# Patient Record
Sex: FEMALE | Race: WHITE | NOT HISPANIC OR LATINO | Employment: OTHER | URBAN - METROPOLITAN AREA
[De-identification: names, ages, dates, MRNs, and addresses within clinical notes are randomized per-mention and may not be internally consistent; named-entity substitution may affect disease eponyms.]

---

## 2017-05-04 ENCOUNTER — OFFICE VISIT (OUTPATIENT)
Dept: FAMILY MEDICINE | Facility: CLINIC | Age: 72
End: 2017-05-04
Payer: COMMERCIAL

## 2017-05-04 VITALS
OXYGEN SATURATION: 98 % | WEIGHT: 161 LBS | HEIGHT: 70 IN | DIASTOLIC BLOOD PRESSURE: 71 MMHG | HEART RATE: 86 BPM | TEMPERATURE: 96.6 F | BODY MASS INDEX: 23.05 KG/M2 | SYSTOLIC BLOOD PRESSURE: 132 MMHG

## 2017-05-04 DIAGNOSIS — E78.5 HYPERLIPIDEMIA LDL GOAL <100: ICD-10-CM

## 2017-05-04 DIAGNOSIS — R07.9 CHEST PAIN, UNSPECIFIED TYPE: ICD-10-CM

## 2017-05-04 DIAGNOSIS — K14.8 TONGUE LESION: ICD-10-CM

## 2017-05-04 DIAGNOSIS — I65.22 STENOSIS OF LEFT CAROTID ARTERY: ICD-10-CM

## 2017-05-04 DIAGNOSIS — F17.218 CIGARETTE NICOTINE DEPENDENCE WITH OTHER NICOTINE-INDUCED DISORDER: ICD-10-CM

## 2017-05-04 DIAGNOSIS — I73.9 PVD (PERIPHERAL VASCULAR DISEASE) (H): Primary | ICD-10-CM

## 2017-05-04 LAB
ERYTHROCYTE [DISTWIDTH] IN BLOOD BY AUTOMATED COUNT: 14.2 % (ref 10–15)
ERYTHROCYTE [SEDIMENTATION RATE] IN BLOOD BY WESTERGREN METHOD: 14 MM/H (ref 0–30)
HCT VFR BLD AUTO: 43 % (ref 35–47)
HGB BLD-MCNC: 14.2 G/DL (ref 11.7–15.7)
MCH RBC QN AUTO: 31.6 PG (ref 26.5–33)
MCHC RBC AUTO-ENTMCNC: 33 G/DL (ref 31.5–36.5)
MCV RBC AUTO: 96 FL (ref 78–100)
PLATELET # BLD AUTO: 278 10E9/L (ref 150–450)
RBC # BLD AUTO: 4.5 10E12/L (ref 3.8–5.2)
WBC # BLD AUTO: 9.6 10E9/L (ref 4–11)

## 2017-05-04 PROCEDURE — 85652 RBC SED RATE AUTOMATED: CPT | Performed by: INTERNAL MEDICINE

## 2017-05-04 PROCEDURE — 99203 OFFICE O/P NEW LOW 30 MIN: CPT | Performed by: INTERNAL MEDICINE

## 2017-05-04 PROCEDURE — 85027 COMPLETE CBC AUTOMATED: CPT | Performed by: INTERNAL MEDICINE

## 2017-05-04 PROCEDURE — 36415 COLL VENOUS BLD VENIPUNCTURE: CPT | Performed by: INTERNAL MEDICINE

## 2017-05-04 NOTE — NURSING NOTE
"Chief Complaint   Patient presents with     New Patient     tongue lesion --has been seen [facility] x2  aches left gland up to ear        Initial /71 (BP Location: Left arm, Patient Position: Chair, Cuff Size: Adult Regular)  Pulse 86  Temp 96.6  F (35.9  C) (Oral)  Ht 5' 10\" (1.778 m)  Wt 161 lb (73 kg)  SpO2 98%  Breastfeeding? No  BMI 23.1 kg/m2 Estimated body mass index is 23.1 kg/(m^2) as calculated from the following:    Height as of this encounter: 5' 10\" (1.778 m).    Weight as of this encounter: 161 lb (73 kg).  Medication Reconciliation: complete  "

## 2017-05-04 NOTE — MR AVS SNAPSHOT
After Visit Summary   5/4/2017    Ramya Hillman    MRN: [id number]           Patient Information     Date Of Birth          1945        Visit Information        Provider Department      5/4/2017 7:30 AM Madan Lizama MD [facility]        Today's Diagnoses     PVD (peripheral vascular disease) (H)    -  1    Hyperlipidemia LDL goal <100        Chest pain, unspecified type        Tongue lesion        Stenosis of left carotid artery        Cigarette nicotine dependence with other nicotine-induced disorder           Follow-ups after your visit        Your next 10 appointments already scheduled     May 05, 2017 10:00 AM CDT   US CAROTID BILATERAL with SHUS1   [facility] ([facility])    [address] [address] [phone]           Please bring a list of your medicines (including vitamins, minerals and over-the-counter drugs). Also, tell your doctor about any allergies you may have. Wear comfortable clothes and leave your valuables at home.  You do not need to do anything special to prepare for your exam.  Please call the Imaging Department at your exam site with any questions.              Future tests that were ordered for you today     Open Future Orders        Priority Expected Expires Ordered    US Carotid Bilateral Routine  5/4/2018 5/4/2017            Who to contact     If you have questions or need follow up information about today's clinic visit or your schedule please contact [facility] directly at [phone].  Normal or non-critical lab and imaging results will be communicated to you by MyChart, letter or phone within 4 business days after the clinic has received the results. If you do not hear from us within 7 days, please contact the clinic through MyChart or phone. If you have a critical or abnormal lab result, we will notify you by phone as soon as possible.  Submit refill requests through [employer]  "or call your pharmacy and they will forward the refill request to us. Please allow 3 business days for your refill to be completed.          Additional Information About Your Visit        MyChart Information     [employer] lets you send messages to your doctor, view your test results, renew your prescriptions, schedule appointments and more. To sign up, go to [email or web address] . Click on \"Log in\" on the left side of the screen, which will take you to the Welcome page. Then click on \"Sign up Now\" on the right side of the page.     You will be asked to enter the access code listed below, as well as some personal information. Please follow the directions to create your username and password.     Your access code is: [email or web address]  Expires: 2017  8:02 AM     Your access code will  in 90 days. If you need help or a new code, please call your [place] clinic or [phone].        Care EveryWhere ID     This is your [facility] EveryWhere ID. This could be used by other organizations to access your [place] medical records  [id number]        Your Vitals Were     Pulse Temperature Height Pulse Oximetry Breastfeeding? BMI (Body Mass Index)    86 96.6  F (35.9  C) (Oral) 5' 10\" (1.778 m) 98% No 23.1 kg/m2       Blood Pressure from Last 3 Encounters:   17 132/71   11/15/16 110/72   11/10/15 118/72    Weight from Last 3 Encounters:   17 161 lb (73 kg)   11/15/16 169 lb (76.7 kg)   11/10/15 168 lb (76.2 kg)              We Performed the Following     CBC with platelets     ESR: Erythrocyte sedimentation rate        Primary Care Provider Office Phone # Fax #    Madan Lizama [phone]       [facility] [address]  [facility] [address]        Thank you!     Thank you for choosing [facility]  for your care. Our goal is always to provide you with excellent care. Hearing back from our patients is one way we can continue to improve our services. Please take a " few minutes to complete the written survey that you may receive in the mail after your visit with us. Thank you!             Your Updated Medication List - Protect others around you: Learn how to safely use, store and throw away your medicines at www.disposemymeds.org.          This list is accurate as of: 5/4/17  2:14 PM.  Always use your most recent med list.                   Brand Name Dispense Instructions for use    aspirin 81 MG tablet     1 tablet    Take by mouth daily       Co Q10 100 MG Caps      Take by mouth daily       Fish Oil 1000 MG Cpdr      Take by mouth daily       Multi-vitamin Tabs tablet      Take 1 tablet by mouth daily       nitroglycerin 0.4 MG sublingual tablet    NITROSTAT    25 tablet    Place 1 tablet (0.4 mg) under the tongue every 5 minutes as needed for chest pain If you are still having symptoms after 3 doses (15 minutes) call 911.       simvastatin 20 MG tablet    ZOCOR    90 tablet    Take 1 tablet (20 mg) by mouth At Bedtime       vitamin D 1000 UNITS capsule      Take 1 capsule by mouth daily

## 2017-05-04 NOTE — PROGRESS NOTES
SUBJECTIVE:                                                    Ramya Hillman is a 72 year old female who presents to clinic today for the following health issues:    New Patient/Transfer of Care  Chief Complaint   Patient presents with     New Patient     tongue lesion --has been seen at [facility] Laryng x2  aches left gland up to ear      Patient with history of hyperlipidemia and peripheral vascular disease presents to the clinic to check on her left tongue lesion and neck pain. She states that for the last 5-6 weeks, she has had throbbing and stinging pain on the left side of her tongue, and an achy pain from her neck to her ear. She reports that it started when she woke up one morning and the left side of her mouth to the roof of her mouth was swollen and extremely sore. She states that she flossed and the pain resolved. Patient then reports that a week later she started getting sharp pain on the left side of her tongue. She saw an ENT specialist and was prescribed Prednisone and medrol dose pack. She finished the course of her medication but the pain did not resolve. Today, she states the pain has greatly improved but she still has throbbing and aching on the left side of her tongue and on the left side of her neck, from her left lymph node to her left ear. She states she has no problem swallowing or eating but hasn't had an appetite and has lost some weight.    Problem list and histories reviewed & adjusted, as indicated.  Additional history: as documented    Current Outpatient Prescriptions   Medication Sig Dispense Refill     simvastatin (ZOCOR) 20 MG tablet Take 1 tablet (20 mg) by mouth At Bedtime 90 tablet 4     aspirin 81 MG tablet Take by mouth daily 1 tablet      Omega-3 Fatty Acids (FISH OIL) 1000 MG CPDR Take by mouth daily       Cholecalciferol (VITAMIN D) 1000 UNITS capsule Take 1 capsule by mouth daily       multivitamin, therapeutic with minerals (MULTI-VITAMIN) TABS Take 1 tablet by mouth  "daily       Coenzyme Q10 (CO Q10) 100 MG CAPS Take by mouth daily       nitroglycerin (NITROSTAT) 0.4 MG SL tablet Place 1 tablet (0.4 mg) under the tongue every 5 minutes as needed for chest pain If you are still having symptoms after 3 doses (15 minutes) call 911. 25 tablet 0     No Known Allergies    ROS:  Constitutional, HEENT, cardiovascular, pulmonary, gi and gu systems are negative, except as otherwise noted.    This document serves as a record of the services and decisions personally performed and made by Madan Lizama MD. It was created on his/her behalf by Enrike Ramirez, a trained medical scribe. The creation of this document is based the provider's statements to the medical scribe.  Scribe Enrike Ramirez 7:40 AM, May 4, 2017    OBJECTIVE:                                                    /71 (BP Location: Left arm, Patient Position: Chair, Cuff Size: Adult Regular)  Pulse 86  Temp 96.6  F (35.9  C) (Oral)  Ht 1.778 m (5' 10\")  Wt 73 kg (161 lb)  SpO2 98%  Breastfeeding? No  BMI 23.1 kg/m2  Body mass index is 23.1 kg/(m^2).    HEENT nose and throat clear, left lateral tongue sensitive but completely normal  Neck was supple without adenopathy or thyromegaly, she has a left carotid bruit  Chest clear to auscultation and percussion  Cardiovascular S1 and S2 are physiologic without murmurs or gallops  Abdomen bowel sounds were normal.  There is no palpable mass or organomegaly  Extremities nontender without any edema  Pulses pedal pulses are as described otherwise his pulses are bilaterally symmetrical throughout without bruits  Skin without significant abnormality    Diagnostic Test Results:  No results found for this or any previous visit (from the past 24 hour(s)).     ASSESSMENT/PLAN:                                                    1. PVD (peripheral vascular disease) (H)    2. Hyperlipidemia LDL goal <100    3. Chest pain, unspecified type    4. Tongue lesion  - CBC with " platelets  - ESR: Erythrocyte sedimentation rate    5. Stenosis of left carotid artery  - US Carotid Bilateral; Future    6. Cigarette nicotine dependence with other nicotine-induced disorder  -Her cigarette cessation is progressing relatively successfully. She has cut back to smoking 3 cigarettes daily.      -Take regularly, ibuprofen 600 MG three times daily with food for a week.     The information in this document, created by the medical scribe for me, accurately reflects the services I personally performed and the decisions made by me. I have reviewed and approved this document for accuracy prior to leaving the patient care area.  Madan Lizama MD  7:49 AM, 05/04/17    Madan Lizama MD  [facility]

## 2017-05-05 ENCOUNTER — HOSPITAL ENCOUNTER (OUTPATIENT)
Dept: ULTRASOUND IMAGING | Facility: CLINIC | Age: 72
Discharge: HOME | End: 2017-05-05
Attending: INTERNAL MEDICINE | Admitting: INTERNAL MEDICINE
Payer: MEDICARE

## 2017-05-05 DIAGNOSIS — I65.22 STENOSIS OF LEFT CAROTID ARTERY: ICD-10-CM

## 2017-05-05 PROCEDURE — 93880 EXTRACRANIAL BILAT STUDY: CPT

## 2017-05-09 ENCOUNTER — OFFICE VISIT (OUTPATIENT)
Dept: FAMILY MEDICINE | Facility: CLINIC | Age: 72
End: 2017-05-09
Payer: COMMERCIAL

## 2017-05-09 VITALS
BODY MASS INDEX: 23.34 KG/M2 | TEMPERATURE: 96.8 F | HEIGHT: 70 IN | DIASTOLIC BLOOD PRESSURE: 91 MMHG | OXYGEN SATURATION: 96 % | HEART RATE: 87 BPM | WEIGHT: 163 LBS | SYSTOLIC BLOOD PRESSURE: 162 MMHG

## 2017-05-09 DIAGNOSIS — K14.6 PAINFUL TONGUE: ICD-10-CM

## 2017-05-09 DIAGNOSIS — Z23 NEED FOR VACCINATION: Primary | ICD-10-CM

## 2017-05-09 DIAGNOSIS — F17.200 NEEDS SMOKING CESSATION EDUCATION: ICD-10-CM

## 2017-05-09 DIAGNOSIS — Z23 NEED FOR PROPHYLACTIC VACCINATION WITH TETANUS-DIPHTHERIA (TD): ICD-10-CM

## 2017-05-09 PROCEDURE — 99213 OFFICE O/P EST LOW 20 MIN: CPT | Performed by: INTERNAL MEDICINE

## 2017-05-09 NOTE — MR AVS SNAPSHOT
After Visit Summary   5/9/2017    Ramya Hillman    MRN: [id number]           Patient Information     Date Of Birth          1945        Visit Information        Provider Department      5/9/2017 2:30 PM Madan Lizama MD [facility]        Today's Diagnoses     Need for vaccination    -  1    Need for prophylactic vaccination with tetanus-diphtheria (TD)        Needs smoking cessation education        Painful tongue           Follow-ups after your visit        Future tests that were ordered for you today     Open Future Orders        Priority Expected Expires Ordered    TD (ADULT, 7+) PRESERVE FREE Routine  5/10/2018 5/10/2017    1st  Administration  [56102] Routine  5/10/2018 5/10/2017            Who to contact     If you have questions or need follow up information about today's clinic visit or your schedule please contact [facility] directly at [phone].  Normal or non-critical lab and imaging results will be communicated to you by Zw[employer], letter or phone within 4 business days after the clinic has received the results. If you do not hear from us within 7 days, please contact the clinic through Zw[employer] or phone. If you have a critical or abnormal lab result, we will notify you by phone as soon as possible.  Submit refill requests through [employer] or call your pharmacy and they will forward the refill request to us. Please allow 3 business days for your refill to be completed.          Additional Information About Your Visit        MyChart Information     [employer] gives you secure access to your electronic health record. If you see a primary care provider, you can also send messages to your care team and make appointments. If you have questions, please call your primary care clinic.  If you do not have a primary care provider, please call [phone] and they will assist you.        Care EveryWhere ID     This is your Care EveryWhere ID. This could be used by other  "organizations to access your [place] medical records  [id number]        Your Vitals Were     Pulse Temperature Height Pulse Oximetry Breastfeeding? BMI (Body Mass Index)    87 96.8  F (36  C) (Tympanic) 5' 10\" (1.778 m) 96% No 23.39 kg/m2       Blood Pressure from Last 3 Encounters:   05/09/17 (!) 162/91   05/04/17 132/71   11/15/16 110/72    Weight from Last 3 Encounters:   05/09/17 163 lb (73.9 kg)   05/04/17 161 lb (73 kg)   11/15/16 169 lb (76.7 kg)               Primary Care Provider Office Phone # Fax #    Madan Lizama [phone]       [facility] [address] [facility] [address]  [facility] [address]        Thank you!     Thank you for choosing [facility]  for your care. Our goal is always to provide you with excellent care. Hearing back from our patients is one way we can continue to improve our services. Please take a few minutes to complete the written survey that you may receive in the mail after your visit with us. Thank you!             Your Updated Medication List - Protect others around you: Learn how to safely use, store and throw away your medicines at www.disposemymeds.org.          This list is accurate as of: 5/9/17 11:59 PM.  Always use your most recent med list.                   Brand Name Dispense Instructions for use    aspirin 81 MG tablet     1 tablet    Take by mouth daily       Co Q10 100 MG Caps      Take by mouth daily       Fish Oil 1000 MG Cpdr      Take by mouth daily       Multi-vitamin Tabs tablet      Take 1 tablet by mouth daily       nitroglycerin 0.4 MG sublingual tablet    NITROSTAT    25 tablet    Place 1 tablet (0.4 mg) under the tongue every 5 minutes as needed for chest pain If you are still having symptoms after 3 doses (15 minutes) call 911.       simvastatin 20 MG tablet    ZOCOR    90 tablet    Take 1 tablet (20 mg) by mouth At Bedtime       vitamin D 1000 UNITS capsule      Take 1 capsule by mouth daily         "

## 2017-05-09 NOTE — NURSING NOTE
Left voicemail for Ramya Crews. She left before we could give her the Tetanus vaccine. Dr. Lizama would like her to stop in when she is able to receive it, but is recommending it is administered in her right arm. She may stop in any time for the Td.

## 2017-05-09 NOTE — PROGRESS NOTES
"  SUBJECTIVE:                                                    Ramya Hillman is a 72 year old female who presents to clinic today for the following health issues:    Chief Complaint   Patient presents with     Follow Up For     Tongue lesion, no improvement. Also thinks that there may be a new spot on the back of the left side of the tongue that is painful, blistering and raised.      Imm/Inj     due for Td vaccine.     Patient requests results of the carotid bilateral US.    Mrs. Hillman presents to the clinic for reevaluation of \"stinging, throbbing\" pain on the left side of her mouth. For initial presentation, evaluation and plan of care see my note from clinic visit on 05/04/2017. She states when she uses a 10x magnifying mirror to look at the posterior oropharynx, under the left side of the tongue, she can see an area of raise \"bubbly\" area. She reports taking ibuprofen TID which has helped.    Problem list and histories reviewed & adjusted, as indicated.  Additional history: as documented    Current Outpatient Prescriptions   Medication Sig Dispense Refill     simvastatin (ZOCOR) 20 MG tablet Take 1 tablet (20 mg) by mouth At Bedtime 90 tablet 4     aspirin 81 MG tablet Take by mouth daily 1 tablet      Omega-3 Fatty Acids (FISH OIL) 1000 MG CPDR Take by mouth daily       Cholecalciferol (VITAMIN D) 1000 UNITS capsule Take 1 capsule by mouth daily       multivitamin, therapeutic with minerals (MULTI-VITAMIN) TABS Take 1 tablet by mouth daily       Coenzyme Q10 (CO Q10) 100 MG CAPS Take by mouth daily       nitroglycerin (NITROSTAT) 0.4 MG SL tablet Place 1 tablet (0.4 mg) under the tongue every 5 minutes as needed for chest pain If you are still having symptoms after 3 doses (15 minutes) call 911. 25 tablet 0     No Known Allergies    Reviewed and updated as needed this visit by clinical staff       Reviewed and updated as needed this visit by Provider       ROS:  Constitutional, HEENT, cardiovascular, " "pulmonary, gi and gu systems are negative, except as otherwise noted.    This document serves as a record of the services and decisions personally performed and made by Madan Lizama MD. It was created on his/her behalf by Lucy Mercado, a trained medical scribe. The creation of this document is based the provider's statements to the medical scribe.  Scribe Lucy Mercado 2:39 PM, May 9, 2017     OBJECTIVE:                                                    BP (!) 162/91 (BP Location: Right arm, Cuff Size: Adult Regular)  Pulse 87  Temp 96.8  F (36  C) (Tympanic)  Ht 1.778 m (5' 10\")  Wt 73.9 kg (163 lb)  SpO2 96%  Breastfeeding? No  BMI 23.39 kg/m2  Body mass index is 23.39 kg/(m^2).  HEENT: Ears normal, tongue showed normal foliate papillae without any ulceration or other significant tissue changes of her posterior lateral tongue which was sensitive to the touch. Nasopharynx was normal.  Neck was supple without thyromegaly her carotids were normal without bruits. Previously enlarged cervical or submandibular lymph node was not palpable.    Carotid US was unremarkable.     ASSESSMENT/PLAN:                                                    1. Need for prophylactic vaccination with tetanus-diphtheria (TD)  Will contact her for a nurse only appointment in the near future.   - TD (ADULT, 7+) PRESERVE FREE    2. Needs smoking cessation education  3. Painful tongue  Patients left-sided tongue paresthesias are thought to be referred. The radiation into her left jaw and neck have receded and recommended continuing with the nonsteroidal anti-inflammatory drugs and evaluating the situation over the next two weeks. If there is no ongoing improvement and follow-up CT scanning will be the next step.  Follow up call in 2  weeks    Madan Lizama MD  [facility]    The information in this document, created by the medical scribe for me, accurately reflects the services I personally performed and the " decisions made by me. I have reviewed and approved this document for accuracy prior to leaving the patient care area.  Madan Lizama MD  2:50 PM, 05/09/17

## 2017-05-09 NOTE — NURSING NOTE
"Chief Complaint   Patient presents with     Follow Up For     Tongue lesion, no improvement. Also thinks that there may be a new spot on the back of the left side of the tongue that is painful, blistering and raised.      Imm/Inj     due for Td vaccine.       Initial BP (!) 162/91 (BP Location: Right arm, Cuff Size: Adult Regular)  Pulse 87  Temp 96.8  F (36  C) (Tympanic)  Ht 5' 10\" (1.778 m)  Wt 163 lb (73.9 kg)  SpO2 96%  Breastfeeding? No  BMI 23.39 kg/m2 Estimated body mass index is 23.39 kg/(m^2) as calculated from the following:    Height as of this encounter: 5' 10\" (1.778 m).    Weight as of this encounter: 163 lb (73.9 kg).  Medication Reconciliation: complete   Sloane Ling MA      "

## 2017-05-10 PROBLEM — K14.6 PAINFUL TONGUE: Status: ACTIVE | Noted: 2017-05-10

## 2017-05-15 ENCOUNTER — MYC MEDICAL ADVICE (OUTPATIENT)
Dept: FAMILY MEDICINE | Facility: CLINIC | Age: 72
End: 2017-05-15

## 2017-05-15 DIAGNOSIS — K14.6 TONGUE PAIN: Primary | ICD-10-CM

## 2017-05-15 NOTE — TELEPHONE ENCOUNTER
PCP: Please see mychart message below regarding ongoing left neck pain that goes between chin and left ear.   She has been taking ibuprofen with not much relief.   She is requesting CT scan this week if possible.   Please advise if CT scan is appropriate.     Lucila Livingston RN

## 2017-05-18 ENCOUNTER — HOSPITAL ENCOUNTER (OUTPATIENT)
Dept: MRI IMAGING | Facility: CLINIC | Age: 72
Discharge: HOME | End: 2017-05-18
Attending: INTERNAL MEDICINE | Admitting: INTERNAL MEDICINE
Payer: MEDICARE

## 2017-05-18 DIAGNOSIS — K14.6 TONGUE PAIN: ICD-10-CM

## 2017-05-18 LAB
CREAT BLD-MCNC: 0.8 MG/DL (ref 0.52–1.04)
GFR SERPL CREATININE-BSD FRML MDRD: 71 ML/MIN/1.7M2

## 2017-05-18 PROCEDURE — A9585 GADOBUTROL INJECTION: HCPCS | Performed by: INTERNAL MEDICINE

## 2017-05-18 PROCEDURE — 70553 MRI BRAIN STEM W/O & W/DYE: CPT

## 2017-05-18 PROCEDURE — 25000128 H RX IP 250 OP 636: Performed by: INTERNAL MEDICINE

## 2017-05-18 PROCEDURE — 82565 ASSAY OF CREATININE: CPT

## 2017-05-18 RX ORDER — GADOBUTROL 604.72 MG/ML
7.5 INJECTION INTRAVENOUS ONCE
Status: COMPLETED | OUTPATIENT
Start: 2017-05-18 | End: 2017-05-18

## 2017-05-18 RX ADMIN — GADOBUTROL 7.5 ML: 604.72 INJECTION INTRAVENOUS at 12:21

## 2017-07-01 ENCOUNTER — HEALTH MAINTENANCE LETTER (OUTPATIENT)
Age: 72
End: 2017-07-01

## 2017-08-18 ENCOUNTER — OFFICE VISIT (OUTPATIENT)
Dept: URGENT CARE | Facility: URGENT CARE | Age: 72
End: 2017-08-18
Payer: COMMERCIAL

## 2017-08-18 VITALS
BODY MASS INDEX: 24.25 KG/M2 | TEMPERATURE: 98.2 F | DIASTOLIC BLOOD PRESSURE: 86 MMHG | HEART RATE: 91 BPM | SYSTOLIC BLOOD PRESSURE: 140 MMHG | WEIGHT: 169 LBS | OXYGEN SATURATION: 99 %

## 2017-08-18 DIAGNOSIS — L98.9 SKIN LESION: Primary | ICD-10-CM

## 2017-08-18 PROCEDURE — 99213 OFFICE O/P EST LOW 20 MIN: CPT | Performed by: FAMILY MEDICINE

## 2017-08-18 NOTE — NURSING NOTE
"Chief Complaint   Patient presents with     Derm Problem     has red area on lt lower leg for past week.Also has a mole behind rt ear.       Initial /86 (BP Location: Left arm, Patient Position: Chair, Cuff Size: Adult Regular)  Pulse 91  Temp 98.2  F (36.8  C) (Oral)  Wt 169 lb (76.7 kg)  SpO2 99%  BMI 24.25 kg/m2 Estimated body mass index is 24.25 kg/(m^2) as calculated from the following:    Height as of 5/9/17: 5' 10\" (1.778 m).    Weight as of this encounter: 169 lb (76.7 kg).  Medication Reconciliation: complete   Lucia BHATTI    "

## 2017-08-18 NOTE — PROGRESS NOTES
SUBJECTIVE:Ramya Hillman is a 72 year old female who presents to the clinic today for a rash.  Onset of skin lesion was week(s) ago.   Rash is still present.   Location of the rash: ear and lower leg.  Associated symptoms include: redness.    Symptoms are mild and moderate and rash seems to be worsening.  Therapies tried to improve the rash: none.  Previous history of a similar rash? Yes: BCC and SK  Recent exposure history: none known    Past Medical History:   Diagnosis Date     Chest pain      Condyloma     had for5 years, 20 years ago,recurrence 2012 biopsy taken     Family history of heart disease      Hyperlipidemia      PVD (peripheral vascular disease) (H)      Tobacco abuse      No Known Allergies  Social History   Substance Use Topics     Smoking status: Current Some Day Smoker     Packs/day: 0.25     Types: Cigarettes     Last attempt to quit: 9/15/2016     Smokeless tobacco: Never Used      Comment: 2-3 cigarettes daily     Alcohol use No       ROS:CONSTITUTIONAL:NEGATIVE for fever, chills, change in weight    EXAM: VITALS: /86 (BP Location: Left arm, Patient Position: Chair, Cuff Size: Adult Regular)  Pulse 91  Temp 98.2  F (36.8  C) (Oral)  Wt 169 lb (76.7 kg)  SpO2 99%  BMI 24.25 kg/m2  General:healthy,alert,no distress    Location: behing rt ear and leftleg     Distribution: localized     Lesion grouping: single     Lesion type: macular and papular     Color: red with no other findingsPERTINENT EXAM: GENERAL APPEARANCE: healthy, alert and no distress      ICD-10-CM    1. Skin lesion L98.9 DERMATOLOGY REFERRAL     Follow-up with primary clinic if not improving

## 2017-08-18 NOTE — MR AVS SNAPSHOT
After Visit Summary   8/18/2017    Ramya Hillman    MRN: [id number]           Patient Information     Date Of Birth          1945        Visit Information        Provider Department      8/18/2017 4:20 PM Raudel Godoy,  [facility]        Today's Diagnoses     Skin lesion    -  1       Follow-ups after your visit        Additional Services     DERMATOLOGY REFERRAL       Your provider has referred you to: FM[facility]: [facility] [phone]   [email or web address]  [facility]: [facility] [phone]   [email or web address]    Please be aware that coverage of these services is subject to the terms and limitations of your health insurance plan.  Call member services at your health plan with any benefit or coverage questions.      Please bring the following with you to your appointment:    (1) Any X-Rays, CTs or MRIs which have been performed.  Contact the facility where they were done to arrange for  prior to your scheduled appointment.    (2) List of current medications  (3) This referral request   (4) Any documents/labs given to you for this referral                  Who to contact     If you have questions or need follow up information about today's clinic visit or your schedule please contact [facility] directly at [phone].  Normal or non-critical lab and imaging results will be communicated to you by MyChart, letter or phone within 4 business days after the clinic has received the results. If you do not hear from us within 7 days, please contact the clinic through MyChart or phone. If you have a critical or abnormal lab result, we will notify you by phone as soon as possible.  Submit refill requests through [email or web address] or call your pharmacy and they will forward the refill request to us. Please allow 3  business days for your refill to be completed.          Additional Information About Your Visit        MyChart Information     [employer] gives you secure access to your electronic health record. If you see a primary care provider, you can also send messages to your care team and make appointments. If you have questions, please call your primary care clinic.  If you do not have a primary care provider, please call [phone] and they will assist you.        Care EveryWhere ID     This is your Care EveryWhere ID. This could be used by other organizations to access your [place] medical records  [id number]        Your Vitals Were     Pulse Temperature Pulse Oximetry BMI (Body Mass Index)          91 98.2  F (36.8  C) (Oral) 99% 24.25 kg/m2         Blood Pressure from Last 3 Encounters:   08/18/17 140/86   05/09/17 (!) 162/91   05/04/17 132/71    Weight from Last 3 Encounters:   08/18/17 169 lb (76.7 kg)   05/09/17 163 lb (73.9 kg)   05/04/17 161 lb (73 kg)              We Performed the Following     DERMATOLOGY REFERRAL        Primary Care Provider Office Phone # Fax #    Madan Lizama [phone] [address] [facility] [address]  [facility] [address]        Equal Access to Services     ASAD [place] : Hadii aad ku hadasho Soomaali, waaxda luqadaha, qaybta kaalmada adeegyada, waxay devonte sosa . So [facility] [phone].    ATENCIÓN: Si habla español, tiene a curran disposición servicios gratuitos de asistencia lingüística. Llame al [phone].    We comply with applicable federal civil rights laws and [place] laws. We do not discriminate on the basis of race, color, national origin, age, disability sex, sexual orientation or gender identity.            Thank you!     Thank you for choosing [facility]  for your care. Our goal is always to provide you with excellent care. Hearing back from our patients is one way we can continue to improve our services.  Please take a few minutes to complete the written survey that you may receive in the mail after your visit with us. Thank you!             Your Updated Medication List - Protect others around you: Learn how to safely use, store and throw away your medicines at www.disposemymeds.org.          This list is accurate as of: 8/18/17  4:40 PM.  Always use your most recent med list.                   Brand Name Dispense Instructions for use Diagnosis    aspirin 81 MG tablet     1 tablet    Take by mouth daily        Co Q10 100 MG Caps      Take by mouth daily        Fish Oil 1000 MG Cpdr      Take by mouth daily        Multi-vitamin Tabs tablet      Take 1 tablet by mouth daily        nitroGLYcerin 0.4 MG sublingual tablet    NITROSTAT    25 tablet    Place 1 tablet (0.4 mg) under the tongue every 5 minutes as needed for chest pain If you are still having symptoms after 3 doses (15 minutes) call 911.    Coronary artery disease involving autologous artery coronary bypass graft with unspecified angina pectoris       simvastatin 20 MG tablet    ZOCOR    90 tablet    Take 1 tablet (20 mg) by mouth At Bedtime    Hypercholesterolemia       vitamin D 1000 UNITS capsule      Take 1 capsule by mouth daily

## 2017-11-02 ENCOUNTER — OFFICE VISIT (OUTPATIENT)
Dept: FAMILY MEDICINE | Facility: CLINIC | Age: 72
End: 2017-11-02
Payer: COMMERCIAL

## 2017-11-02 VITALS
OXYGEN SATURATION: 100 % | WEIGHT: 168.2 LBS | TEMPERATURE: 97.1 F | HEIGHT: 70 IN | BODY MASS INDEX: 24.08 KG/M2 | DIASTOLIC BLOOD PRESSURE: 79 MMHG | SYSTOLIC BLOOD PRESSURE: 148 MMHG | HEART RATE: 76 BPM

## 2017-11-02 DIAGNOSIS — Z12.31 ENCOUNTER FOR SCREENING MAMMOGRAM FOR BREAST CANCER: ICD-10-CM

## 2017-11-02 DIAGNOSIS — K14.6 PAINFUL TONGUE: ICD-10-CM

## 2017-11-02 DIAGNOSIS — R53.83 FATIGUE, UNSPECIFIED TYPE: ICD-10-CM

## 2017-11-02 DIAGNOSIS — E55.9 VITAMIN D DEFICIENCY: ICD-10-CM

## 2017-11-02 DIAGNOSIS — I25.9 CHRONIC ISCHEMIC HEART DISEASE: ICD-10-CM

## 2017-11-02 DIAGNOSIS — Z23 NEED FOR PROPHYLACTIC VACCINATION AND INOCULATION AGAINST INFLUENZA: ICD-10-CM

## 2017-11-02 DIAGNOSIS — E78.5 HYPERLIPIDEMIA LDL GOAL <100: ICD-10-CM

## 2017-11-02 DIAGNOSIS — Z00.00 ROUTINE GENERAL MEDICAL EXAMINATION AT A HEALTH CARE FACILITY: ICD-10-CM

## 2017-11-02 DIAGNOSIS — I73.9 PVD (PERIPHERAL VASCULAR DISEASE) (H): Primary | ICD-10-CM

## 2017-11-02 PROBLEM — I25.750 CORONARY ARTERY DISEASE INVOLVING NATIVE ARTERY OF TRANSPLANTED HEART WITH UNSTABLE ANGINA PECTORIS (H): Status: RESOLVED | Noted: 2017-11-02 | Resolved: 2017-11-02

## 2017-11-02 PROBLEM — I25.750 CORONARY ARTERY DISEASE INVOLVING NATIVE ARTERY OF TRANSPLANTED HEART WITH UNSTABLE ANGINA PECTORIS (H): Status: ACTIVE | Noted: 2017-11-02

## 2017-11-02 LAB
ALBUMIN UR-MCNC: NEGATIVE MG/DL
APPEARANCE UR: CLEAR
BILIRUB UR QL STRIP: NEGATIVE
COLOR UR AUTO: YELLOW
ERYTHROCYTE [DISTWIDTH] IN BLOOD BY AUTOMATED COUNT: 14.6 % (ref 10–15)
GLUCOSE UR STRIP-MCNC: NEGATIVE MG/DL
HCT VFR BLD AUTO: 43 % (ref 35–47)
HGB BLD-MCNC: 14.2 G/DL (ref 11.7–15.7)
HGB UR QL STRIP: NEGATIVE
KETONES UR STRIP-MCNC: ABNORMAL MG/DL
LEUKOCYTE ESTERASE UR QL STRIP: NEGATIVE
MCH RBC QN AUTO: 31.5 PG (ref 26.5–33)
MCHC RBC AUTO-ENTMCNC: 33 G/DL (ref 31.5–36.5)
MCV RBC AUTO: 95 FL (ref 78–100)
NITRATE UR QL: NEGATIVE
PH UR STRIP: 5.5 PH (ref 5–7)
PLATELET # BLD AUTO: 267 10E9/L (ref 150–450)
RBC # BLD AUTO: 4.51 10E12/L (ref 3.8–5.2)
SOURCE: ABNORMAL
SP GR UR STRIP: 1.02 (ref 1–1.03)
UROBILINOGEN UR STRIP-ACNC: 0.2 EU/DL (ref 0.2–1)
WBC # BLD AUTO: 8.5 10E9/L (ref 4–11)

## 2017-11-02 PROCEDURE — 90686 IIV4 VACC NO PRSV 0.5 ML IM: CPT | Performed by: INTERNAL MEDICINE

## 2017-11-02 PROCEDURE — 84443 ASSAY THYROID STIM HORMONE: CPT | Performed by: INTERNAL MEDICINE

## 2017-11-02 PROCEDURE — 36415 COLL VENOUS BLD VENIPUNCTURE: CPT | Performed by: INTERNAL MEDICINE

## 2017-11-02 PROCEDURE — G0439 PPPS, SUBSEQ VISIT: HCPCS | Mod: 25 | Performed by: INTERNAL MEDICINE

## 2017-11-02 PROCEDURE — 82306 VITAMIN D 25 HYDROXY: CPT | Performed by: INTERNAL MEDICINE

## 2017-11-02 PROCEDURE — 90715 TDAP VACCINE 7 YRS/> IM: CPT | Performed by: INTERNAL MEDICINE

## 2017-11-02 PROCEDURE — 80061 LIPID PANEL: CPT | Performed by: INTERNAL MEDICINE

## 2017-11-02 PROCEDURE — 90471 IMMUNIZATION ADMIN: CPT | Performed by: INTERNAL MEDICINE

## 2017-11-02 PROCEDURE — 80053 COMPREHEN METABOLIC PANEL: CPT | Performed by: INTERNAL MEDICINE

## 2017-11-02 PROCEDURE — 81003 URINALYSIS AUTO W/O SCOPE: CPT | Performed by: INTERNAL MEDICINE

## 2017-11-02 PROCEDURE — G0008 ADMIN INFLUENZA VIRUS VAC: HCPCS | Mod: 59 | Performed by: INTERNAL MEDICINE

## 2017-11-02 PROCEDURE — 85027 COMPLETE CBC AUTOMATED: CPT | Performed by: INTERNAL MEDICINE

## 2017-11-02 NOTE — MR AVS SNAPSHOT
After Visit Summary   11/2/2017    Ramya Hillman    MRN: [id number]           Patient Information     Date Of Birth          1945        Visit Information        Provider Department      11/2/2017 3:00 PM Madan Lizama MD [facility]        Today's Diagnoses     PVD (peripheral vascular disease) (H)    -  1    Hyperlipidemia LDL goal <100        Chronic ischemic heart disease        Painful tongue        Fatigue, unspecified type        Vitamin D deficiency        Routine general medical examination at a health care facility        Encounter for screening mammogram for breast cancer        Need for prophylactic vaccination and inoculation against influenza           Follow-ups after your visit        Your next 10 appointments already scheduled     Nov 21, 2017  9:50 AM CST   PHYSICAL with Joanna Montalvo MD   [facility]    [address] [place] [address] [phone]              Who to contact     If you have questions or need follow up information about today's clinic visit or your schedule please contact [facility] directly at [phone].  Normal or non-critical lab and imaging results will be communicated to you by [employer], letter or phone within 4 business days after the clinic has received the results. If you do not hear from us within 7 days, please contact the clinic through Heckylhart or phone. If you have a critical or abnormal lab result, we will notify you by phone as soon as possible.  Submit refill requests through for; to (do) Centers or call your pharmacy and they will forward the refill request to us. Please allow 3 business days for your refill to be completed.          Additional Information About Your Visit        Heckylhart Information     for; to (do) Centers gives you secure access to your electronic health record. If you see a primary care provider, you can also send messages to your care team  "and make appointments. If you have questions, please call your primary care clinic.  If you do not have a primary care provider, please call [phone] and they will assist you.        Care EveryWhere ID     This is your Care EveryWhere ID. This could be used by other organizations to access your [place] medical records  [id number]        Your Vitals Were     Pulse Temperature Height Pulse Oximetry Breastfeeding? BMI (Body Mass Index)    76 97.1  F (36.2  C) (Oral) 5' 10\" (1.778 m) 100% No 24.13 kg/m2       Blood Pressure from Last 3 Encounters:   11/02/17 148/79   08/18/17 140/86   05/09/17 (!) 162/91    Weight from Last 3 Encounters:   11/02/17 168 lb 3.2 oz (76.3 kg)   08/18/17 169 lb (76.7 kg)   05/09/17 163 lb (73.9 kg)              We Performed the Following     CBC with platelets     Comprehensive metabolic panel     FLU VAC, SPLIT VIRUS IM > 3 YO (QUADRIVALENT) [70362]     Lipid panel reflex to direct LDL Fasting     TDAP VACCINE (ADACEL)     TSH with free T4 reflex     UA reflex to Microscopic and Culture     Vaccine Administration, Initial [04088]     Vitamin D Deficiency        Primary Care Provider Office Phone # Fax #    Madan Lizama [phone] [address] [address]        Equal Access to Services     [facility] : Hadii aad ku hadasho Soomaali, waaxda luqadaha, qaybta kaalmada ademoyyada, ayla sosa . So [facility] [phone].    ATENCIÓN: Si habla español, tiene a curran disposición servicios gratuitos de asistencia lingüística. Llame al [phone].    We comply with applicable federal civil rights laws and [place] laws. We do not discriminate on the basis of race, color, national origin, age, disability, sex, sexual orientation, or gender identity.            Thank you!     Thank you for choosing [facility]  for your care. Our goal is always to provide you with excellent care. Hearing back from our patients " is one way we can continue to improve our services. Please take a few minutes to complete the written survey that you may receive in the mail after your visit with us. Thank you!             Your Updated Medication List - Protect others around you: Learn how to safely use, store and throw away your medicines at www.disposemymeds.org.          This list is accurate as of: 11/2/17 11:59 PM.  Always use your most recent med list.                   Brand Name Dispense Instructions for use Diagnosis    aspirin 81 MG tablet     1 tablet    Take by mouth daily        Co Q10 100 MG Caps      Take by mouth daily        Fish Oil 1000 MG Cpdr      Take by mouth daily        Multi-vitamin Tabs tablet      Take 1 tablet by mouth daily        nitroGLYcerin 0.4 MG sublingual tablet    NITROSTAT    25 tablet    Place 1 tablet (0.4 mg) under the tongue every 5 minutes as needed for chest pain If you are still having symptoms after 3 doses (15 minutes) call 911.    Coronary artery disease involving autologous artery coronary bypass graft with unspecified angina pectoris       simvastatin 20 MG tablet    ZOCOR    90 tablet    Take 1 tablet (20 mg) by mouth At Bedtime    Hypercholesterolemia       vitamin D 1000 UNITS capsule      Take 1 capsule by mouth daily

## 2017-11-02 NOTE — NURSING NOTE
Screening Questionnaire for Adult Immunization    Are you sick today?   No   Do you have allergies to medications, food, a vaccine component or latex?   No   Have you ever had a serious reaction after receiving a vaccination?   No   Do you have a long-term health problem with heart disease, lung disease, asthma, kidney disease, metabolic disease (e.g. diabetes), anemia, or other blood disorder?   No   Do you have cancer, leukemia, HIV/AIDS, or any other immune system problem?   No   In the past 3 months, have you taken medications that affect  your immune system, such as prednisone, other steroids, or anticancer drugs; drugs for the treatment of rheumatoid arthritis, Crohn s disease, or psoriasis; or have you had radiation treatments?   No   Have you had a seizure, or a brain or other nervous system problem?   No   During the past year, have you received a transfusion of blood or blood     products, or been given immune (gamma) globulin or antiviral drug?   No   For women: Are you pregnant or is there a chance you could become        pregnant during the next month?   No   Have you received any vaccinations in the past 4 weeks?   No     Immunization questionnaire answers were all negative.        Per orders of Dr. Lizama, injection of Tdap given by Jose E Luong. Patient instructed to remain in clinic for 15 minutes afterwards, and to report any adverse reaction to me immediately.       Screening performed by Jose E Luong on 11/2/2017 at 4:06 PM.

## 2017-11-02 NOTE — NURSING NOTE
"Chief Complaint   Patient presents with     Follow Up For     Tongue lesion continue with sx, mild pain     LAB REQUEST     cholesterol check, fasting       Initial /79 (BP Location: Right arm, Cuff Size: Adult Regular)  Pulse 76  Temp 97.1  F (36.2  C) (Oral)  Ht 5' 10\" (1.778 m)  Wt 168 lb 3.2 oz (76.3 kg)  SpO2 100%  Breastfeeding? No  BMI 24.13 kg/m2 Estimated body mass index is 24.13 kg/(m^2) as calculated from the following:    Height as of this encounter: 5' 10\" (1.778 m).    Weight as of this encounter: 168 lb 3.2 oz (76.3 kg).  Medication Reconciliation: complete       DICKSON Ray      "

## 2017-11-02 NOTE — PROGRESS NOTES
SUBJECTIVE:   CC: Ramya Hillman is an 72 year old woman who presents for preventive health visit.     HPI    Patient with history of tongue pain presents to the clinic for a preventive health visit. She states that the previous pain that she experienced on her tongue from last year persists. She describes the pain as a mild throbbing ache that is constant, never resolves, and radiates back towards the back of her tongue and her neck. She states it feels as if something is stuck on her tongue.   Patient also complains of occasional heartburn approximately once a week. She denies any wet belches, acid taste in her mouth, and dysphagia.  She also denies headaches, sinus issues, allergies, vision changes, neck or back issues, dyspnea, angina, palpitations, bowel changes, hematochezia, urinary difficulties, vaginal atrophy, and other musculoskeletal issues. She sees a dermatologist for any skin changes. Of note, she stopped smoking cigarettes for approximately three weeks in July.    Today's PHQ-2 Score:   PHQ-2 ( 1999 Pfizer) 11/2/2017   Q1: Little interest or pleasure in doing things 0   Q2: Feeling down, depressed or hopeless 0   PHQ-2 Score 0     Abuse: Current or Past(Physical, Sexual or Emotional)- No  Do you feel safe in your environment - Yes    Social History   Substance Use Topics     Smoking status: Current Some Day Smoker     Packs/day: 0.25     Types: Cigarettes     Last attempt to quit: 9/15/2016     Smokeless tobacco: Never Used      Comment: 2-3 cigarettes daily     Alcohol use No     The patient does not drink >3 drinks per day nor >7 drinks per week.    Reviewed orders with patient.  Reviewed health maintenance and updated orders accordingly - Yes  Current Outpatient Prescriptions   Medication Sig Dispense Refill     simvastatin (ZOCOR) 20 MG tablet Take 1 tablet (20 mg) by mouth At Bedtime 90 tablet 4     aspirin 81 MG tablet Take by mouth daily 1 tablet      Omega-3 Fatty Acids (FISH OIL) 1000 MG  CPDR Take by mouth daily       Cholecalciferol (VITAMIN D) 1000 UNITS capsule Take 1 capsule by mouth daily       multivitamin, therapeutic with minerals (MULTI-VITAMIN) TABS Take 1 tablet by mouth daily       Coenzyme Q10 (CO Q10) 100 MG CAPS Take by mouth daily       nitroglycerin (NITROSTAT) 0.4 MG SL tablet Place 1 tablet (0.4 mg) under the tongue every 5 minutes as needed for chest pain If you are still having symptoms after 3 doses (15 minutes) call 911. 25 tablet 0     Allergies   Allergen Reactions     No Known Allergies        Patient over age 50, mutual decision to screen reflected in health maintenance.    Pertinent mammograms are reviewed under the imaging tab.  History of abnormal Pap smear: NO - age 65 - see link Cervical Cytology Screening Guidelines    Reviewed and updated as needed this visit by clinical staffTobacco  Allergies  Meds  Soc Hx        Reviewed and updated as needed this visit by Provider        Review of Systems  C: NEGATIVE for fever, chills, change in weight  I: NEGATIVE for worrisome rashes, moles or lesions  E: NEGATIVE for vision changes or irritation  ENT: NEGATIVE for ear, mouth and throat problems  R: NEGATIVE for significant cough or SOB  B: NEGATIVE for masses, tenderness or discharge  CV: NEGATIVE for chest pain, palpitations or peripheral edema  GI: NEGATIVE for nausea, abdominal pain, heartburn, or change in bowel habits  : NEGATIVE for unusual urinary or vaginal symptoms. No vaginal bleeding.  M: NEGATIVE for significant arthralgias or myalgia  N: NEGATIVE for weakness, dizziness or paresthesias  P: NEGATIVE for changes in mood or affect      This document serves as a record of the services and decisions personally performed and made by Madan Lizama MD. It was created on his/her behalf by Enrike Ramirez, a trained medical scribe. The creation of this document is based the provider's statements to the medical scribe.  Ranulfo Ramirez 3:18 PM, November 2,  "2017    OBJECTIVE:   /79 (BP Location: Right arm, Cuff Size: Adult Regular)  Pulse 76  Temp 97.1  F (36.2  C) (Oral)  Ht 1.778 m (5' 10\")  Wt 76.3 kg (168 lb 3.2 oz)  SpO2 100%  Breastfeeding? No  BMI 24.13 kg/m2  Physical Exam  GENERAL APPEARANCE: healthy, alert and no distress  EYES: Eyes grossly normal to inspection, PERRL and conjunctivae and sclerae normal  HENT: ear canals and TM's normal, nose and mouth without ulcers or lesions, oropharynx clear and oral mucous membranes moist  NECK: no adenopathy, no asymmetry, masses, or scars and thyroid normal to palpation  RESP: lungs clear to auscultation - no rales, rhonchi or wheezes  BREAST: normal without masses, tenderness or nipple discharge and no palpable axillary masses or adenopathy  CV: regular rate and rhythm, normal S1 S2, no S3 or S4, no murmur, click or rub, no peripheral edema and peripheral pulses strong  ABDOMEN: soft, nontender, no hepatosplenomegaly, no masses and bowel sounds normal  MS: no musculoskeletal defects are noted and gait is age appropriate without ataxia  SKIN: no suspicious lesions or rashes  NEURO: Normal strength and tone, sensory exam grossly normal, mentation intact and speech normal  PSYCH: mentation appears normal and affect normal/bright    ASSESSMENT/PLAN:   1. PVD (peripheral vascular disease) (H)  - TDAP VACCINE (ADACEL)    2. Hyperlipidemia LDL goal <100  - Comprehensive metabolic panel  - Lipid panel reflex to direct LDL Fasting    3. Chronic ischemic heart disease    4. Painful tongue  - MR Brain w/o & w Contrast; Future    5. Fatigue, unspecified type  - CBC with platelets  - TSH with free T4 reflex    6. Vitamin D deficiency  - Vitamin D Deficiency    7. Routine general medical examination at a health care facility  - UA reflex to Microscopic and Culture    8. Encounter for screening mammogram for breast cancer  - *MA Screening Digital Bilateral; Future    9. Need for prophylactic vaccination and inoculation " "against influenza  - FLU VAC, SPLIT VIRUS IM > 3 YO (QUADRIVALENT) [07850]  - Vaccine Administration, Initial [87983]    COUNSELING:  Reviewed preventive health counseling, as reflected in patient instructions       Regular exercise       Healthy diet/nutrition       Vision screening       Hearing screening     reports that she has been smoking Cigarettes.  She has been smoking about 0.25 packs per day. She has never used smokeless tobacco.  Tobacco Cessation Action Plan: Information offered: Patient not interested at this time  Estimated body mass index is 24.13 kg/(m^2) as calculated from the following:    Height as of this encounter: 1.778 m (5' 10\").    Weight as of this encounter: 76.3 kg (168 lb 3.2 oz).     Counseling Resources:  ATP IV Guidelines  Pooled Cohorts Equation Calculator  Breast Cancer Risk Calculator  FRAX Risk Assessment  ICSI Preventive Guidelines  Dietary Guidelines for Americans, 2010  [employer]'s MyPlate  ASA Prophylaxis  Lung CA Screening    Madan Lizama MD  [facility]    The information in this document, created by the medical scribe for me, accurately reflects the services I personally performed and the decisions made by me. I have reviewed and approved this document for accuracy prior to leaving the patient care area.  Madan Lizama MD  4:23 PM, 11/02/17    Injectable Influenza Immunization Documentation    1.  Is the person to be vaccinated sick today?   No    2. Does the person to be vaccinated have an allergy to a component   of the vaccine?   No  Egg Allergy Algorithm Link    3. Has the person to be vaccinated ever had a serious reaction   to influenza vaccine in the past?   No    4. Has the person to be vaccinated ever had Guillain-Barré syndrome?   No    Form completed by Patient        Patient refused HD flu shot ok per Dr Lizama to give regular dose    DICKSON Ray  "

## 2017-11-02 NOTE — PROGRESS NOTES
"  SUBJECTIVE:   Ramya Hillman is a 72 year old female who presents to clinic today for the following health issues:      Chief Complaint   Patient presents with     Follow Up For     Tongue lesion continue with sx, mild pain     LAB REQUEST     cholesterol check, fasting         {additional problems for provider to add:876076}    Problem list and histories reviewed & adjusted, as indicated.  Additional history: {NONE - AS DOCUMENTED:145564::\"as documented\"}    {HIST REVIEW/ LINKS 2:078325}    Reviewed and updated as needed this visit by clinical staff       Reviewed and updated as needed this visit by Provider         {PROVIDER CHARTING PREFERENCE:727658}    Injectable Influenza Immunization Documentation    1.  Is the person to be vaccinated sick today?   No    2. Does the person to be vaccinated have an allergy to a component   of the vaccine?   No  Egg Allergy Algorithm Link    3. Has the person to be vaccinated ever had a serious reaction   to influenza vaccine in the past?   No    4. Has the person to be vaccinated ever had Guillain-Barré syndrome?   No    Form completed by Patient        Patient refused HD flu shot ok per Dr Lizama to give regular dose    DICKSON Ray    "

## 2017-11-03 LAB
ALBUMIN SERPL-MCNC: 4.1 G/DL (ref 3.4–5)
ALP SERPL-CCNC: 68 U/L (ref 40–150)
ALT SERPL W P-5'-P-CCNC: 23 U/L (ref 0–50)
ANION GAP SERPL CALCULATED.3IONS-SCNC: 7 MMOL/L (ref 3–14)
AST SERPL W P-5'-P-CCNC: 21 U/L (ref 0–45)
BILIRUB SERPL-MCNC: 0.6 MG/DL (ref 0.2–1.3)
BUN SERPL-MCNC: 21 MG/DL (ref 7–30)
CALCIUM SERPL-MCNC: 9.2 MG/DL (ref 8.5–10.1)
CHLORIDE SERPL-SCNC: 109 MMOL/L (ref 94–109)
CHOLEST SERPL-MCNC: 146 MG/DL
CO2 SERPL-SCNC: 26 MMOL/L (ref 20–32)
CREAT SERPL-MCNC: 0.81 MG/DL (ref 0.52–1.04)
DEPRECATED CALCIDIOL+CALCIFEROL SERPL-MC: 43 UG/L (ref 20–75)
GFR SERPL CREATININE-BSD FRML MDRD: 70 ML/MIN/1.7M2
GLUCOSE SERPL-MCNC: 87 MG/DL (ref 70–99)
HDLC SERPL-MCNC: 58 MG/DL
LDLC SERPL CALC-MCNC: 64 MG/DL
NONHDLC SERPL-MCNC: 88 MG/DL
POTASSIUM SERPL-SCNC: 4.6 MMOL/L (ref 3.4–5.3)
PROT SERPL-MCNC: 7.4 G/DL (ref 6.8–8.8)
SODIUM SERPL-SCNC: 142 MMOL/L (ref 133–144)
TRIGL SERPL-MCNC: 121 MG/DL
TSH SERPL DL<=0.005 MIU/L-ACNC: 1.72 MU/L (ref 0.4–4)

## 2017-11-06 ENCOUNTER — OFFICE VISIT (OUTPATIENT)
Dept: URGENT CARE | Facility: URGENT CARE | Age: 72
End: 2017-11-06
Payer: COMMERCIAL

## 2017-11-06 ENCOUNTER — TELEPHONE (OUTPATIENT)
Dept: FAMILY MEDICINE | Facility: CLINIC | Age: 72
End: 2017-11-06

## 2017-11-06 ENCOUNTER — RADIANT APPOINTMENT (OUTPATIENT)
Dept: GENERAL RADIOLOGY | Facility: CLINIC | Age: 72
End: 2017-11-06
Attending: FAMILY MEDICINE
Payer: COMMERCIAL

## 2017-11-06 VITALS
RESPIRATION RATE: 16 BRPM | DIASTOLIC BLOOD PRESSURE: 86 MMHG | OXYGEN SATURATION: 98 % | WEIGHT: 166.7 LBS | HEART RATE: 82 BPM | BODY MASS INDEX: 23.92 KG/M2 | SYSTOLIC BLOOD PRESSURE: 130 MMHG | TEMPERATURE: 98.2 F

## 2017-11-06 DIAGNOSIS — T14.8XXA BRUISE: ICD-10-CM

## 2017-11-06 DIAGNOSIS — S01.81XA FACIAL LACERATION, INITIAL ENCOUNTER: ICD-10-CM

## 2017-11-06 DIAGNOSIS — S09.93XA FACIAL INJURY, INITIAL ENCOUNTER: ICD-10-CM

## 2017-11-06 DIAGNOSIS — S09.93XA FACIAL INJURY, INITIAL ENCOUNTER: Primary | ICD-10-CM

## 2017-11-06 PROCEDURE — 12011 RPR F/E/E/N/L/M 2.5 CM/<: CPT | Performed by: FAMILY MEDICINE

## 2017-11-06 PROCEDURE — 70140 X-RAY EXAM OF FACIAL BONES: CPT

## 2017-11-06 PROCEDURE — 99213 OFFICE O/P EST LOW 20 MIN: CPT | Mod: 25 | Performed by: FAMILY MEDICINE

## 2017-11-06 NOTE — MR AVS SNAPSHOT
After Visit Summary   11/6/2017    Ramya Hillman    MRN: [id number]           Patient Information     Date Of Birth          1945        Visit Information        Provider Department      11/6/2017 11:20 AM Raudel Godoy DO [facility]        Today's Diagnoses     Facial injury, initial encounter    -  1    Facial laceration, initial encounter        Bruise           Follow-ups after your visit        Your next 10 appointments already scheduled     Nov 07, 2017  7:15 AM CST   MR BRAIN W/O & W CONTRAST with [id number]   [facility] ([facility])    [address] [place] [address] [phone]           Take your medicines as usual, unless your doctor tells you not to. Bring a list of your current medicines to your exam (including vitamins, minerals and over-the-counter drugs).  You will be given intravenous contrast for this exam. To prepare:   The day before your exam, drink extra fluids at least six 8-ounce glasses (unless your doctor tells you to restrict your fluids).   Have a blood test (creatinine test) within 30 days of your exam. Go to your clinic or Diagnostic Imaging Department for this test.  The MRI machine uses a strong magnet. Please wear clothes without metal (snaps, zippers). A sweatsuit works well, or we may give you a hospital gown.  Please remove any body piercings and hair extensions before you arrive. You will also remove watches, jewelry, hairpins, wallets, dentures, partial dental plates and hearing aids. You may wear contact lenses, and you may be able to wear your rings. We have a safe place to keep your personal items, but it is safer to leave them at home.   **IMPORTANT** THE INSTRUCTIONS BELOW ARE ONLY FOR THOSE PATIENTS WHO HAVE BEEN TOLD THEY WILL RECEIVE SEDATION OR GENERAL ANESTHESIA DURING THEIR MRI PROCEDURE:  IF YOU WILL RECEIVE SEDATION (take medicine to help you relax during your exam):    You must get the medicine from your doctor before you arrive. Bring the medicine to the exam. Do not take it at home.   Arrive one hour early. Bring someone who can take you home after the test. Your medicine will make you sleepy. After the exam, you may not drive, take a bus or take a taxi by yourself.   No eating 8 hours before your exam. You may have clear liquids up until 4 hours before your exam. (Clear liquids include water, clear tea, black coffee and fruit juice without pulp.)  IF YOU WILL RECEIVE ANESTHESIA (be asleep for your exam):   Arrive 1 1/2 hours early. Bring someone who can take you home after the test. You may not drive, take a bus or take a taxi by yourself.   No eating 8 hours before your exam. You may have clear liquids up until 4 hours before your exam. (Clear liquids include water, clear tea, black coffee and fruit juice without pulp.)  Please call the Imaging Department at your exam site with any questions.            Nov 21, 2017  9:50 AM CST   PHYSICAL with Joanna Montalvo MD   [facility] ([facility])    [address] [place] [address] [phone]              Who to contact     If you have questions or need follow up information about today's clinic visit or your schedule please contact [facility] directly at [phone].  Normal or non-critical lab and imaging results will be communicated to you by MyChart, letter or phone within 4 business days after the clinic has received the results. If you do not hear from us within 7 days, please contact the clinic through MyChart or phone. If you have a critical or abnormal lab result, we will notify you by phone as soon as possible.  Submit refill requests through [employer] or call your pharmacy and they will forward the refill request to us. Please allow 3 business days for your refill to be completed.          Additional Information About Your  Visit        MyChart Information     [employer] gives you secure access to your electronic health record. If you see a primary care provider, you can also send messages to your care team and make appointments. If you have questions, please call your primary care clinic.  If you do not have a primary care provider, please call [phone] and they will assist you.        Care EveryWhere ID     This is your Care EveryWhere ID. This could be used by other organizations to access your [place] medical records  [id number]        Your Vitals Were     Pulse Temperature Respirations Pulse Oximetry BMI (Body Mass Index)       82 98.2  F (36.8  C) (Oral) 16 98% 23.92 kg/m2        Blood Pressure from Last 3 Encounters:   11/06/17 130/86   11/02/17 148/79   08/18/17 140/86    Weight from Last 3 Encounters:   11/06/17 166 lb 11.2 oz (75.6 kg)   11/02/17 168 lb 3.2 oz (76.3 kg)   08/18/17 169 lb (76.7 kg)              We Performed the Following     REPAIR SUPERFICIAL, WOUND FACE/EAR <2.5 CM        Primary Care Provider Office Phone # Fax #    Madan Lizama [phone] [address] [address]        Equal Access to Services     ASAD [place]PLE[place] : Hadii aad ku hadasho Soomaali, waaxda luqadaha, qaybta kaalmada adeegyada, waxay idiin hayaan rob khshayy sosa . So [facility] [phone].    ATENCIÓN: Si habla español, tiene a curran disposición servicios gratuitos de asistencia lingüística. Llame al [phone].    We comply with applicable federal civil rights laws and [place] laws. We do not discriminate on the basis of race, color, national origin, age, disability, sex, sexual orientation, or gender identity.            Thank you!     Thank you for choosing [facility]  for your care. Our goal is always to provide you with excellent care. Hearing back from our patients is one way we can continue to improve our services. Please take a few minutes to complete the  written survey that you may receive in the mail after your visit with us. Thank you!             Your Updated Medication List - Protect others around you: Learn how to safely use, store and throw away your medicines at www.disposemymeds.org.          This list is accurate as of: 11/6/17 12:53 PM.  Always use your most recent med list.                   Brand Name Dispense Instructions for use Diagnosis    aspirin 81 MG tablet     1 tablet    Take by mouth daily        Co Q10 100 MG Caps      Take by mouth daily        Fish Oil 1000 MG Cpdr      Take by mouth daily        Multi-vitamin Tabs tablet      Take 1 tablet by mouth daily        nitroGLYcerin 0.4 MG sublingual tablet    NITROSTAT    25 tablet    Place 1 tablet (0.4 mg) under the tongue every 5 minutes as needed for chest pain If you are still having symptoms after 3 doses (15 minutes) call 911.    Coronary artery disease involving autologous artery coronary bypass graft with unspecified angina pectoris       simvastatin 20 MG tablet    ZOCOR    90 tablet    Take 1 tablet (20 mg) by mouth At Bedtime    Hypercholesterolemia       vitamin D 1000 UNITS capsule      Take 1 capsule by mouth daily

## 2017-11-06 NOTE — PROGRESS NOTES
SUBJECTIVE: @[email or web address] who presents to the clinic with a laceration on the bridge of nose and head injury sustained last pm ago.    This is a accidental injury.    Mechanism of injury: blunt trauma (contusion).  Associated symptoms: Denies numbness, weakness, or loss of function  Last tetanus booster within 10 years: yes    Past Medical History:   Diagnosis Date     Chest pain      Condyloma     had for5 years, 20 years ago,recurrence 2012 biopsy taken     Family history of heart disease      Hyperlipidemia      PVD (peripheral vascular disease) (H)      Tobacco abuse      Allergies   Allergen Reactions     No Known Allergies      Social History   Substance Use Topics     Smoking status: Current Some Day Smoker     Packs/day: 0.25     Types: Cigarettes     Last attempt to quit: 9/15/2016     Smokeless tobacco: Never Used      Comment: 2-3 cigarettes daily     Alcohol use No       ROS:  Neuro: good distal sensation  Motor: normal rom and strenght  Hem: capillary refill < 2 sec    EXAM: The patient appears today in alert,no apparent distress distressVITALS:   /86  Pulse 82  Temp 98.2  F (36.8  C) (Oral)  Resp 16  Wt 166 lb 11.2 oz (75.6 kg)  SpO2 98%  BMI 23.92 kg/m2   PERRLA, eomi cn 2-12 groslly intact  Size of laceration: 1 centimeters  Characteristics of the laceration: clean and straight  Tendon function intact: yes  Sensation to light touch intact: yes  Pulses/Capillary refill intact: yes      ICD-10-CM    1. Facial injury, initial encounter S09.93XA XR Facial Bones 1/2 Views   2. Facial laceration, initial encounter S01.81XA REPAIR SUPERFICIAL, WOUND FACE/EAR <2.5 CM   3. Bruise T14.8XXA      Procedure Note:Prepped and draped in the usual sterile fashion  Wound cleaned with sterile waterLaceration was closed using dermabond  After care instructions:Keep wound clean   Signs of infection discussed today

## 2017-11-06 NOTE — TELEPHONE ENCOUNTER
Please abstract the following data from this visit with this patient into the appropriate field in Epic:    Colonoscopy done on this date: Spring 2013 (approximately), by this group: [facility], results were to return in 5 years.

## 2017-11-06 NOTE — NURSING NOTE
"Chief Complaint   Patient presents with     Urgent Care     pt states rolling over the bed to the ground hit the corner of the night stand on the brindge of the nose x last night        Initial /86  Pulse 82  Temp 98.2  F (36.8  C) (Oral)  Resp 16  Wt 166 lb 11.2 oz (75.6 kg)  SpO2 98%  BMI 23.92 kg/m2 Estimated body mass index is 23.92 kg/(m^2) as calculated from the following:    Height as of 11/2/17: 5' 10\" (1.778 m).    Weight as of this encounter: 166 lb 11.2 oz (75.6 kg).  Medication Reconciliation: complete    "

## 2017-11-07 ENCOUNTER — HOSPITAL ENCOUNTER (OUTPATIENT)
Dept: MRI IMAGING | Facility: CLINIC | Age: 72
Discharge: HOME | End: 2017-11-07
Attending: INTERNAL MEDICINE | Admitting: INTERNAL MEDICINE
Payer: MEDICARE

## 2017-11-07 DIAGNOSIS — K14.6 PAINFUL TONGUE: ICD-10-CM

## 2017-11-07 PROCEDURE — 25000128 H RX IP 250 OP 636: Performed by: INTERNAL MEDICINE

## 2017-11-07 PROCEDURE — A9585 GADOBUTROL INJECTION: HCPCS | Performed by: INTERNAL MEDICINE

## 2017-11-07 PROCEDURE — 70553 MRI BRAIN STEM W/O & W/DYE: CPT

## 2017-11-07 RX ORDER — GADOBUTROL 604.72 MG/ML
7 INJECTION INTRAVENOUS ONCE
Status: COMPLETED | OUTPATIENT
Start: 2017-11-07 | End: 2017-11-07

## 2017-11-07 RX ADMIN — GADOBUTROL 7 ML: 604.72 INJECTION INTRAVENOUS at 08:15

## 2017-11-13 ENCOUNTER — MYC MEDICAL ADVICE (OUTPATIENT)
Dept: FAMILY MEDICINE | Facility: CLINIC | Age: 72
End: 2017-11-13

## 2017-11-15 NOTE — TELEPHONE ENCOUNTER
Anil will give the patient a call to get scheduled next week.    Justin Whitlock, [facility]

## 2017-11-20 ENCOUNTER — OFFICE VISIT (OUTPATIENT)
Dept: FAMILY MEDICINE | Facility: CLINIC | Age: 72
End: 2017-11-20
Payer: COMMERCIAL

## 2017-11-20 VITALS
SYSTOLIC BLOOD PRESSURE: 139 MMHG | HEART RATE: 83 BPM | OXYGEN SATURATION: 99 % | BODY MASS INDEX: 23.62 KG/M2 | HEIGHT: 70 IN | DIASTOLIC BLOOD PRESSURE: 69 MMHG | WEIGHT: 165 LBS | TEMPERATURE: 98.1 F

## 2017-11-20 DIAGNOSIS — I73.9 PVD (PERIPHERAL VASCULAR DISEASE) (H): Primary | ICD-10-CM

## 2017-11-20 DIAGNOSIS — E78.00 HYPERCHOLESTEROLEMIA: ICD-10-CM

## 2017-11-20 DIAGNOSIS — R07.9 CHEST PAIN, UNSPECIFIED TYPE: ICD-10-CM

## 2017-11-20 DIAGNOSIS — E78.5 HYPERLIPIDEMIA LDL GOAL <100: ICD-10-CM

## 2017-11-20 DIAGNOSIS — K14.6 PAINFUL TONGUE: ICD-10-CM

## 2017-11-20 PROCEDURE — 99213 OFFICE O/P EST LOW 20 MIN: CPT | Performed by: INTERNAL MEDICINE

## 2017-11-20 RX ORDER — SIMVASTATIN 20 MG
20 TABLET ORAL AT BEDTIME
Qty: 90 TABLET | Refills: 4 | Status: SHIPPED | OUTPATIENT
Start: 2017-11-20 | End: 2018-12-14

## 2017-11-20 NOTE — PROGRESS NOTES
SUBJECTIVE:   Ramya Hillman is a 72 year old female who presents to clinic today for the following health issues:    Patient presents to the clinic to follow up on her lab results and her MRI. She also wants to discuss Zoster sine herpete. She states that she is still having pain on the side of her tongue that radiates to the  back of her tongue. She states she can also feel the sensation in her jaw. The pain and tingling has improved greatly since symptoms began. She denies having a rash.      Problem list and histories reviewed & adjusted, as indicated.  Additional history: as documented    Current Outpatient Prescriptions   Medication Sig Dispense Refill     simvastatin (ZOCOR) 20 MG tablet Take 1 tablet (20 mg) by mouth At Bedtime 90 tablet 4     aspirin 81 MG tablet Take by mouth daily 1 tablet      Omega-3 Fatty Acids (FISH OIL) 1000 MG CPDR Take by mouth daily       Cholecalciferol (VITAMIN D) 1000 UNITS capsule Take 1 capsule by mouth daily       multivitamin, therapeutic with minerals (MULTI-VITAMIN) TABS Take 1 tablet by mouth daily       Coenzyme Q10 (CO Q10) 100 MG CAPS Take by mouth daily       nitroglycerin (NITROSTAT) 0.4 MG SL tablet Place 1 tablet (0.4 mg) under the tongue every 5 minutes as needed for chest pain If you are still having symptoms after 3 doses (15 minutes) call 911. (Patient not taking: Reported on 11/20/2017) 25 tablet 0     Allergies   Allergen Reactions     No Known Allergies        Reviewed and updated as needed this visit by clinical staff  Tobacco  Allergies  Meds  Problems  Med Hx  Surg Hx  Fam Hx  Soc Hx        Reviewed and updated as needed this visit by Provider         ROS:  Constitutional, HEENT, cardiovascular, pulmonary, gi and gu systems are negative, except as otherwise noted.    This document serves as a record of the services and decisions personally performed and made by Madan Lizama MD. It was created on his/her behalf by Enrike Ramirez, a trained  "medical scribe. The creation of this document is based the provider's statements to the medical scribe.  Ranulfo Ramirez 2:18 PM, November 20, 2017    OBJECTIVE:   /69  Pulse 83  Temp 98.1  F (36.7  C) (Oral)  Ht 1.778 m (5' 10\")  Wt 74.8 kg (165 lb)  SpO2 99%  Breastfeeding? No  BMI 23.68 kg/m2  Body mass index is 23.68 kg/(m^2).    10 minutes spent with patient, over 50% time counseling, coordinating care and explaining about nature of the patient's conditions.    Diagnostic Test Results:  none     ASSESSMENT/PLAN:     1. Hypercholesterolemia  - simvastatin (ZOCOR) 20 MG tablet; Take 1 tablet (20 mg) by mouth At Bedtime  Dispense: 90 tablet; Refill: 4    2. Painful tongue  -Reviewed the reports all of which were unremarkable and as a diagnosis of exclusion couldn't rule out an ideology of shingles. Since symptoms are improving we will not treat at this time instead, we will be following closely and recommended a repeat shingles shot when it is available.    Madan Lizama MD  [facility]    The information in this document, created by the medical scribe for me, accurately reflects the services I personally performed and the decisions made by me. I have reviewed and approved this document for accuracy prior to leaving the patient care area.  Madan Lizama MD  2:38 PM, 11/20/17    "

## 2017-11-20 NOTE — NURSING NOTE
"Chief Complaint   Patient presents with     Consult       Initial /69  Pulse 83  Temp 98.1  F (36.7  C) (Oral)  Ht 5' 10\" (1.778 m)  Wt 165 lb (74.8 kg)  SpO2 99%  Breastfeeding? No  BMI 23.68 kg/m2 Estimated body mass index is 23.68 kg/(m^2) as calculated from the following:    Height as of this encounter: 5' 10\" (1.778 m).    Weight as of this encounter: 165 lb (74.8 kg).  Medication Reconciliation: complete   Rosemary Hargrove CMA      "

## 2017-11-20 NOTE — MR AVS SNAPSHOT
After Visit Summary   11/20/2017    Ramya Hillman    MRN: [id number]           Patient Information     Date Of Birth          1945        Visit Information        Provider Department      11/20/2017 2:00 PM Madan Lizama MD [facility]        Today's Diagnoses     PVD (peripheral vascular disease) (H)    -  1    Hypercholesterolemia        Chest pain, unspecified type        Hyperlipidemia LDL goal <100        Painful tongue           Follow-ups after your visit        Your next 10 appointments already scheduled     Nov 21, 2017  9:50 AM CST   PHYSICAL with Joanna Montalvo MD   [facility] ([facility]    [address] [address] [phone]              Who to contact     If you have questions or need follow up information about today's clinic visit or your schedule please contact [facility] directly at [phone].  Normal or non-critical lab and imaging results will be communicated to you by MyChart, letter or phone within 4 business days after the clinic has received the results. If you do not hear from us within 7 days, please contact the clinic through [employer] or phone. If you have a critical or abnormal lab result, we will notify you by phone as soon as possible.  Submit refill requests through [employer] or call your pharmacy and they will forward the refill request to us. Please allow 3 business days for your refill to be completed.          Additional Information About Your Visit        MyChart Information     [employer] gives you secure access to your electronic health record. If you see a primary care provider, you can also send messages to your care team and make appointments. If you have questions, please call your primary care clinic.  If you do not have a primary care provider, please call [phone] and they will assist you.        Care EveryWhere ID     This is your Care  "EveryWhere ID. This could be used by other organizations to access your [place] medical records  [id number]        Your Vitals Were     Pulse Temperature Height Pulse Oximetry Breastfeeding? BMI (Body Mass Index)    83 98.1  F (36.7  C) (Oral) 5' 10\" (1.778 m) 99% No 23.68 kg/m2       Blood Pressure from Last 3 Encounters:   11/20/17 139/69   11/06/17 130/86   11/02/17 148/79    Weight from Last 3 Encounters:   11/20/17 165 lb (74.8 kg)   11/06/17 166 lb 11.2 oz (75.6 kg)   11/02/17 168 lb 3.2 oz (76.3 kg)              Today, you had the following     No orders found for display         Where to get your medicines      These medications were sent to [facility] [address] [place] [address] [address] [facility]  [address] [facility] [address]     Phone:  [phone]     simvastatin 20 MG tablet          Primary Care Provider Office Phone # Fax #    Madan Lizama [phone] [address]  [facility] [address]        Equal Access to Services     SOLE [place] : Hadii abhijeet maliko Soraquelali, waaxda luqadaha, qaybta kaalmada adeegyada, ayla lindsay. So [facility] [phone].    ATENCIÓN: Si habla español, tiene a curran disposición servicios gratuitos de asistencia lingüística. Adonayame al [phone].    We comply with applicable federal civil rights laws and [place] laws. We do not discriminate on the basis of race, color, national origin, age, disability, sex, sexual orientation, or gender identity.            Thank you!     Thank you for choosing [facility]  for your care. Our goal is always to provide you with excellent care. Hearing back from our patients is one way we can continue to improve our services. Please take a few minutes to complete the written survey that you may receive in the mail after your visit with us. Thank you!             Your Updated Medication List - Protect others around you: Learn how to safely use, " store and throw away your medicines at www.disposemymeds.org.          This list is accurate as of: 11/20/17 11:59 PM.  Always use your most recent med list.                   Brand Name Dispense Instructions for use Diagnosis    aspirin 81 MG tablet     1 tablet    Take by mouth daily        Co Q10 100 MG Caps      Take by mouth daily        Fish Oil 1000 MG Cpdr      Take by mouth daily        Multi-vitamin Tabs tablet      Take 1 tablet by mouth daily        nitroGLYcerin 0.4 MG sublingual tablet    NITROSTAT    25 tablet    Place 1 tablet (0.4 mg) under the tongue every 5 minutes as needed for chest pain If you are still having symptoms after 3 doses (15 minutes) call 911.    Coronary artery disease involving autologous artery coronary bypass graft with unspecified angina pectoris       simvastatin 20 MG tablet    ZOCOR    90 tablet    Take 1 tablet (20 mg) by mouth At Bedtime    Hypercholesterolemia       vitamin D 1000 UNITS capsule      Take 1 capsule by mouth daily

## 2017-11-21 ENCOUNTER — TRANSFERRED RECORDS (OUTPATIENT)
Dept: HEALTH INFORMATION MANAGEMENT | Facility: CLINIC | Age: 72
End: 2017-11-21

## 2017-11-21 ENCOUNTER — OFFICE VISIT (OUTPATIENT)
Dept: OBGYN | Facility: CLINIC | Age: 72
End: 2017-11-21
Payer: COMMERCIAL

## 2017-11-21 VITALS
HEIGHT: 70 IN | BODY MASS INDEX: 23.48 KG/M2 | WEIGHT: 164 LBS | SYSTOLIC BLOOD PRESSURE: 134 MMHG | HEART RATE: 62 BPM | DIASTOLIC BLOOD PRESSURE: 76 MMHG

## 2017-11-21 DIAGNOSIS — Z01.419 ENCOUNTER FOR GYNECOLOGICAL EXAMINATION WITHOUT ABNORMAL FINDING: Primary | ICD-10-CM

## 2017-11-21 DIAGNOSIS — N90.89 VULVAR LESION: ICD-10-CM

## 2017-11-21 DIAGNOSIS — Z12.39 BREAST CANCER SCREENING: ICD-10-CM

## 2017-11-21 PROCEDURE — 99397 PER PM REEVAL EST PAT 65+ YR: CPT | Mod: 25 | Performed by: OBSTETRICS & GYNECOLOGY

## 2017-11-21 PROCEDURE — 17110 DESTRUCTION B9 LES UP TO 14: CPT | Performed by: OBSTETRICS & GYNECOLOGY

## 2017-11-21 ASSESSMENT — ANXIETY QUESTIONNAIRES
IF YOU CHECKED OFF ANY PROBLEMS ON THIS QUESTIONNAIRE, HOW DIFFICULT HAVE THESE PROBLEMS MADE IT FOR YOU TO DO YOUR WORK, TAKE CARE OF THINGS AT HOME, OR GET ALONG WITH OTHER PEOPLE: NOT DIFFICULT AT ALL
3. WORRYING TOO MUCH ABOUT DIFFERENT THINGS: NOT AT ALL
7. FEELING AFRAID AS IF SOMETHING AWFUL MIGHT HAPPEN: NOT AT ALL
6. BECOMING EASILY ANNOYED OR IRRITABLE: NOT AT ALL
2. NOT BEING ABLE TO STOP OR CONTROL WORRYING: NOT AT ALL
1. FEELING NERVOUS, ANXIOUS, OR ON EDGE: NOT AT ALL
5. BEING SO RESTLESS THAT IT IS HARD TO SIT STILL: NOT AT ALL
GAD7 TOTAL SCORE: 0

## 2017-11-21 ASSESSMENT — PATIENT HEALTH QUESTIONNAIRE - PHQ9
5. POOR APPETITE OR OVEREATING: NOT AT ALL
SUM OF ALL RESPONSES TO PHQ QUESTIONS 1-9: 0

## 2017-11-21 NOTE — MR AVS SNAPSHOT
After Visit Summary   11/21/2017    Ramya Hillman    MRN: [id number]           Patient Information     Date Of Birth          1945        Visit Information        Provider Department      11/21/2017 9:50 AM Joanna Montalvo MD [facility]        Today's Diagnoses     Encounter for gynecological examination without abnormal finding    -  1    Breast cancer screening        Vulvar lesion           Follow-ups after your visit        Additional Services     RADIOLOGY REFERRAL ([facility])       You have been referred to [facility] for Mammogram (Screening) Imaging.  They are located across the sharma from the [facility] (same building).  [address] [place] [address], [address]  Phone: [phone].                  Who to contact     If you have questions or need follow up information about today's clinic visit or your schedule please contact [facility] directly at [phone].  Normal or non-critical lab and imaging results will be communicated to you by MyChart, letter or phone within 4 business days after the clinic has received the results. If you do not hear from us within 7 days, please contact the clinic through [employer] or phone. If you have a critical or abnormal lab result, we will notify you by phone as soon as possible.  Submit refill requests through [employer] or call your pharmacy and they will forward the refill request to us. Please allow 3 business days for your refill to be completed.          Additional Information About Your Visit        MyChart Information     [employer] gives you secure access to your electronic health record. If you see a primary care provider, you can also send messages to your care team and make appointments. If you have questions, please call your primary care clinic.  If you do not have a primary care provider, please call [phone] and they will assist you.        Care EveryWhere ID     This is your  "Care EveryWhere ID. This could be used by other organizations to access your [place] medical records  [id number]        Your Vitals Were     Pulse Height BMI (Body Mass Index)             62 5' 10\" (1.778 m) 23.53 kg/m2          Blood Pressure from Last 3 Encounters:   11/21/17 134/76   11/20/17 139/69   11/06/17 130/86    Weight from Last 3 Encounters:   11/21/17 164 lb (74.4 kg)   11/20/17 165 lb (74.8 kg)   11/06/17 166 lb 11.2 oz (75.6 kg)              We Performed the Following     DESTRUCT BENIGN LESION, UP TO 14     RADIOLOGY REFERRAL ([facility])        Primary Care Provider Office Phone # Fax #    Madan Lizama [phone] [address] [facility] [address]  [facility] [address]        Equal Access to Services     [facility]: Hadii aad ku hadasho Soryan, waaxda luqadaha, qaybta kaalmada adeegyada, waxay shaquillein haytang sosa . So [facility] [phone].    ATENCIÓN: Si habla español, tiene a curran disposición servicios gratuitos de asistencia lingüística. Adonayame al [phone].    We comply with applicable federal civil rights laws and [place] laws. We do not discriminate on the basis of race, color, national origin, age, disability, sex, sexual orientation, or gender identity.            Thank you!     Thank you for choosing [facility]  for your care. Our goal is always to provide you with excellent care. Hearing back from our patients is one way we can continue to improve our services. Please take a few minutes to complete the written survey that you may receive in the mail after your visit with us. Thank you!             Your Updated Medication List - Protect others around you: Learn how to safely use, store and throw away your medicines at www.disposemymeds.org.          This list is accurate as of: 11/21/17 10:12 AM.  Always use your most recent med list.                   Brand Name Dispense Instructions for use Diagnosis    aspirin 81 MG tablet     " 1 tablet    Take by mouth daily        Co Q10 100 MG Caps      Take by mouth daily        Fish Oil 1000 MG Cpdr      Take by mouth daily        Multi-vitamin Tabs tablet      Take 1 tablet by mouth daily        simvastatin 20 MG tablet    ZOCOR    90 tablet    Take 1 tablet (20 mg) by mouth At Bedtime    Hypercholesterolemia       vitamin D 1000 UNITS capsule      Take 1 capsule by mouth daily

## 2017-11-21 NOTE — PROGRESS NOTES
Ramya is a 72 year old  female who presents for annual exam.     Besides routine health maintenance,  she would like you to look at new skin lesion on vagina.    Do you have a Health Care Directive?: No: Advance care planning was reviewed with patient; patient declined at this time.      Fall risk:   Fallen 2 or more times in the past year?: No  Any fall with injury in the past year?: No      HPI:  The patient's PCP is Madan Lizama MD.    Patient had history of oral shingles, getting better    Notes new vulvar lesion, no bleeding, spotting, itching or ulcers  Bladder ok, no dysuria or freq.   Had labs and med refills with internist    GYNECOLOGIC HISTORY:No LMP recorded. Patient is postmenopausal..   reports that she has been smoking Cigarettes.  She has never used smokeless tobacco.      Patient is not sexually active.  STD testing offered?  Declined  Last PHQ-9 score on record=   PHQ-9 SCORE 2017   Total Score 0     Last GAD7 score on record=   NIRAV-7 SCORE 11/3/2015 2017   Total Score 0 0     Alcohol Score = 1    HEALTH MAINTENANCE:  Cholesterol: 17   Total= 146, Triglycerides=121, HDL=58, LDL=64, FBS=87, TSH=1.72  Last Mammo: 11/15/16, Result: normal, Next Mammo: today   Pap: NA, over 65  DEXA:  09/15/11 wnl  Colonoscopy:  , Result:  abnormal, Next Colonoscopy: 1 year.    Health maintenance updated:  yes    HISTORY:  Obstetric History       T0      L0     SAB0   TAB0   Ectopic0   Multiple0   Live Births0         Patient Active Problem List   Diagnosis     PVD (peripheral vascular disease) (H)     Chest pain     Hyperlipidemia LDL goal <100     Painful tongue     Chronic ischemic heart disease     Past Surgical History:   Procedure Laterality Date     NO HISTORY OF SURGERY        Social History   Substance Use Topics     Smoking status: Current Some Day Smoker     Types: Cigarettes     Last attempt to quit: 9/15/2016     Smokeless tobacco: Never Used      Comment:   "2-3 cigs per day off and on     Alcohol use 0.0 oz/week     0 Standard drinks or equivalent per week      Problem (# of Occurrences) Relation (Name,Age of Onset)    Breast Cancer (2) Sister (80):  at 82 , Niece    CEREBROVASCULAR DISEASE (1) Mother    Coronary Artery Disease (7) Mother, Father (81): MI, Brother, Sister (52), Brother (62), Sister, Sister    Hyperlipidemia (7) Mother, Father, Brother, Sister, Brother, Sister, Sister    OSTEOPOROSIS (1) Mother            Current Outpatient Prescriptions   Medication Sig     simvastatin (ZOCOR) 20 MG tablet Take 1 tablet (20 mg) by mouth At Bedtime     aspirin 81 MG tablet Take by mouth daily     Omega-3 Fatty Acids (FISH OIL) 1000 MG CPDR Take by mouth daily     Cholecalciferol (VITAMIN D) 1000 UNITS capsule Take 1 capsule by mouth daily     multivitamin, therapeutic with minerals (MULTI-VITAMIN) TABS Take 1 tablet by mouth daily     Coenzyme Q10 (CO Q10) 100 MG CAPS Take by mouth daily     No current facility-administered medications for this visit.        Allergies   Allergen Reactions     No Known Allergies        Past medical, surgical, social and family history were reviewed and updated in EPIC.    ROS:   12 point review of systems negative other than symptoms noted below.  Skin: New Skin Lesions    EXAM:  /76  Pulse 62  Ht 5' 10\" (1.778 m)  Wt 164 lb (74.4 kg)  BMI 23.53 kg/m2   BMI: Body mass index is 23.53 kg/(m^2).    EXAM:  Constitutional: Appearance: Well nourished, well developed alert, in no acute distress  Neck:  Lymph Nodes:  No lymphadenopathy present    Thyroid:  Gland size normal, nontender, no nodules or masses present  on palpation  Chest:  Respiratory Effort:  Breathing unlabored  Cardiovascular:Heart    Auscultation:  Regular rate, normal rhythm, no murmurs present  Breasts: Inspection of Breasts:  No lymphadenopathy present., Palpation of Breasts and Axillae:  No masses present on palpation, no breast tenderness., Axillary Lymph " Nodes:  No lymphadenopathy present. and No nodularity, asymmetry or nipple discharge bilaterally.  Gastrointestinal:  Abdominal Examination:  Abdomen nontender to palpation, tone normal without     rigidity or guarding, no masses present, umbilicus without lesions    Liver and speen:  No hepatomegaly present, liver nontender to palpation    Hernias:  No hernias present  Lymphatic: Lymph Nodes:  No other lymphadenopathy present  Skin:  General Inspection:  No rashes present, no lesions present, no areas of  discoloration.    Genitalia and Groin:  No rashes present, no lesions present, no areas of  discoloration, no masses present  Neurologic/Psychiatric:    Mental Status:  Oriented X3     Pelvic Exam:  External Genitalia:     Normal appearance for age, no discharge present, no tenderness present, 5mm condyloma patch on right labia majora, no other warts seen, color normal  Vagina:     Normal vaginal vault without central or paravaginal defects, no discharge present, no inflammatory lesions present, no masses present  Bladder:     Nontender to palpation  Urethra:   Urethral Body:  Urethra palpation normal, urethra structural support normal   Urethral Meatus:  No erythema or lesions present  Cervix:     Appearance healthy, no lesions present, nontender to palpation, no bleeding present  Uterus:     Uterus: firm, normal sized and nontender, anteverted in position.   Adnexa:     No adnexal tenderness present, no adnexal masses present  Perineum:     Perineum within normal limits, no evidence of trauma, no rashes or skin lesions present  Anus:     Anus within normal limits, no hemorrhoids present  Inguinal Lymph Nodes:     No lymphadenopathy present  Pubic Hair:     Normal pubic hair distribution for age  Genitalia and Groin:     No rashes present, no lesions present, no areas of discoloration, no masses present      COUNSELING:   Reviewed preventive health counseling, as reflected in patient instructions       hpv of  vulva    BMI:  Body mass index is 23.53 kg/(m^2).     reports that she has been smoking Cigarettes.  She has never used smokeless tobacco.  Tobacco Cessation Action Plan: Information offered: Patient not interested at this time    ASSESSMENT:  72 year old female with satisfactory annual exam.    ICD-10-CM    1. Encounter for gynecological examination without abnormal finding Z01.419    2. Breast cancer screening Z12.31 RADIOLOGY REFERRAL ([facility])   3. Vulvar lesion N90.89 DESTRUCT BENIGN LESION, UP TO 14       PLAN:  Return 4 wks to recheck vulvar condyloma  Discussed vulvar hpv disease  mammo today  No further paps needed      Joanna Montalvo MD

## 2017-11-22 ASSESSMENT — ANXIETY QUESTIONNAIRES: GAD7 TOTAL SCORE: 0

## 2017-12-27 ENCOUNTER — OFFICE VISIT (OUTPATIENT)
Dept: OBGYN | Facility: CLINIC | Age: 72
End: 2017-12-27
Payer: COMMERCIAL

## 2017-12-27 VITALS
HEIGHT: 70 IN | BODY MASS INDEX: 23.77 KG/M2 | WEIGHT: 166 LBS | HEART RATE: 72 BPM | SYSTOLIC BLOOD PRESSURE: 104 MMHG | DIASTOLIC BLOOD PRESSURE: 52 MMHG

## 2017-12-27 DIAGNOSIS — A63.0 GENITAL WARTS: Primary | ICD-10-CM

## 2017-12-27 PROCEDURE — 17110 DESTRUCTION B9 LES UP TO 14: CPT | Performed by: OBSTETRICS & GYNECOLOGY

## 2017-12-27 NOTE — MR AVS SNAPSHOT
"              After Visit Summary   12/27/2017    Ramya Hillman    MRN: [id number]           Patient Information     Date Of Birth          1945        Visit Information        Provider Department      12/27/2017 8:00 AM Joanna Montalvo MD [facility]        Today's Diagnoses     Genital warts    -  1       Follow-ups after your visit        Who to contact     If you have questions or need follow up information about today's clinic visit or your schedule please contact [facility] directly at [phone].  Normal or non-critical lab and imaging results will be communicated to you by Aito BVhart, letter or phone within 4 business days after the clinic has received the results. If you do not hear from us within 7 days, please contact the clinic through Food[employer] or phone. If you have a critical or abnormal lab result, we will notify you by phone as soon as possible.  Submit refill requests through [employer] or call your pharmacy and they will forward the refill request to us. Please allow 3 business days for your refill to be completed.          Additional Information About Your Visit        MyChart Information     [employer] gives you secure access to your electronic health record. If you see a primary care provider, you can also send messages to your care team and make appointments. If you have questions, please call your primary care clinic.  If you do not have a primary care provider, please call [phone] and they will assist you.        Care EveryWhere ID     This is your Care EveryWhere ID. This could be used by other organizations to access your [place] medical records  [id number]        Your Vitals Were     Pulse Height BMI (Body Mass Index)             72 5' 10\" (1.778 m) 23.82 kg/m2          Blood Pressure from Last 3 Encounters:   12/27/17 104/52   11/21/17 134/76   11/20/17 139/69    Weight from Last 3 Encounters:   12/27/17 166 lb (75.3 kg)   11/21/17 164 lb " (74.4 kg)   11/20/17 165 lb (74.8 kg)              We Performed the Following     C DESTROY < 15 BENIGN SKIN LESIONS        Primary Care Provider Office Phone # Fax #    Madan Lizama [phone] [address] [place] [address]        Equal Access to Services     [facility]: Hadii aad ku hadasho Soomaali, waaxda luqadaha, qaybta kaalmada adeegyada, waxay idiin hayaan adeeg kharash la'aan . So [facility] [phone].    ATENCIÓN: Si habla español, tiene a curran disposición servicios gratuitos de asistencia lingüística. Llame al [phone].    We comply with applicable federal civil rights laws and [place] laws. We do not discriminate on the basis of race, color, national origin, age, disability, sex, sexual orientation, or gender identity.            Thank you!     Thank you for choosing [facility]  for your care. Our goal is always to provide you with excellent care. Hearing back from our patients is one way we can continue to improve our services. Please take a few minutes to complete the written survey that you may receive in the mail after your visit with us. Thank you!             Your Updated Medication List - Protect others around you: Learn how to safely use, store and throw away your medicines at www.disposemymeds.org.          This list is accurate as of: 12/27/17  8:05 AM.  Always use your most recent med list.                   Brand Name Dispense Instructions for use Diagnosis    aspirin 81 MG tablet     1 tablet    Take by mouth daily        Co Q10 100 MG Caps      Take by mouth daily        Fish Oil 1000 MG Cpdr      Take by mouth daily        Multi-vitamin Tabs tablet      Take 1 tablet by mouth daily        simvastatin 20 MG tablet    ZOCOR    90 tablet    Take 1 tablet (20 mg) by mouth At Bedtime    Hypercholesterolemia       vitamin D 1000 UNITS capsule      Take 1 capsule by mouth daily

## 2017-12-27 NOTE — PROGRESS NOTES
SUBJECTIVE:                                                   Ramya Hillman is a 72 year old female who presents to clinic today for the following health issue(s):  Patient presents with:  RECHECK: recheck vulvar condyloma      HPI:  Patient had TCA applied to genital hpv 1 month ago  Here for follow up  Says she can't feel them anymore  No bleeding or itching or ulcerations    No LMP recorded. Patient is postmenopausal..   Patient is sexually active, .  Using menopause for contraception.    reports that she has been smoking Cigarettes.  She has never used smokeless tobacco.      STD testing offered?  Declined    Health maintenance updated:  yes    Today's PHQ-2 Score:   PHQ-2 (  [employer]) 2017   Q1: Little interest or pleasure in doing things 0   Q2: Feeling down, depressed or hopeless 0   PHQ-2 Score 0     Today's PHQ-9 Score:   PHQ-9 SCORE 2017   Total Score 0     Today's NIRAV-7 Score:   NIRAV-7 SCORE 2017   Total Score 0       Problem list and histories reviewed & adjusted, as indicated.  Additional history: as documented.    Patient Active Problem List   Diagnosis     PVD (peripheral vascular disease) (H)     Chest pain     Hyperlipidemia LDL goal <100     Painful tongue     Chronic ischemic heart disease     Past Surgical History:   Procedure Laterality Date     NO HISTORY OF SURGERY        Social History   Substance Use Topics     Smoking status: Current Some Day Smoker     Types: Cigarettes     Last attempt to quit: 9/15/2016     Smokeless tobacco: Never Used      Comment:  2-3 cigs per day off and on     Alcohol use 0.0 oz/week     0 Standard drinks or equivalent per week      Problem (# of Occurrences) Relation (Name,Age of Onset)    Breast Cancer (2) Sister (80):  at 82 , Niece    CEREBROVASCULAR DISEASE (1) Mother    Coronary Artery Disease (7) Mother, Father (81): MI, Brother, Sister (52), Brother (62), Sister, Sister    Hyperlipidemia (7) Mother, Father, Brother,  "Sister, Brother, Sister, Sister    OSTEOPOROSIS (1) Mother            Current Outpatient Prescriptions   Medication Sig     simvastatin (ZOCOR) 20 MG tablet Take 1 tablet (20 mg) by mouth At Bedtime     aspirin 81 MG tablet Take by mouth daily     Omega-3 Fatty Acids (FISH OIL) 1000 MG CPDR Take by mouth daily     Cholecalciferol (VITAMIN D) 1000 UNITS capsule Take 1 capsule by mouth daily     multivitamin, therapeutic with minerals (MULTI-VITAMIN) TABS Take 1 tablet by mouth daily     Coenzyme Q10 (CO Q10) 100 MG CAPS Take by mouth daily     No current facility-administered medications for this visit.      Allergies   Allergen Reactions     No Known Allergies        ROS:  12 point review of systems negative other than symptoms noted below.    OBJECTIVE:     /52  Pulse 72  Ht 5' 10\" (1.778 m)  Wt 166 lb (75.3 kg)  BMI 23.82 kg/m2  Body mass index is 23.82 kg/(m^2).    Exam:  Constitutional:  Appearance: Well nourished, well developed alert, in no acute distress  Chest:  Respiratory Effort:  Breathing unlabored  Skin:General Inspection:  No rashes present, no lesions present, no areas of discoloration; Genitalia and Groin:  No rashes present, no lesions present, no areas of discoloration, no masses present.  Neurologic/Psychiatric:  Mental Status:  Oriented X3   Pelvic Exam:  External Genitalia:     Normal appearance for age, no discharge present, no tenderness present, one small flat condyloma remains, color normal  Vagina:     Normal vaginal vault without central or paravaginal defects, no discharge present, no inflammatory lesions present, no masses present  Bladder:     Nontender to palpation  Urethra:   Urethral Body:  Urethra palpation normal, urethra structural support normal   Urethral Meatus:  No erythema or lesions present  Cervix:     Appearance healthy, no lesions present, nontender to palpation, no bleeding present  Uterus:     Uterus: firm, normal sized and nontender, midplane in position. "   Adnexa:     No adnexal tenderness present, no adnexal masses present  Perineum:     Perineum within normal limits, no evidence of trauma, no rashes or skin lesions present  Anus:     Anus within normal limits, no hemorrhoids present  Inguinal Lymph Nodes:     No lymphadenopathy present  Pubic Hair:     Normal pubic hair distribution for age  Genitalia and Groin:     No rashes present, no lesions present, no areas of discoloration, no masses present       In-Clinic Test Results:  No results found for this or any previous visit (from the past 24 hour(s)).    ASSESSMENT/PLAN:                                                        ICD-10-CM    1. Genital warts A63.0 C DESTROY < 15 BENIGN SKIN LESIONS       Applied TCA to area of flat condyloma with cotton tip applicator  Patient noted moderate pain which resolved with observation    There are no Patient Instructions on file for this visit.    Return if she notes recurrent lesions  Good effect noted from acid applications    Joanna Montalvo MD  [facility]

## 2018-01-02 ENCOUNTER — OFFICE VISIT (OUTPATIENT)
Dept: FAMILY MEDICINE | Facility: CLINIC | Age: 73
End: 2018-01-02
Payer: COMMERCIAL

## 2018-01-02 VITALS
SYSTOLIC BLOOD PRESSURE: 154 MMHG | OXYGEN SATURATION: 98 % | WEIGHT: 166 LBS | TEMPERATURE: 97.9 F | HEART RATE: 76 BPM | DIASTOLIC BLOOD PRESSURE: 83 MMHG | BODY MASS INDEX: 23.77 KG/M2 | HEIGHT: 70 IN

## 2018-01-02 DIAGNOSIS — I73.9 PVD (PERIPHERAL VASCULAR DISEASE) (H): Primary | ICD-10-CM

## 2018-01-02 DIAGNOSIS — K14.6 PAINFUL TONGUE: ICD-10-CM

## 2018-01-02 DIAGNOSIS — I25.9 CHRONIC ISCHEMIC HEART DISEASE: ICD-10-CM

## 2018-01-02 PROCEDURE — 99214 OFFICE O/P EST MOD 30 MIN: CPT | Performed by: INTERNAL MEDICINE

## 2018-01-02 RX ORDER — GABAPENTIN 100 MG/1
100 CAPSULE ORAL 3 TIMES DAILY
Qty: 90 CAPSULE | Refills: 1 | Status: SHIPPED | OUTPATIENT
Start: 2018-01-02 | End: 2019-08-19

## 2018-01-02 NOTE — NURSING NOTE
"Chief Complaint   Patient presents with     RECHECK       Initial /83 (BP Location: Left arm, Patient Position: Sitting, Cuff Size: Adult Regular)  Pulse 76  Temp 97.9  F (36.6  C) (Tympanic)  Ht 5' 10\" (1.778 m)  Wt 166 lb (75.3 kg)  SpO2 98%  Breastfeeding? No  BMI 23.82 kg/m2 Estimated body mass index is 23.82 kg/(m^2) as calculated from the following:    Height as of this encounter: 5' 10\" (1.778 m).    Weight as of this encounter: 166 lb (75.3 kg).  Medication Reconciliation: complete   Danni Campbell- CMA      "

## 2018-01-02 NOTE — MR AVS SNAPSHOT
"              After Visit Summary   1/2/2018    Ramya Hillman    MRN: [id number]           Patient Information     Date Of Birth          1945        Visit Information        Provider Department      1/2/2018 11:30 AM Madan Lizama MD [facility]        Today's Diagnoses     PVD (peripheral vascular disease) (H)    -  1    Painful tongue        Chronic ischemic heart disease           Follow-ups after your visit        Who to contact     If you have questions or need follow up information about today's clinic visit or your schedule please contact [facility] directly at [phone].  Normal or non-critical lab and imaging results will be communicated to you by Ernie'shart, letter or phone within 4 business days after the clinic has received the results. If you do not hear from us within 7 days, please contact the clinic through [employer] or phone. If you have a critical or abnormal lab result, we will notify you by phone as soon as possible.  Submit refill requests through [employer] or call your pharmacy and they will forward the refill request to us. Please allow 3 business days for your refill to be completed.          Additional Information About Your Visit        MyChart Information     [employer] gives you secure access to your electronic health record. If you see a primary care provider, you can also send messages to your care team and make appointments. If you have questions, please call your primary care clinic.  If you do not have a primary care provider, please call [phone] and they will assist you.        Care EveryWhere ID     This is your Care EveryWhere ID. This could be used by other organizations to access your [place] medical records  [id number]        Your Vitals Were     Pulse Temperature Height Pulse Oximetry Breastfeeding? BMI (Body Mass Index)    76 97.9  F (36.6  C) (Tympanic) 5' 10\" (1.778 m) 98% No 23.82 kg/m2       Blood Pressure from Last 3 Encounters: "   01/02/18 154/83   12/27/17 104/52   11/21/17 134/76    Weight from Last 3 Encounters:   01/02/18 166 lb (75.3 kg)   12/27/17 166 lb (75.3 kg)   11/21/17 164 lb (74.4 kg)              Today, you had the following     No orders found for display         Today's Medication Changes          These changes are accurate as of: 1/2/18 11:59 PM.  If you have any questions, ask your nurse or doctor.               Start taking these medicines.        Dose/Directions    gabapentin 100 MG capsule   Commonly known as:  NEURONTIN   Used for:  Painful tongue   Started by:  Madan Lizama MD        Dose:  100 mg   Take 1 capsule (100 mg) by mouth 3 times daily   Quantity:  90 capsule   Refills:  1            Where to get your medicines      Some of these will need a paper prescription and others can be bought over the counter.  Ask your nurse if you have questions.     Bring a paper prescription for each of these medications     gabapentin 100 MG capsule                Primary Care Provider Office Phone # Fax #    Madan Lizama [phone] [address]  [facility] [address]        Equal Access to Services     [facility]: Hadii aad ku hadasho Soraquelali, waaxda luqadaha, qaybta kaalmada adeegyada, ayla sosa . So [facility] [phone].    ATENCIÓN: Si habla español, tiene a curran disposición servicios gratuitos de asistencia lingüística. Llame al [phone].    We comply with applicable federal civil rights laws and [place] laws. We do not discriminate on the basis of race, color, national origin, age, disability, sex, sexual orientation, or gender identity.            Thank you!     Thank you for choosing [facility]  for your care. Our goal is always to provide you with excellent care. Hearing back from our patients is one way we can continue to improve our services. Please take a few minutes to complete the written survey that you may receive in the  mail after your visit with us. Thank you!             Your Updated Medication List - Protect others around you: Learn how to safely use, store and throw away your medicines at www.disposemymeds.org.          This list is accurate as of: 1/2/18 11:59 PM.  Always use your most recent med list.                   Brand Name Dispense Instructions for use Diagnosis    aspirin 81 MG tablet     1 tablet    Take by mouth daily        Co Q10 100 MG Caps      Take by mouth daily        Fish Oil 1000 MG Cpdr      Take by mouth daily        gabapentin 100 MG capsule    NEURONTIN    90 capsule    Take 1 capsule (100 mg) by mouth 3 times daily    Painful tongue       Multi-vitamin Tabs tablet      Take 1 tablet by mouth daily        simvastatin 20 MG tablet    ZOCOR    90 tablet    Take 1 tablet (20 mg) by mouth At Bedtime    Hypercholesterolemia       vitamin D 1000 UNITS capsule      Take 1 capsule by mouth daily

## 2018-01-02 NOTE — PROGRESS NOTES
SUBJECTIVE:   aRmya Hillman is a 72 year old female who presents to clinic today for the following health issues:    Tongue pain      Duration: [id number]    Description (location/character/radiation): left sided     Intensity:  mild    Accompanying signs and symptoms: constant. Sting/throbbing.    History (similar episodes/previous evaluation): None    Precipitating or alleviating factors: None    Therapies tried and outcome: None     She remians concerned about he the etiology of her tongue. Because of the ongoing sensations of the tongue she wanted to review options of treatment.    Problem list and histories reviewed & adjusted, as indicated.  Additional history: as documented    Current Outpatient Prescriptions   Medication Sig Dispense Refill     gabapentin (NEURONTIN) 100 MG capsule Take 1 capsule (100 mg) by mouth 3 times daily 90 capsule 1     simvastatin (ZOCOR) 20 MG tablet Take 1 tablet (20 mg) by mouth At Bedtime 90 tablet 4     aspirin 81 MG tablet Take by mouth daily 1 tablet      Omega-3 Fatty Acids (FISH OIL) 1000 MG CPDR Take by mouth daily       Cholecalciferol (VITAMIN D) 1000 UNITS capsule Take 1 capsule by mouth daily       multivitamin, therapeutic with minerals (MULTI-VITAMIN) TABS Take 1 tablet by mouth daily       Coenzyme Q10 (CO Q10) 100 MG CAPS Take by mouth daily       Allergies   Allergen Reactions     No Known Allergies      Reviewed and updated as needed this visit by clinical staffTobacco  Allergies  Meds       Reviewed and updated as needed this visit by Provider       ROS:  Constitutional, HEENT, cardiovascular, pulmonary, gi and gu systems are negative, except as otherwise noted.    This document serves as a record of the services and decisions personally performed and made by Madan Lizama MD. It was created on his/her behalf by Lucy Mercado, a trained medical scribe. The creation of this document is based the provider's statements to the medical scribe.  Scribe  "Lucy Mercado 1:20 PM, January 2, 2018     OBJECTIVE:   /83 (BP Location: Left arm, Patient Position: Sitting, Cuff Size: Adult Regular)  Pulse 76  Temp 97.9  F (36.6  C) (Tympanic)  Ht 1.778 m (5' 10\")  Wt 75.3 kg (166 lb)  SpO2 98%  Breastfeeding? No  BMI 23.82 kg/m2  Body mass index is 23.82 kg/(m^2).   HEENT: face symmetrical, jaw nontender, nose and throat clear. tongue symmetrical without signs of plaque, overgrowth or lesions. The underlying tongue does show prominent venules.   Neck was supple without adenopathy or thyromegaly her carotids were normal without bruits  Chest clear to auscultation and percussion  Cardiovascular S1 and S2 are physiologic without murmurs or gallops  Abdomen bowel sounds were normal.  There is no palpable mass or organomegaly  Extremities nontender without any edema  Pulses pedal pulses are as described otherwise his pulses are bilaterally symmetrical throughout without bruits  Skin without significant abnormality     ASSESSMENT/PLAN:   1. Painful tongue  Begin gabapentin at dose and regimen below. returning to clinic in one month to continue vigilance in ruling out other etiology of painful tongue.   - gabapentin (NEURONTIN) 100 MG capsule; Take 1 capsule (100 mg) by mouth 3 times daily  Dispense: 90 capsule; Refill: 1    2. PVD (peripheral vascular disease) (H)    3. Chronic ischemic heart disease    Madan Lizama MD  [facility]    The information in this document, created by the medical scribe for me, accurately reflects the services I personally performed and the decisions made by me. I have reviewed and approved this document for accuracy prior to leaving the patient care area.  Madan Lizama MD  1:20 PM, 01/02/18     "

## 2018-06-02 ENCOUNTER — HEALTH MAINTENANCE LETTER (OUTPATIENT)
Age: 73
End: 2018-06-02

## 2018-10-15 ENCOUNTER — TRANSFERRED RECORDS (OUTPATIENT)
Dept: HEALTH INFORMATION MANAGEMENT | Facility: CLINIC | Age: 73
End: 2018-10-15

## 2018-11-20 ASSESSMENT — ACTIVITIES OF DAILY LIVING (ADL): CURRENT_FUNCTION: NO ASSISTANCE NEEDED

## 2018-11-21 ENCOUNTER — OFFICE VISIT (OUTPATIENT)
Dept: FAMILY MEDICINE | Facility: CLINIC | Age: 73
End: 2018-11-21
Payer: COMMERCIAL

## 2018-11-21 VITALS
WEIGHT: 168.4 LBS | DIASTOLIC BLOOD PRESSURE: 72 MMHG | SYSTOLIC BLOOD PRESSURE: 122 MMHG | HEIGHT: 70 IN | OXYGEN SATURATION: 98 % | TEMPERATURE: 98.6 F | HEART RATE: 83 BPM | BODY MASS INDEX: 24.11 KG/M2

## 2018-11-21 DIAGNOSIS — K14.6 PAINFUL TONGUE: ICD-10-CM

## 2018-11-21 DIAGNOSIS — E55.9 VITAMIN D DEFICIENCY: ICD-10-CM

## 2018-11-21 DIAGNOSIS — R53.83 FATIGUE, UNSPECIFIED TYPE: ICD-10-CM

## 2018-11-21 DIAGNOSIS — Z00.00 ENCOUNTER FOR ROUTINE ADULT HEALTH EXAMINATION WITHOUT ABNORMAL FINDINGS: ICD-10-CM

## 2018-11-21 DIAGNOSIS — I73.9 PVD (PERIPHERAL VASCULAR DISEASE) (H): Primary | ICD-10-CM

## 2018-11-21 DIAGNOSIS — R82.90 NONSPECIFIC FINDING ON EXAMINATION OF URINE: ICD-10-CM

## 2018-11-21 DIAGNOSIS — E78.5 HYPERLIPIDEMIA LDL GOAL <100: ICD-10-CM

## 2018-11-21 DIAGNOSIS — I25.9 CHRONIC ISCHEMIC HEART DISEASE: ICD-10-CM

## 2018-11-21 LAB
ALBUMIN SERPL-MCNC: 4 G/DL (ref 3.4–5)
ALBUMIN UR-MCNC: NEGATIVE MG/DL
ALP SERPL-CCNC: 67 U/L (ref 40–150)
ALT SERPL W P-5'-P-CCNC: 22 U/L (ref 0–50)
ANION GAP SERPL CALCULATED.3IONS-SCNC: 8 MMOL/L (ref 3–14)
APPEARANCE UR: CLEAR
AST SERPL W P-5'-P-CCNC: 18 U/L (ref 0–45)
BACTERIA #/AREA URNS HPF: ABNORMAL /HPF
BILIRUB SERPL-MCNC: 0.5 MG/DL (ref 0.2–1.3)
BILIRUB UR QL STRIP: NEGATIVE
BUN SERPL-MCNC: 20 MG/DL (ref 7–30)
CALCIUM SERPL-MCNC: 9.2 MG/DL (ref 8.5–10.1)
CHLORIDE SERPL-SCNC: 107 MMOL/L (ref 94–109)
CHOLEST SERPL-MCNC: 157 MG/DL
CO2 SERPL-SCNC: 26 MMOL/L (ref 20–32)
COLOR UR AUTO: YELLOW
CREAT SERPL-MCNC: 0.78 MG/DL (ref 0.52–1.04)
ERYTHROCYTE [DISTWIDTH] IN BLOOD BY AUTOMATED COUNT: 14.1 % (ref 10–15)
GFR SERPL CREATININE-BSD FRML MDRD: 73 ML/MIN/1.7M2
GLUCOSE SERPL-MCNC: 87 MG/DL (ref 70–99)
GLUCOSE UR STRIP-MCNC: NEGATIVE MG/DL
HCT VFR BLD AUTO: 42.1 % (ref 35–47)
HDLC SERPL-MCNC: 55 MG/DL
HGB BLD-MCNC: 13.5 G/DL (ref 11.7–15.7)
HGB UR QL STRIP: NEGATIVE
KETONES UR STRIP-MCNC: NEGATIVE MG/DL
LDLC SERPL CALC-MCNC: 68 MG/DL
LEUKOCYTE ESTERASE UR QL STRIP: ABNORMAL
MCH RBC QN AUTO: 30.9 PG (ref 26.5–33)
MCHC RBC AUTO-ENTMCNC: 32.1 G/DL (ref 31.5–36.5)
MCV RBC AUTO: 96 FL (ref 78–100)
MUCOUS THREADS #/AREA URNS LPF: PRESENT /LPF
NITRATE UR QL: NEGATIVE
NON-SQ EPI CELLS #/AREA URNS LPF: ABNORMAL /LPF
NONHDLC SERPL-MCNC: 102 MG/DL
PH UR STRIP: 5.5 PH (ref 5–7)
PLATELET # BLD AUTO: 265 10E9/L (ref 150–450)
POTASSIUM SERPL-SCNC: 4 MMOL/L (ref 3.4–5.3)
PROT SERPL-MCNC: 7.7 G/DL (ref 6.8–8.8)
RBC # BLD AUTO: 4.37 10E12/L (ref 3.8–5.2)
RBC #/AREA URNS AUTO: ABNORMAL /HPF
SODIUM SERPL-SCNC: 141 MMOL/L (ref 133–144)
SOURCE: ABNORMAL
SP GR UR STRIP: 1.02 (ref 1–1.03)
TRIGL SERPL-MCNC: 169 MG/DL
TSH SERPL DL<=0.005 MIU/L-ACNC: 2.53 MU/L (ref 0.4–4)
UROBILINOGEN UR STRIP-ACNC: 0.2 EU/DL (ref 0.2–1)
WBC # BLD AUTO: 7.1 10E9/L (ref 4–11)
WBC #/AREA URNS AUTO: ABNORMAL /HPF

## 2018-11-21 PROCEDURE — 81001 URINALYSIS AUTO W/SCOPE: CPT | Performed by: INTERNAL MEDICINE

## 2018-11-21 PROCEDURE — 80061 LIPID PANEL: CPT | Performed by: INTERNAL MEDICINE

## 2018-11-21 PROCEDURE — 85027 COMPLETE CBC AUTOMATED: CPT | Performed by: INTERNAL MEDICINE

## 2018-11-21 PROCEDURE — 84443 ASSAY THYROID STIM HORMONE: CPT | Performed by: INTERNAL MEDICINE

## 2018-11-21 PROCEDURE — 82306 VITAMIN D 25 HYDROXY: CPT | Performed by: INTERNAL MEDICINE

## 2018-11-21 PROCEDURE — 99397 PER PM REEVAL EST PAT 65+ YR: CPT | Performed by: INTERNAL MEDICINE

## 2018-11-21 PROCEDURE — 36415 COLL VENOUS BLD VENIPUNCTURE: CPT | Performed by: INTERNAL MEDICINE

## 2018-11-21 PROCEDURE — 80053 COMPREHEN METABOLIC PANEL: CPT | Performed by: INTERNAL MEDICINE

## 2018-11-21 PROCEDURE — 87086 URINE CULTURE/COLONY COUNT: CPT | Performed by: INTERNAL MEDICINE

## 2018-11-21 RX ORDER — CIDER VINEGAR 300 MG
TABLET ORAL DAILY
Status: CANCELLED | OUTPATIENT
Start: 2018-11-21

## 2018-11-21 ASSESSMENT — ACTIVITIES OF DAILY LIVING (ADL): CURRENT_FUNCTION: NO ASSISTANCE NEEDED

## 2018-11-21 NOTE — NURSING NOTE
"Chief Complaint   Patient presents with     Medicare Visit     Fall     recent fall, hit head, now having some throbbing and pain     /72  Pulse 83  Temp 98.6  F (37  C) (Tympanic)  Ht 5' 10\" (1.778 m)  Wt 168 lb 6.4 oz (76.4 kg)  SpO2 98%  BMI 24.16 kg/m2 Estimated body mass index is 24.16 kg/(m^2) as calculated from the following:    Height as of this encounter: 5' 10\" (1.778 m).    Weight as of this encounter: 168 lb 6.4 oz (76.4 kg).        Health Maintenance due pending provider review:  Mammogram    Has appointment first week of December    Selin Heath CMA  "

## 2018-11-21 NOTE — PROGRESS NOTES
"SUBJECTIVE:   Ramya Hillman is a 73 year old female who presents for Preventive Visit.    Are you in the first 12 months of your Medicare coverage?  No    Annual Wellness Visit     In general, how would you rate your overall health?  Good    Frequency of exercise:  1 day/week    Duration of exercise:  Less than 15 minutes    Do you usually eat at least 4 servings of fruit and vegetables a day, include whole grains    & fiber and avoid regularly eating high fat or \"junk\" foods?  Yes    Taking medications regularly:  Yes    Medication side effects:  Not applicable    Ability to successfully perform activities of daily living:  No assistance needed    Home Safety:  No safety concerns identified    Hearing Impairment:  No hearing concerns    In the past 6 months, have you been bothered by leaking of urine?  No    In general, how would you rate your overall mental or emotional health?  Good    PHQ-2 Total Score: 0    Additional concerns today:  Yes    Fall      Fall risk:  Fallen 2 or more times in the past year?: No  Any fall with injury in the past year?: Yes  Timed Up and Go Test (>13.5 is fall risk; contact physician) : 5    COGNITIVE SCREEN  1) Repeat 3 items (Leader, Season, Table)    2) Clock draw: NORMAL  3) 3 item recall: Recalls 3 objects  Results: 3 items recalled: COGNITIVE IMPAIRMENT LESS LIKELY    Mini-CogTM Copyright S Fernando. Licensed by the author for use in [facility]; reprinted with permission (cameron@[email or web address]). All rights reserved.        Reviewed and updated as needed this visit by clinical staff  Tobacco  Allergies  Meds  Med Hx  Surg Hx  Fam Hx  Soc Hx        Reviewed and updated as needed this visit by Provider        Social History   Substance Use Topics     Smoking status: Current Some Day Smoker     Years: 30.00     Types: Cigarettes     Start date: 1/1/1990     Last attempt to quit: 8/1/2018     Smokeless tobacco: Never Used      Comment:  2-3 cigs per day off and on     " Alcohol use 0.0 oz/week     0 Standard drinks or equivalent per week       Alcohol Use 11/20/2018   If you drink alcohol do you typically have greater than 3 drinks per day OR greater than 7 drinks per week? No   No flowsheet data found.    Do you feel safe in your environment - YES    Do you have a Health Care Directive?: No: Advance care planning reviewed with patient; information given to patient to review.    Current providers sharing in care for this patient include:   Patient Care Team:  Madan Lizama MD as PCP - General (Internal Medicine)    The following health maintenance items are reviewed in Epic and correct as of today:  Health Maintenance   Topic Date Due     HEPATITIS C SCREENING  03/29/1963     ADVANCE DIRECTIVE PLANNING Q5 YRS  03/29/2002     FALL RISK ASSESSMENT  11/21/2018     MAMMO Q1 YR  11/21/2018     PHQ-2 Q1 YR  11/21/2018     LIPID SCREEN Q5 YR FEMALE (SYSTEM ASSIGNED)  11/02/2022     COLONOSCOPY Q5 YR  10/15/2023     TETANUS IMMUNIZATION (SYSTEM ASSIGNED)  11/02/2027     DEXA SCAN SCREENING (SYSTEM ASSIGNED)  Completed     PNEUMOCOCCAL  Completed     INFLUENZA VACCINE  Completed     Labs reviewed in EPIC  Patient Active Problem List   Diagnosis     PVD (peripheral vascular disease) (H)     Chest pain     Hyperlipidemia LDL goal <100     Painful tongue     Chronic ischemic heart disease     Past Surgical History:   Procedure Laterality Date     BIOPSY  3/1/2018    Mole on back     COLONOSCOPY  10/18/2018    1 polyp - all clear     NO HISTORY OF SURGERY       ORTHOPEDIC SURGERY  1/1/2012    Rotator Cuff - Shoulder       Social History   Substance Use Topics     Smoking status: Current Some Day Smoker     Years: 30.00     Types: Cigarettes     Start date: 1/1/1990     Last attempt to quit: 8/1/2018     Smokeless tobacco: Never Used      Comment:  2-3 cigs per day off and on     Alcohol use 0.0 oz/week     0 Standard drinks or equivalent per week     Family History   Problem Relation Age of  "Onset     Cerebrovascular Disease Mother      Coronary Artery Disease Mother           Osteoporosis Mother      Hyperlipidemia Mother      Coronary Artery Disease Father 81          Hyperlipidemia Father      Coronary Artery Disease Brother           Hyperlipidemia Brother      Coronary Artery Disease Sister 52          Hyperlipidemia Sister      Coronary Artery Disease Brother 62     Hyperlipidemia Brother      Coronary Artery Disease Sister      Hyperlipidemia Sister      Coronary Artery Disease Sister      Hyperlipidemia Sister      Breast Cancer Sister 80          Breast Cancer Niece      Coronary Artery Disease Sister      Coronary Artery Disease Sister            Review of Systems  CONSTITUTIONAL: NEGATIVE for fever, chills, change in weight  INTEGUMENTARY/SKIN: NEGATIVE for worrisome rashes, moles or lesions  EYES: NEGATIVE for vision changes or irritation  ENT/MOUTH: NEGATIVE for ear, mouth and throat problems  RESP: NEGATIVE for significant cough or SOB  BREAST: NEGATIVE for masses, tenderness or discharge  CV: NEGATIVE for chest pain, palpitations or peripheral edema  GI: NEGATIVE for nausea, abdominal pain, heartburn, or change in bowel habits  : NEGATIVE for frequency, dysuria, or hematuria  MUSCULOSKELETAL: NEGATIVE for significant arthralgias or myalgia  NEURO: NEGATIVE for weakness, dizziness or paresthesias  ENDOCRINE: NEGATIVE for temperature intolerance, skin/hair changes  HEME: NEGATIVE for bleeding problems  PSYCHIATRIC: NEGATIVE for changes in mood or affect    OBJECTIVE:   /72  Pulse 83  Temp 98.6  F (37  C) (Tympanic)  Ht 1.778 m (5' 10\")  Wt 76.4 kg (168 lb 6.4 oz)  SpO2 98%  BMI 24.16 kg/m2 Estimated body mass index is 24.16 kg/(m^2) as calculated from the following:    Height as of this encounter: 1.778 m (5' 10\").    Weight as of this encounter: 76.4 kg (168 lb 6.4 oz).     Physical Exam  HEENT funduscopic exam was within normal limits " tympanic membranes are normal nose and throat were clear  Neck was supple without adenopathy thyromegaly or masses  Chest clear to auscultation and percussion  Cardiovascular S1 and S2 are physiologic without murmurs or gallop rhythm was regular  Abdomen bowel sounds are normal there are no palpable masses organomegaly or tenderness  Breasts showed no palpable masses , lymph nodes were negative   introitus was unremarkable a vaginal canal was normal cervix was normal uterus was small adnexa was within normal limits  Rectal anus was normal prostate was smooth  Extremities were nontender without any edema  Deep tendon reflexes were intact  Skin was clear  Pulses were two over 4+ bilaterally symmetrical throughout    ASSESSMENT / PLAN:   Ramya was seen today for medicare visit and fall.    Diagnoses and all orders for this visit:    PVD (peripheral vascular disease) (H)    Hyperlipidemia LDL goal <100  -     Lipid panel reflex to direct LDL Fasting    Painful tongue    Chronic ischemic heart disease    Encounter for routine adult health examination without abnormal findings  -     UA reflex to Microscopic and Culture  -     CBC with platelets  -     Comprehensive metabolic panel  -     Lipid panel reflex to direct LDL Fasting  -     TSH with free T4 reflex  -     Vitamin D Deficiency    Fatigue, unspecified type  -     TSH with free T4 reflex    Vitamin D deficiency  -     Vitamin D Deficiency    Other orders  -     Omega-3 Fatty Acids (FISH OIL) 1000 MG CPDR; Take by mouth daily        End of Life Planning:  Patient currently has an advanced directive: No.  I have verified the patient's ablity to prepare an advanced directive/make health care decisions.  Literature was provided to assist patient in preparing an advanced directive.    COUNSELING:  Reviewed preventive health counseling, as reflected in patient instructions       Regular exercise       Healthy diet/nutrition       Vision screening       Hearing  "screening    BP Readings from Last 1 Encounters:   11/21/18 122/72     Estimated body mass index is 24.16 kg/(m^2) as calculated from the following:    Height as of this encounter: 1.778 m (5' 10\").    Weight as of this encounter: 76.4 kg (168 lb 6.4 oz).     reports that she has been smoking Cigarettes.  She started smoking about 28 years ago. She has smoked for the past 30.00 years. She has never used smokeless tobacco.    Appropriate preventive services were discussed with this patient, including applicable screening as appropriate for cardiovascular disease, diabetes, osteopenia/osteoporosis, and glaucoma.  As appropriate for age/gender, discussed screening for colorectal cancer, prostate cancer, breast cancer, and cervical cancer. Checklist reviewing preventive services available has been given to the patient.    Reviewed patients plan of care and provided an AVS. The Basic Care Plan (routine screening as documented in Health Maintenance) for Ramya meets the Care Plan requirement. This Care Plan has been established and reviewed with the Patient.    Counseling Resources:  ATP IV Guidelines  Pooled Cohorts Equation Calculator  Breast Cancer Risk Calculator  FRAX Risk Assessment  ICSI Preventive Guidelines  Dietary Guidelines for Americans, 2010  USDA's MyPlate  ASA Prophylaxis  Lung CA Screening    Madan Lizama MD  [facility]  "

## 2018-11-21 NOTE — PATIENT INSTRUCTIONS

## 2018-11-22 LAB
BACTERIA SPEC CULT: NORMAL
SPECIMEN SOURCE: NORMAL

## 2018-11-23 ENCOUNTER — OFFICE VISIT (OUTPATIENT)
Dept: PODIATRY | Facility: CLINIC | Age: 73
End: 2018-11-23
Payer: COMMERCIAL

## 2018-11-23 VITALS
WEIGHT: 168 LBS | SYSTOLIC BLOOD PRESSURE: 163 MMHG | BODY MASS INDEX: 24.05 KG/M2 | HEIGHT: 70 IN | DIASTOLIC BLOOD PRESSURE: 96 MMHG

## 2018-11-23 DIAGNOSIS — L84 CALLUS: ICD-10-CM

## 2018-11-23 DIAGNOSIS — M20.41 HAMMER TOE OF RIGHT FOOT: ICD-10-CM

## 2018-11-23 DIAGNOSIS — M21.619 BUNION: Primary | ICD-10-CM

## 2018-11-23 LAB — DEPRECATED CALCIDIOL+CALCIFEROL SERPL-MC: 34 UG/L (ref 20–75)

## 2018-11-23 PROCEDURE — 99203 OFFICE O/P NEW LOW 30 MIN: CPT | Performed by: PODIATRIST

## 2018-11-23 NOTE — LETTER
11/23/2018         RE: Ramya Hillman  [address]  [place] [facility] [address]        Dear Colleague,    Thank you for referring your patient, Ramya Hillman, to the [facility]. Please see a copy of my visit note below.    PATIENT HISTORY:  Ramya Hillman is a 73 year old female who presents to clinic for R foot bunion pain.  Also 2nd hammertoe which rubs in shoes.  No injury.  2-7/10 pain.  Worse with activity, better with rest.  She is considering surgery.  Present for years.          Review of Systems:  Patient denies fever, chills, rash, wound, stiffness, limping, numbness, weakness, heart burn, blood in stool, chest pain with activity, calf pain when walking, shortness of breath with activity, chronic cough, easy bleeding/bruising, swelling of ankles, excessive thirst, fatigue, depression, anxiety.       PAST MEDICAL HISTORY:   Past Medical History:   Diagnosis Date     Arthritis 1/01/2000    Hands & feet     Chest pain      Condyloma     had for5 years, 20 years ago,recurrence 2012 biopsy taken     Family history of heart disease      Hyperlipidemia      PVD (peripheral vascular disease) (H)      Tobacco abuse         PAST SURGICAL HISTORY:   Past Surgical History:   Procedure Laterality Date     BIOPSY  3/1/2018    Mole on back     COLONOSCOPY  10/18/2018    1 polyp - all clear     NO HISTORY OF SURGERY       ORTHOPEDIC SURGERY  1/1/2012    Rotator Cuff - Shoulder        MEDICATIONS:   Current Outpatient Prescriptions:      aspirin 81 MG tablet, Take by mouth daily, Disp: 1 tablet, Rfl:      Cholecalciferol (VITAMIN D) 1000 UNITS capsule, Take 1 capsule by mouth daily, Disp: , Rfl:      Coenzyme Q10 (CO Q10) 100 MG CAPS, Take by mouth daily, Disp: , Rfl:      multivitamin, therapeutic with minerals (MULTI-VITAMIN) TABS, Take 1 tablet by mouth daily, Disp: , Rfl:      Omega-3 Fatty Acids (FISH OIL) 1000 MG CPDR, Take by mouth daily, Disp: , Rfl:      simvastatin (ZOCOR) 20  MG tablet, Take 1 tablet (20 mg) by mouth At Bedtime, Disp: 90 tablet, Rfl: 4     gabapentin (NEURONTIN) 100 MG capsule, Take 1 capsule (100 mg) by mouth 3 times daily (Patient not taking: Reported on 2018), Disp: 90 capsule, Rfl: 1     ALLERGIES:    Allergies   Allergen Reactions     No Known Allergies         SOCIAL HISTORY:   Social History     Social History     Marital status:      Spouse name: N/A     Number of children: 0     Years of education: N/A     Occupational History     Sales      Social History Main Topics     Smoking status: Current Some Day Smoker     Years: 30.00     Types: Cigarettes     Start date: 1990     Last attempt to quit: 2018     Smokeless tobacco: Never Used      Comment:  2-3 cigs per day off and on     Alcohol use 0.0 oz/week     0 Standard drinks or equivalent per week     Drug use: No     Sexual activity: Not Currently     Birth control/ protection: Post-menopausal      Comment: postmenopausal     Other Topics Concern     Caffeine Concern No     1 a day. mostly decaf     Exercise No     likes to walk     Parent/Sibling W/ Cabg, Mi Or Angioplasty Before 65f 55m? Yes     Social History Narrative        FAMILY HISTORY:   Family History   Problem Relation Age of Onset     Cerebrovascular Disease Mother      Coronary Artery Disease Mother           Osteoporosis Mother      Hyperlipidemia Mother      Coronary Artery Disease Father 81          Hyperlipidemia Father      Coronary Artery Disease Brother           Hyperlipidemia Brother      Coronary Artery Disease Sister 52          Hyperlipidemia Sister      Coronary Artery Disease Brother 62     Hyperlipidemia Brother      Coronary Artery Disease Sister      Hyperlipidemia Sister      Coronary Artery Disease Sister      Hyperlipidemia Sister      Breast Cancer Sister 80          Breast Cancer Niece      Coronary Artery Disease Sister      Coronary Artery Disease Sister        "  EXAM:Vitals: BP (!) 163/96  Ht 1.778 m (5' 10\")  Wt 76.2 kg (168 lb)  BMI 24.11 kg/m2  BMI= Body mass index is 24.11 kg/(m^2).    General appearance: Patient is alert and fully cooperative with history & exam.  No sign of distress is noted during the visit.     Psychiatric: Affect is pleasant & appropriate.  Patient appears motivated to improve health.     Respiratory: Breathing is regular & unlabored while sitting.     HEENT: Hearing is intact to spoken word.  Speech is clear.  No gross evidence of visual impairment that would impact ambulation.     Dermatologic: Callus to medial R 2nd toe. No paronychia or evidence of soft tissue infection is noted.     Vascular: DP & PT pulses are intact & regular on the R.  No significant edema or varicosities noted.  CFT and skin temperature are normal to both lower extremities.     Neurologic: Lower extremity sensation is intact to light touch.  No evidence of weakness or contracture in the lower extremities.  No evidence of neuropathy.     Musculoskeletal: Hallux abducto valgus is noted with R foot exam. Lateral deviation of the first toe is appreciated. A bump is noted on the medial aspect of the first metatarsal head. Evidence of soft tissue irritation is noted over the dorsal medial aspect of the first metatarsal head. No significant sesamoid pain is noted plantarly. I do not appreciate significant pain with joint motion. R 2nd hammertoe noted.  Patient is ambulatory without assistive device or brace.  No gross ankle deformity noted.  No foot or ankle joint effusion is noted.     ASSESSMENT:   R foot bunion, hammertoe     PLAN:  Reviewed patient's chart in epic.  Discussed condition and treatment options including pros and cons.    We discussed treatment alternatives regarding bunion pain and deformity.  I explained that bunion surgery is mostly to reduce the size of the medial bump although an attempt is made to improve the first toe alignment.  Bunion deformity is " usually progressive and further drifting of the toe may occur even with surgery.      Non-operative treatments were discussed including wide fitting shoes, splints, pads and orthotics.  Wide fitting shoes are likely the most useful non-surgical treatment.  I would not expect much improvement from NSAIDs, injection or bunion night splints.     Hammertoe treatment options were discussed.  This included both surgical and non-surgical treatment alternatives.  Non-surgical options include wide fitting shoes, deep toe box, crest pad or foam pads, foot orthotics and debridement of any callous as necessary.  Surgical treatments were explained.    Patient is aware that surgery is elective, can be avoided if desired.  The recovery process was discussed including impact to work, walking, shoes and daily activities.  I would anticipate up to 12 months for maximum recovery after surgery.     Pt with hx of smoking, PAD.  Exam w/o obvious deficit, but consider noninvasive vascular exam prior to any surgery.    Pt considering her options for now.  F/u prn.    Barrington Suárez DPM, FACFAS    Patient to follow up with Primary Care provider regarding elevated blood pressure.          Again, thank you for allowing me to participate in the care of your patient.        Sincerely,        Barrington Suárez DPM

## 2018-11-23 NOTE — MR AVS SNAPSHOT
After Visit Summary   11/23/2018    Ramya Hillman    MRN: [id number]           Patient Information     Date Of Birth          1945        Visit Information        Provider Department      11/23/2018 8:30 AM Barrington Rodrigues DPM [facility]        Today's Diagnoses     Bunion    -  1    Hammer toe of right foot        Callus          Care Instructions    Thank you for choosing [facility]!    Follow up as needed    DR. RODRIGUES'S CLINIC LOCATIONS     MONDAY  [place] TUESDAY & FRIDAY AM  [place]   [address] [facility]   [address]   [place] [address] [place] [address]   [phone]  [phone]  [phone]       WEDNESDAY  [place] SCHEDULE SURGERY: [phone]   [address] [facility] [place] APPOINTMENTS: [phone]   [place] [address] BILLING QUESTIONS: [phone]   [phone]         BUNION (HALLUX ABDUCTO VALGUS)  A bunion is caused by muscle imbalance. The great toe is pulled toward the smaller toes. The metatarsal head is pushed outward creating a lump on the side of your foot. Imbalance is the result of foot structure and instability.   Bunions do not improve with time. They usually enlarge, however this is a fairly slow process. Shoes do not necessarily cause bunions, however, they can hasten development and definitely cause bunions to hurt.   Bunions often run in families. We inherit a certain foot structure, which may be predisposed to bunion development.   Bunion pain is likely a combination of shoes rubbing on the bump, nerve irritation, compression between the toes, joint misalignment, arthritis and altered gait.   SYMPTOMS   Bunions are usually termed mild, moderate or severe. Just because you have a bunion does not mean you have to have pain. There are some people with very severe bunions and no pain and people with mild bunions and a lot of pain.   - Pain on the inside  of your foot at the big toe joint (1st MTPJ)   - Swelling on the inside of your foot at the big toe joint   - Redness on the inside of your foot at the big toe joint   - Numbness or burning in the big toe (hallux)   - Decreased motion at the big toe joint   - Painful bursa (fluid-filled sac) on the inside of your foot at the big toe joint   - Pain while wearing shoes -especially shoes too narrow or with high heels    - Pain during activities   - Corn in between the big toe and second toe   - Callous formation on the side or bottom of the big toe or big toe joint   - Callous under the second toe joint (2nd MTPJ)   - Pain in the second toe joint   TREATMENT  Conservative (non-surgical) treatment will not make the bunion go away, but it will hopefully decrease the signs and symptoms you have and help you get rid of the pain and get you back to your activities.   1.  Wider shoes or extra depth shoes: Most bunion pain can be improved simply by wearing compatible shoes. People with bunions cannot choose footwear simply because they like the style. Your bunion should determine which shoes are to be worn. Wide shoes with nonirritating seams,soft leather and a square toe box are most compatible with a bunion. Shoes should fit appropriately right out of the box but may need to be professionally stretched and modified to accommodate the bump. Heels, dress shoes and shoes with pointed toes will not be comfortable.   2. NSAIDs   3.  Arch supports, custom inserts, padding, splints, toe spacers : Most bunion pain can be improved simply by wearing compatible shoes. People with bunions cannot choose footwear simply because they like the style. Your bunion should determine which shoes are to be worn. Wide shoes with nonirritating seams,soft leather and a square toe box are most compatible with a bunion. Shoes should fit appropriately right out of the box but may need to be professionally stretched and modified to accommodate the  bump. Heels, dress shoes and shoes with pointed toes will not be comfortable.   4.  Change activities   5.  Physical therapy  SURGERY  Surgical treatment for bunions is sometimes needed. If you are limited by pain, cannot fit in shoes comfortably and are not able to do your daily activities then surgery may be a good option for you. There are many different surgical procedures to repair bunions. Your foot and ankle surgeon will review your foot exam findings, your x-rays, your age, your health, your lifestyle, your physical activity level and discuss with you which procedure he or she would recommend. Surgical procedures for bunions range from soft tissue repair to cutting and realigning the bones. It is not recommended that you have bunion surgery for cosmetic reasons (you do not like how your foot looks) or because you want to fit in a certain pair of shoes; There is the risk that even after surgery, the bunion will reoccur 9-10% of the time.   Bunion surgery involves cutting and repositioning the bones surrounding the bunion. Pins and screws are used to hold the bones in place during the healing process. The goal of bunion surgery is to reduce the size of the bunion bump. Realignment of the toe and joint is attempted.     Some first toes cannot be forced back into normal alignment even with surgery. Surgery is helpful in most cases but does not necessarily create a normal foot.   Healing after surgery requires about six weeks of protection. This allows the bone to heal. Maximum recovery takes about one year. The scar tissue and jOint structures require this amount of time to finish the healing process. Expect stiffness, swelling and numbness during that time frame. Bunion surgery does involve side effects. Some side effects are predictable and others are less common but do occur. A scar will be visible and could be irritated by shoes. The shoe may rub on the screw or internal pin requiring surgical removal of  these fixation devices. The screw and pin would likely be left in place for a full year. The first toe may loose motion after bunion surgery. The amount of stiffness is variable. Some people never regain normal motion of the first toe. This is due to scar tissue inherent to any surgery. The first toe may drift toward the second toe or away from the second toe. Spreading of the first and second toes is a rare occurrence after bunion surgery. This can be quite bothersome and would need to be surgically repaired. Toe drift toward the second toe could result in a recurrent bunion and revision surgery. Joint fusion is one option to correct an unstable, drifting toe. This procedure straightens the toe, however, no motion remains. Fusion may be necessary to correct complications of bunion surgery or as the original procedure in severe cases.   All surgical procedures involve risk of infection, numbness, pain, delayed healing, osteotomy dislocation, blood clots, continued foot pain, etc. Bunion surgery is quite complex and should not be taken lightly.   Any skin incision can lead to infection. Deep infection might involve the bone and thus repeat surgery and six weeks of IV antibiotics. Scar tissue can cause nerve pain or numbness. This is generally temporary but can be permanent. We do not have treatments that cure nerve problems. Second toe pain could be related to altered mechanics and pressure transferred to the second toe. Most feet with bunions have pre-existing second toe problems. Delayed bone healing would lengthen the healing time. Some bones simply do not heal. This requires repeat surgery, electronic bone stimulation and/or extended protection. Smokers have an approximate 20% chance of poor bone healing. This is double that of a non-smoker. The bone cut may displace. This may need to be repaired with a second operation. Displacement can cause jOint malalignment. Immobility after surgery can cause blood clots in  the legs and lungs. This could result in death.   Foot pain is complex. Most feet hurt for more than one reason. Fixing the bunion would not necessarily create a pain free foot. Appropriate shoes, healthy body weight, avoidance of bare foot walking and moderation of activity will always be necessary to enjoy foot comfort. Your bunion may involve arthritis, which is incurable even with surgery. Long standing bunions often involve chronic irritation to the surrounding nerves. Nerve pain may not resolve even with reducing the bunion bump since permanent nerve damage may be present   Bunion surgery is nevertheless quite successful. Most surgical patients are pleased with their foot following bunion surgery. Many of the issues described above can be controlled by taking proper care of your foot during the healing process.   Your surgeon would be happy to fully describe any of the above issues. You should pursue a full understanding of the operation,recovery process and any potential problems that could develop.   PREVENTION  1.  Do not wear high heels if there is a family history of bunions.  2.  Wear shoes that have enough width and depth in the toe box  Here are exercises that may benefit people with bunions:   Toe stretches - Stretching out your toes can help keep them limber and offset foot pain. To stretch your toes, point your toes straight ahead for 5 seconds and then curl them under for 5 seconds. Repeat these stretches 10 times. These exercises can be especially beneficial if you also have hammertoes, or chronically bent toes, in addition to a bunion.   Toe flexing and xiomara - Press your toes against a hard surface such as a wall, to flex and stretch them; hold the position for 10 seconds and repeat three to four times. Then flex your toes in the opposite direction; hold the position for 10 seconds and repeat three to four times.   Stretching your big toe - Using your fingers to gently pull your big toe  over into proper alignment can be helpful as well. Hold your toe in position for 10 seconds and repeat three to four times.   Resistance exercises - Wrap either a towel or belt around your big toe and use it to pull your big toe toward you while simultaneously pushing forward, against the towel, with your big toe.   Ball roll - To massage the bottom of your foot, sit down, place a golf ball on the floor under your foot, and roll it around under your foot for two minutes. This can help relieve foot strain and cramping.   Towel curls - You can strengthen your toes by spreading out a small towel on the floor, curling your toes around it, and pulling it toward you. Repeat five times. Gripping objects with your toes like this can help keep your foot flexible.   Picking up marbles - Another gripping exercise you can perform to keep your foot flexible is picking up marbles with your toes. Do this by placing 20 marbles on the floor in front of you and use your foot to pick the marbles up one by one and place them in a bowl.   Walking along the beach - Whenever possible, spend time walking on sand. This can give you a gentle foot massage and also help strengthen your toes. This is especially beneficial for people who have arthritis associated with their bunions.     HAMMERTOES  Hammertoe is a contracture (bending) of one or both joints of the second, third, fourth, or fifth (little) toes. This abnormal bending can put pressure on the toe when wearing shoes, causing problems to develop.  Hammertoes usually start out as mild deformities and get progressively worse over time. In the earlier stages, hammertoes are flexible and the symptoms can often be managed with noninvasive measures. But if left untreated, hammertoes can become more rigid and will not respond to non-surgical treatment.  Because of the progressive nature of hammertoes, they should receive early attention. Hammertoes never get better without some kind of  intervention.  CAUSES  The most common cause of hammertoe is a muscle/tendon imbalance. This imbalance, which leads to a bending of the toe, results from mechanical (structural) changes in the foot that occur over time in some people.  Hammertoes may be aggravated by shoes that don t fit properly. A hammertoe may result if a toe is too long and is forced into a cramped position when a tight shoe is worn.  Occasionally, hammertoe is the result of an earlier trauma to the toe. In some people, hammertoes are inherited.  SYMPTOMS  Pain or irritation of the affected toe when wearing shoes.   Corns and calluses (a buildup of skin) on the toe, between two toes, or on the ball of the foot. Corns are caused by constant friction against the shoe. They may be soft or hard, depending upon their location.   Inflammation, redness, or a burning sensation   Contracture of the toe   In more severe cases of hammertoe, open sores may form.   DIAGNOSIS  Although hammertoes are readily apparent, to arrive at a diagnosis the foot and ankle surgeon will obtain a thorough history of your symptoms and examine your foot. During the physical examination, the doctor may attempt to reproduce your symptoms by manipulating your foot and will study the contractures of the toes. In addition, the foot and ankle surgeon may take x-rays to determine the degree of the deformities and assess any changes that may have occurred.   Hammertoes are progressive - they don t go away by themselves and usually they will get worse over time. However, not all cases are alike - some hammertoes progress more rapidly than others. Once your foot and ankle surgeon has evaluated your hammertoes, a treatment plan can be developed that is suited to your needs.  NON-SURGICAL TREATMENT  There is a variety of treatment options for hammertoe. The treatment your foot and ankle surgeon selects will depend upon the severity of your hammertoe and other factors.  A number of  non-surgical measures can be undertaken:  Padding corns and calluses. Your foot and ankle surgeon can provide or prescribe pads designed to shield corns from irritation. If you want to try over-the-counter pads, avoid the medicated types. Medicated pads are generally not recommended because they may contain a small amount of acid that can be harmful. Consult your surgeon about this option.   Changes in shoewear. Avoid shoes with pointed toes, shoes that are too short, or shoes with high heels - conditions that can force your toe against the front of the shoe. Instead, choose comfortable shoes with a deep, roomy toe box and heels no higher than two inches.   Orthotic devices. A custom orthotic device placed in your shoe may help control the muscle/tendon imbalance.   Injection therapy. Corticosteroid injections are sometimes used to ease pain and inflammation caused by hammertoe.   Medications. Oral nonsteroidal anti-inflammatory drugs (NSAIDs), such as ibuprofen, may be recommended to reduce pain and inflammation.   Splinting/strapping. Splints or small straps may be applied by the surgeon to realign the bent toe.   Exercises:   1. The Toe Tap  Stand flat on the ground in your bare feet. Raise all of your toes up off the ground as high as you can. Then starting with the little toes, slowly press them down to the ground. After the big toes are on the ground, start over by raising all of them up off the ground again. This motion is similar to tapping your fingers on a desk. Repeat this ten times.     2. Interlocking your Fingers and Toes  Cross your right foot over your knee and place the fingers of your left hand between your toes. Squeeze your toes together, pinching your fingers between them. Spread the toes apart and squeeze them together again. Repeat this ten times then do the other foot. Like most exercises, this will get easier the more you do it. If you are having a lot of difficulty with this exercise, start  with just your index finger between your first and second toe, then later add your middle finger between your second and third toes, and so on until you can fit all your fingers between your toes. Do this ten times on each foot. Eventually you will be able to spread your toes apart without using your fingers.    3. Gripping the Floor   the floor by pressing the pads of your toes (not the tips) into the floor without curling your toes. Relax and repeat this ten times. If your shoes have the proper amount of depth, you should be able to do this with shoes on.    HAMMERTOE TOE SURGERY   Hammertoe surgery is complex. The surgical procedure is an attempt to help the toe lay in a better position. Nearly every structure in the toe will be cut including the tendons, ligaments, skin and bone. Hammertoes are a complex deformity and final toe position is difficult to predict. The only sure way to position a toe is to fuse all three digital joints. That will not happen as some degree of toe motion is needed for walking. The toe may not be completely reduced as the surrounding skin and other structures may not allow the toe to return to a normal position. The tendons on adjacent toes may need to be cut at the time of the original or subsequent surgeries, as interconnections exist between the toes. The toe may drift after surgery. Stiffness may develop leading to new areas of pressure.   Future shoe choices will be critical in allowing the surgery to provide comfort. The toes will still hurt if shoes rub. The original pain may also persist as other foot problems may be contributing to the current pain. The toe may or may not be pinned in place. External pins would require complete avoidance of water on the foot for six weeks. The pin would be removed about six weeks after the surgery. Strict attention to protection is critical. The pin could get bumped or loosen resulting in early removal. Removal might be necessary before  the bone heals which would negatively affect the final surgical outcome and toe allignment.       CALLUS / CORNS / IPKs  When there is excessive friction or pressure on the skin, the body responds by making the skin thicker to protect the deeper structures from becoming exposed. While this works well to protect the deeper structures, the thickened skin can increase pressure and pain.    CALLUS: Flat, diffuse thickening are simple calluses and they are usually caused by friction. Often these are the result of rubbing on a shoe or going barefoot.    CORNS: Calluses with a central core between the toes are called corns. These result from prominent joints on adjacent toes rubbing together. Theses are a symptom of bone malalignment and will always recur unless the underlying bones are addressed surgically.    IPKs: Calluses with a central core on the ball of the foot are usually IPKs (intractable plantar keratosis). These are caused by excessive pressure from the metatarsals, the bones that make up the ball of the foot. Often one of these bones is too long or too prominent.  Again, these will always recur unless the underlying bone issue is addressed. There is no cure for these. They will either go away by themselves, recur, or more could develop.    ROUTINE MAINTENANCE  1. File them down with a pumice stone or callus file a couple times a week.   2. An electric callus removing device. Amope Pedi Perfect Electronic Pedicure Foot File and Callus Remover can be a good option.   3. Lotion can be applied to soften the callus. A urea based cream such as Kersal or Vanicream or thicker cream with shea butter are good options.  4. Toe spacers or toe covers can be used for corns, gel pads can be used for other lesions on the bottom of the foot.   If there is a surgical pathology noted, such as a prominent bone, often this needs to be addressed surgically to minimize recurrence. However, sometimes the lesion simply migrates to  another spot after surgery, so it is not a guaranteed cure.     There was also discussion of the cost structure of callus care if they were to come back and have it treated in the clinic. Explained that if insurance does not cover it, they would be billed. This charge could range from $100 - $160.  They were also provided information on places to get the callus treatment.              Body Mass Index (BMI)  Many things can cause foot and ankle problems. Foot structure, activity level, foot mechanics and injuries are common causes of pain.  One very important issue that often goes unmentioned, is body weight. Extra weight can cause increased stress on muscles, ligaments, bones and tendons.  Sometimes just a few extra pounds is all it takes to put one over her/his threshold. Without reducing that stress, it can be difficult to alleviate pain. Some people are uncomfortable addressing this issue, but we feel it is important for you to think about it. As Foot &  Ankle specialists, our job is addressing the lower extremity problem and possible causes. Regarding extra body weight, we encourage patients to discuss diet and weight management plans with their primary care doctors. It is this team approach that gives you the best opportunity for pain relief and getting you back on your feet.              Follow-ups after your visit        Who to contact     If you have questions or need follow up information about today's clinic visit or your schedule please contact [facility] directly at [phone].  Normal or non-critical lab and imaging results will be communicated to you by MyChart, letter or phone within 4 business days after the clinic has received the results. If you do not hear from us within 7 days, please contact the clinic through MyChart or phone. If you have a critical or abnormal lab result, we will notify you by phone as soon as possible.  Submit refill requests through [employer] or call your  "pharmacy and they will forward the refill request to us. Please allow 3 business days for your refill to be completed.          Additional Information About Your Visit        MyChart Information     [employer] gives you secure access to your electronic health record. If you see a primary care provider, you can also send messages to your care team and make appointments. If you have questions, please call your primary care clinic.  If you do not have a primary care provider, please call [phone] and they will assist you.        Care EveryWhere ID     This is your Care EveryWhere ID. This could be used by other organizations to access your [facility] medical records  [id number]        Your Vitals Were     Height BMI (Body Mass Index)                5' 10\" (1.778 m) 24.11 kg/m2           Blood Pressure from Last 3 Encounters:   11/23/18 (!) 163/96   11/21/18 122/72   01/02/18 154/83    Weight from Last 3 Encounters:   11/23/18 168 lb (76.2 kg)   11/21/18 168 lb 6.4 oz (76.4 kg)   01/02/18 166 lb (75.3 kg)              Today, you had the following     No orders found for display       Primary Care Provider Office Phone # Fax #    Madan Lizama [phone] [address] [address]        Equal Access to Services     [facility]: Hadii aad ku hadasho Soomaali, waaxda luqadaha, qaybta kaalmada adeegyada, ayla soas . So [facility] [phone].    ATENCIÓN: Si habla español, tiene a curran disposición servicios gratuitos de asistencia lingüística. Giuseppe al [phone].    We comply with applicable federal civil rights laws and [place] laws. We do not discriminate on the basis of race, color, national origin, age, disability, sex, sexual orientation, or gender identity.            Thank you!     Thank you for choosing [facility]  for your care. Our goal is always to provide you with excellent care. Hearing back from our patients is one way " we can continue to improve our services. Please take a few minutes to complete the written survey that you may receive in the mail after your visit with us. Thank you!             Your Updated Medication List - Protect others around you: Learn how to safely use, store and throw away your medicines at www.disposemymeds.org.          This list is accurate as of 11/23/18  8:44 AM.  Always use your most recent med list.                   Brand Name Dispense Instructions for use Diagnosis    aspirin 81 MG tablet    ASA    1 tablet    Take by mouth daily        Co Q10 100 MG Caps      Take by mouth daily        Fish Oil 1000 MG Cpdr      Take by mouth daily        gabapentin 100 MG capsule    NEURONTIN    90 capsule    Take 1 capsule (100 mg) by mouth 3 times daily    Painful tongue       Multi-vitamin Tabs tablet      Take 1 tablet by mouth daily        simvastatin 20 MG tablet    ZOCOR    90 tablet    Take 1 tablet (20 mg) by mouth At Bedtime    Hypercholesterolemia       vitamin D 1000 units capsule      Take 1 capsule by mouth daily

## 2018-11-23 NOTE — PROGRESS NOTES
PATIENT HISTORY:  Ramya Hillman is a 73 year old female who presents to clinic for R foot bunion pain.  Also 2nd hammertoe which rubs in shoes.  No injury.  2-7/10 pain.  Worse with activity, better with rest.  She is considering surgery.  Present for years.          Review of Systems:  Patient denies fever, chills, rash, wound, stiffness, limping, numbness, weakness, heart burn, blood in stool, chest pain with activity, calf pain when walking, shortness of breath with activity, chronic cough, easy bleeding/bruising, swelling of ankles, excessive thirst, fatigue, depression, anxiety.       PAST MEDICAL HISTORY:   Past Medical History:   Diagnosis Date     Arthritis 1/01/2000    Hands & feet     Chest pain      Condyloma     had for5 years, 20 years ago,recurrence 2012 biopsy taken     Family history of heart disease      Hyperlipidemia      PVD (peripheral vascular disease) (H)      Tobacco abuse         PAST SURGICAL HISTORY:   Past Surgical History:   Procedure Laterality Date     BIOPSY  3/1/2018    Mole on back     COLONOSCOPY  10/18/2018    1 polyp - all clear     NO HISTORY OF SURGERY       ORTHOPEDIC SURGERY  1/1/2012    Rotator Cuff - Shoulder        MEDICATIONS:   Current Outpatient Prescriptions:      aspirin 81 MG tablet, Take by mouth daily, Disp: 1 tablet, Rfl:      Cholecalciferol (VITAMIN D) 1000 UNITS capsule, Take 1 capsule by mouth daily, Disp: , Rfl:      Coenzyme Q10 (CO Q10) 100 MG CAPS, Take by mouth daily, Disp: , Rfl:      multivitamin, therapeutic with minerals (MULTI-VITAMIN) TABS, Take 1 tablet by mouth daily, Disp: , Rfl:      Omega-3 Fatty Acids (FISH OIL) 1000 MG CPDR, Take by mouth daily, Disp: , Rfl:      simvastatin (ZOCOR) 20 MG tablet, Take 1 tablet (20 mg) by mouth At Bedtime, Disp: 90 tablet, Rfl: 4     gabapentin (NEURONTIN) 100 MG capsule, Take 1 capsule (100 mg) by mouth 3 times daily (Patient not taking: Reported on 11/21/2018), Disp: 90 capsule, Rfl: 1     ALLERGIES:   "  Allergies   Allergen Reactions     No Known Allergies         SOCIAL HISTORY:   Social History     Social History     Marital status:      Spouse name: N/A     Number of children: 0     Years of education: N/A     Occupational History     Sales      Social History Main Topics     Smoking status: Current Some Day Smoker     Years: 30.00     Types: Cigarettes     Start date: 1990     Last attempt to quit: 2018     Smokeless tobacco: Never Used      Comment:  2-3 cigs per day off and on     Alcohol use 0.0 oz/week     0 Standard drinks or equivalent per week     Drug use: No     Sexual activity: Not Currently     Birth control/ protection: Post-menopausal      Comment: postmenopausal     Other Topics Concern     Caffeine Concern No     1 a day. mostly decaf     Exercise No     likes to walk     Parent/Sibling W/ Cabg, Mi Or Angioplasty Before 65f 55m? Yes     Social History Narrative        FAMILY HISTORY:   Family History   Problem Relation Age of Onset     Cerebrovascular Disease Mother      Coronary Artery Disease Mother           Osteoporosis Mother      Hyperlipidemia Mother      Coronary Artery Disease Father 81          Hyperlipidemia Father      Coronary Artery Disease Brother           Hyperlipidemia Brother      Coronary Artery Disease Sister 52          Hyperlipidemia Sister      Coronary Artery Disease Brother 62     Hyperlipidemia Brother      Coronary Artery Disease Sister      Hyperlipidemia Sister      Coronary Artery Disease Sister      Hyperlipidemia Sister      Breast Cancer Sister 80          Breast Cancer Niece      Coronary Artery Disease Sister      Coronary Artery Disease Sister         EXAM:Vitals: BP (!) 163/96  Ht 1.778 m (5' 10\")  Wt 76.2 kg (168 lb)  BMI 24.11 kg/m2  BMI= Body mass index is 24.11 kg/(m^2).    General appearance: Patient is alert and fully cooperative with history & exam.  No sign of distress is noted during the " visit.     Psychiatric: Affect is pleasant & appropriate.  Patient appears motivated to improve health.     Respiratory: Breathing is regular & unlabored while sitting.     HEENT: Hearing is intact to spoken word.  Speech is clear.  No gross evidence of visual impairment that would impact ambulation.     Dermatologic: Callus to medial R 2nd toe. No paronychia or evidence of soft tissue infection is noted.     Vascular: DP & PT pulses are intact & regular on the R.  No significant edema or varicosities noted.  CFT and skin temperature are normal to both lower extremities.     Neurologic: Lower extremity sensation is intact to light touch.  No evidence of weakness or contracture in the lower extremities.  No evidence of neuropathy.     Musculoskeletal: Hallux abducto valgus is noted with R foot exam. Lateral deviation of the first toe is appreciated. A bump is noted on the medial aspect of the first metatarsal head. Evidence of soft tissue irritation is noted over the dorsal medial aspect of the first metatarsal head. No significant sesamoid pain is noted plantarly. I do not appreciate significant pain with joint motion. R 2nd hammertoe noted.  Patient is ambulatory without assistive device or brace.  No gross ankle deformity noted.  No foot or ankle joint effusion is noted.     ASSESSMENT:   R foot bunion, hammertoe     PLAN:  Reviewed patient's chart in epic.  Discussed condition and treatment options including pros and cons.    We discussed treatment alternatives regarding bunion pain and deformity.  I explained that bunion surgery is mostly to reduce the size of the medial bump although an attempt is made to improve the first toe alignment.  Bunion deformity is usually progressive and further drifting of the toe may occur even with surgery.      Non-operative treatments were discussed including wide fitting shoes, splints, pads and orthotics.  Wide fitting shoes are likely the most useful non-surgical treatment.   I would not expect much improvement from NSAIDs, injection or bunion night splints.     Hammertoe treatment options were discussed.  This included both surgical and non-surgical treatment alternatives.  Non-surgical options include wide fitting shoes, deep toe box, crest pad or foam pads, foot orthotics and debridement of any callous as necessary.  Surgical treatments were explained.    Patient is aware that surgery is elective, can be avoided if desired.  The recovery process was discussed including impact to work, walking, shoes and daily activities.  I would anticipate up to 12 months for maximum recovery after surgery.     Pt with hx of smoking, PAD.  Exam w/o obvious deficit, but consider noninvasive vascular exam prior to any surgery.    Pt considering her options for now.  F/u prn.    Barrington Suárez DPM, FACFAS    Patient to follow up with Primary Care provider regarding elevated blood pressure.

## 2018-11-23 NOTE — PATIENT INSTRUCTIONS
Thank you for choosing [facility]!    Follow up as needed    DR. R[facility]RIGUES[facility]S CLIN[facility] LOCATIONS     MONDAY  [place] TUESDAY & FRIDAY AM  [place]   [address]   [address]   [place] [address] [place] [address]   [phone]  [phone]  [phone]       [facility]  [facility] [phone]   [address] [facility] APPOINTMENTS: [phone]   [place] [address] BILLING QUESTIONS: [phone]   [phone]         BUNION (HALLUX ABDUCTO VALGUS)  A bunion is caused by muscle imbalance. The great toe is pulled toward the smaller toes. The metatarsal head is pushed outward creating a lump on the side of your foot. Imbalance is the result of foot structure and instability.   Bunions do not improve with time. They usually enlarge, however this is a fairly slow process. Shoes do not necessarily cause bunions, however, they can hasten development and definitely cause bunions to hurt.   Bunions often run in families. We inherit a certain foot structure, which may be predisposed to bunion development.   Bunion pain is likely a combination of shoes rubbing on the bump, nerve irritation, compression between the toes, joint misalignment, arthritis and altered gait.   SYMPTOMS   Bunions are usually termed mild, moderate or severe. Just because you have a bunion does not mean you have to have pain. There are some people with very severe bunions and no pain and people with mild bunions and a lot of pain.   - Pain on the inside of your foot at the big toe joint (1st MTPJ)   - Swelling on the inside of your foot at the big toe joint   - Redness on the inside of your foot at the big toe joint   - Numbness or burning in the big toe (hallux)   - Decreased motion at the big toe joint   - Painful bursa (fluid-filled sac) on the inside of your foot at the big toe joint   - Pain while wearing shoes -especially shoes too  narrow or with high heels    - Pain during activities   - Corn in between the big toe and second toe   - Callous formation on the side or bottom of the big toe or big toe joint   - Callous under the second toe joint (2nd MTPJ)   - Pain in the second toe joint   TREATMENT  Conservative (non-surgical) treatment will not make the bunion go away, but it will hopefully decrease the signs and symptoms you have and help you get rid of the pain and get you back to your activities.   1.  Wider shoes or extra depth shoes: Most bunion pain can be improved simply by wearing compatible shoes. People with bunions cannot choose footwear simply because they like the style. Your bunion should determine which shoes are to be worn. Wide shoes with nonirritating seams,soft leather and a square toe box are most compatible with a bunion. Shoes should fit appropriately right out of the box but may need to be professionally stretched and modified to accommodate the bump. Heels, dress shoes and shoes with pointed toes will not be comfortable.   2. NSAIDs   3.  Arch supports, custom inserts, padding, splints, toe spacers : Most bunion pain can be improved simply by wearing compatible shoes. People with bunions cannot choose footwear simply because they like the style. Your bunion should determine which shoes are to be worn. Wide shoes with nonirritating seams,soft leather and a square toe box are most compatible with a bunion. Shoes should fit appropriately right out of the box but may need to be professionally stretched and modified to accommodate the bump. Heels, dress shoes and shoes with pointed toes will not be comfortable.   4.  Change activities   5.  Physical therapy  SURGERY  Surgical treatment for bunions is sometimes needed. If you are limited by pain, cannot fit in shoes comfortably and are not able to do your daily activities then surgery may be a good option for you. There are many different surgical procedures to repair  bunions. Your foot and ankle surgeon will review your foot exam findings, your x-rays, your age, your health, your lifestyle, your physical activity level and discuss with you which procedure he or she would recommend. Surgical procedures for bunions range from soft tissue repair to cutting and realigning the bones. It is not recommended that you have bunion surgery for cosmetic reasons (you do not like how your foot looks) or because you want to fit in a certain pair of shoes; There is the risk that even after surgery, the bunion will reoccur 9-10% of the time.   Bunion surgery involves cutting and repositioning the bones surrounding the bunion. Pins and screws are used to hold the bones in place during the healing process. The goal of bunion surgery is to reduce the size of the bunion bump. Realignment of the toe and joint is attempted.     Some first toes cannot be forced back into normal alignment even with surgery. Surgery is helpful in most cases but does not necessarily create a normal foot.   Healing after surgery requires about six weeks of protection. This allows the bone to heal. Maximum recovery takes about one year. The scar tissue and jOint structures require this amount of time to finish the healing process. Expect stiffness, swelling and numbness during that time frame. Bunion surgery does involve side effects. Some side effects are predictable and others are less common but do occur. A scar will be visible and could be irritated by shoes. The shoe may rub on the screw or internal pin requiring surgical removal of these fixation devices. The screw and pin would likely be left in place for a full year. The first toe may loose motion after bunion surgery. The amount of stiffness is variable. Some people never regain normal motion of the first toe. This is due to scar tissue inherent to any surgery. The first toe may drift toward the second toe or away from the second toe. Spreading of the first and  second toes is a rare occurrence after bunion surgery. This can be quite bothersome and would need to be surgically repaired. Toe drift toward the second toe could result in a recurrent bunion and revision surgery. Joint fusion is one option to correct an unstable, drifting toe. This procedure straightens the toe, however, no motion remains. Fusion may be necessary to correct complications of bunion surgery or as the original procedure in severe cases.   All surgical procedures involve risk of infection, numbness, pain, delayed healing, osteotomy dislocation, blood clots, continued foot pain, etc. Bunion surgery is quite complex and should not be taken lightly.   Any skin incision can lead to infection. Deep infection might involve the bone and thus repeat surgery and six weeks of IV antibiotics. Scar tissue can cause nerve pain or numbness. This is generally temporary but can be permanent. We do not have treatments that cure nerve problems. Second toe pain could be related to altered mechanics and pressure transferred to the second toe. Most feet with bunions have pre-existing second toe problems. Delayed bone healing would lengthen the healing time. Some bones simply do not heal. This requires repeat surgery, electronic bone stimulation and/or extended protection. Smokers have an approximate 20% chance of poor bone healing. This is double that of a non-smoker. The bone cut may displace. This may need to be repaired with a second operation. Displacement can cause jOint malalignment. Immobility after surgery can cause blood clots in the legs and lungs. This could result in death.   Foot pain is complex. Most feet hurt for more than one reason. Fixing the bunion would not necessarily create a pain free foot. Appropriate shoes, healthy body weight, avoidance of bare foot walking and moderation of activity will always be necessary to enjoy foot comfort. Your bunion may involve arthritis, which is incurable even with  surgery. Long standing bunions often involve chronic irritation to the surrounding nerves. Nerve pain may not resolve even with reducing the bunion bump since permanent nerve damage may be present   Bunion surgery is nevertheless quite successful. Most surgical patients are pleased with their foot following bunion surgery. Many of the issues described above can be controlled by taking proper care of your foot during the healing process.   Your surgeon would be happy to fully describe any of the above issues. You should pursue a full understanding of the operation,recovery process and any potential problems that could develop.   PREVENTION  1.  Do not wear high heels if there is a family history of bunions.  2.  Wear shoes that have enough width and depth in the toe box  Here are exercises that may benefit people with bunions:   Toe stretches - Stretching out your toes can help keep them limber and offset foot pain. To stretch your toes, point your toes straight ahead for 5 seconds and then curl them under for 5 seconds. Repeat these stretches 10 times. These exercises can be especially beneficial if you also have hammertoes, or chronically bent toes, in addition to a bunion.   Toe flexing and xiomara - Press your toes against a hard surface such as a wall, to flex and stretch them; hold the position for 10 seconds and repeat three to four times. Then flex your toes in the opposite direction; hold the position for 10 seconds and repeat three to four times.   Stretching your big toe - Using your fingers to gently pull your big toe over into proper alignment can be helpful as well. Hold your toe in position for 10 seconds and repeat three to four times.   Resistance exercises - Wrap either a towel or belt around your big toe and use it to pull your big toe toward you while simultaneously pushing forward, against the towel, with your big toe.   Ball roll - To massage the bottom of your foot, sit down, place a golf  ball on the floor under your foot, and roll it around under your foot for two minutes. This can help relieve foot strain and cramping.   Towel curls - You can strengthen your toes by spreading out a small towel on the floor, curling your toes around it, and pulling it toward you. Repeat five times. Gripping objects with your toes like this can help keep your foot flexible.   Picking up marbles - Another gripping exercise you can perform to keep your foot flexible is picking up marbles with your toes. Do this by placing 20 marbles on the floor in front of you and use your foot to pick the marbles up one by one and place them in a bowl.   Walking along the beach - Whenever possible, spend time walking on sand. This can give you a gentle foot massage and also help strengthen your toes. This is especially beneficial for people who have arthritis associated with their bunions.     HAMMERTOES  Hammertoe is a contracture (bending) of one or both joints of the second, third, fourth, or fifth (little) toes. This abnormal bending can put pressure on the toe when wearing shoes, causing problems to develop.  Hammertoes usually start out as mild deformities and get progressively worse over time. In the earlier stages, hammertoes are flexible and the symptoms can often be managed with noninvasive measures. But if left untreated, hammertoes can become more rigid and will not respond to non-surgical treatment.  Because of the progressive nature of hammertoes, they should receive early attention. Hammertoes never get better without some kind of intervention.  CAUSES  The most common cause of hammertoe is a muscle/tendon imbalance. This imbalance, which leads to a bending of the toe, results from mechanical (structural) changes in the foot that occur over time in some people.  Hammertoes may be aggravated by shoes that don t fit properly. A hammertoe may result if a toe is too long and is forced into a cramped position when a tight  shoe is worn.  Occasionally, hammertoe is the result of an earlier trauma to the toe. In some people, hammertoes are inherited.  SYMPTOMS  Pain or irritation of the affected toe when wearing shoes.   Corns and calluses (a buildup of skin) on the toe, between two toes, or on the ball of the foot. Corns are caused by constant friction against the shoe. They may be soft or hard, depending upon their location.   Inflammation, redness, or a burning sensation   Contracture of the toe   In more severe cases of hammertoe, open sores may form.   DIAGNOSIS  Although hammertoes are readily apparent, to arrive at a diagnosis the foot and ankle surgeon will obtain a thorough history of your symptoms and examine your foot. During the physical examination, the doctor may attempt to reproduce your symptoms by manipulating your foot and will study the contractures of the toes. In addition, the foot and ankle surgeon may take x-rays to determine the degree of the deformities and assess any changes that may have occurred.   Hammertoes are progressive - they don t go away by themselves and usually they will get worse over time. However, not all cases are alike - some hammertoes progress more rapidly than others. Once your foot and ankle surgeon has evaluated your hammertoes, a treatment plan can be developed that is suited to your needs.  NON-SURGICAL TREATMENT  There is a variety of treatment options for hammertoe. The treatment your foot and ankle surgeon selects will depend upon the severity of your hammertoe and other factors.  A number of non-surgical measures can be undertaken:  Padding corns and calluses. Your foot and ankle surgeon can provide or prescribe pads designed to shield corns from irritation. If you want to try over-the-counter pads, avoid the medicated types. Medicated pads are generally not recommended because they may contain a small amount of acid that can be harmful. Consult your surgeon about this option.    Changes in shoewear. Avoid shoes with pointed toes, shoes that are too short, or shoes with high heels - conditions that can force your toe against the front of the shoe. Instead, choose comfortable shoes with a deep, roomy toe box and heels no higher than two inches.   Orthotic devices. A custom orthotic device placed in your shoe may help control the muscle/tendon imbalance.   Injection therapy. Corticosteroid injections are sometimes used to ease pain and inflammation caused by hammertoe.   Medications. Oral nonsteroidal anti-inflammatory drugs (NSAIDs), such as ibuprofen, may be recommended to reduce pain and inflammation.   Splinting/strapping. Splints or small straps may be applied by the surgeon to realign the bent toe.   Exercises:   1. The Toe Tap  Stand flat on the ground in your bare feet. Raise all of your toes up off the ground as high as you can. Then starting with the little toes, slowly press them down to the ground. After the big toes are on the ground, start over by raising all of them up off the ground again. This motion is similar to tapping your fingers on a desk. Repeat this ten times.     2. Interlocking your Fingers and Toes  Cross your right foot over your knee and place the fingers of your left hand between your toes. Squeeze your toes together, pinching your fingers between them. Spread the toes apart and squeeze them together again. Repeat this ten times then do the other foot. Like most exercises, this will get easier the more you do it. If you are having a lot of difficulty with this exercise, start with just your index finger between your first and second toe, then later add your middle finger between your second and third toes, and so on until you can fit all your fingers between your toes. Do this ten times on each foot. Eventually you will be able to spread your toes apart without using your fingers.    3. Gripping the Floor   the floor by pressing the pads of your toes (not  the tips) into the floor without curling your toes. Relax and repeat this ten times. If your shoes have the proper amount of depth, you should be able to do this with shoes on.    HAMMERTOE TOE SURGERY   Hammertoe surgery is complex. The surgical procedure is an attempt to help the toe lay in a better position. Nearly every structure in the toe will be cut including the tendons, ligaments, skin and bone. Hammertoes are a complex deformity and final toe position is difficult to predict. The only sure way to position a toe is to fuse all three digital joints. That will not happen as some degree of toe motion is needed for walking. The toe may not be completely reduced as the surrounding skin and other structures may not allow the toe to return to a normal position. The tendons on adjacent toes may need to be cut at the time of the original or subsequent surgeries, as interconnections exist between the toes. The toe may drift after surgery. Stiffness may develop leading to new areas of pressure.   Future shoe choices will be critical in allowing the surgery to provide comfort. The toes will still hurt if shoes rub. The original pain may also persist as other foot problems may be contributing to the current pain. The toe may or may not be pinned in place. External pins would require complete avoidance of water on the foot for six weeks. The pin would be removed about six weeks after the surgery. Strict attention to protection is critical. The pin could get bumped or loosen resulting in early removal. Removal might be necessary before the bone heals which would negatively affect the final surgical outcome and toe allignment.       CALLUS / CORNS / IPKs  When there is excessive friction or pressure on the skin, the body responds by making the skin thicker to protect the deeper structures from becoming exposed. While this works well to protect the deeper structures, the thickened skin can increase pressure and  pain.    CALLUS: Flat, diffuse thickening are simple calluses and they are usually caused by friction. Often these are the result of rubbing on a shoe or going barefoot.    CORNS: Calluses with a central core between the toes are called corns. These result from prominent joints on adjacent toes rubbing together. Theses are a symptom of bone malalignment and will always recur unless the underlying bones are addressed surgically.    IPKs: Calluses with a central core on the ball of the foot are usually IPKs (intractable plantar keratosis). These are caused by excessive pressure from the metatarsals, the bones that make up the ball of the foot. Often one of these bones is too long or too prominent.  Again, these will always recur unless the underlying bone issue is addressed. There is no cure for these. They will either go away by themselves, recur, or more could develop.    ROUTINE MAINTENANCE  1. File them down with a pumice stone or callus file a couple times a week.   2. An electric callus removing device. Amope Pedi Perfect Electronic Pedicure Foot File and Callus Remover can be a good option.   3. Lotion can be applied to soften the callus. A urea based cream such as Kersal or Vanicream or thicker cream with shea butter are good options.  4. Toe spacers or toe covers can be used for corns, gel pads can be used for other lesions on the bottom of the foot.   If there is a surgical pathology noted, such as a prominent bone, often this needs to be addressed surgically to minimize recurrence. However, sometimes the lesion simply migrates to another spot after surgery, so it is not a guaranteed cure.     There was also discussion of the cost structure of callus care if they were to come back and have it treated in the clinic. Explained that if insurance does not cover it, they would be billed. This charge could range from $100 - $160.  They were also provided information on places to get the callus  treatment.              Body Mass Index (BMI)  Many things can cause foot and ankle problems. Foot structure, activity level, foot mechanics and injuries are common causes of pain.  One very important issue that often goes unmentioned, is body weight. Extra weight can cause increased stress on muscles, ligaments, bones and tendons.  Sometimes just a few extra pounds is all it takes to put one over her/his threshold. Without reducing that stress, it can be difficult to alleviate pain. Some people are uncomfortable addressing this issue, but we feel it is important for you to think about it. As Foot &  Ankle specialists, our job is addressing the lower extremity problem and possible causes. Regarding extra body weight, we encourage patients to discuss diet and weight management plans with their primary care doctors. It is this team approach that gives you the best opportunity for pain relief and getting you back on your feet.

## 2018-12-05 ENCOUNTER — TRANSFERRED RECORDS (OUTPATIENT)
Dept: HEALTH INFORMATION MANAGEMENT | Facility: CLINIC | Age: 73
End: 2018-12-05

## 2019-06-20 ENCOUNTER — TRANSFERRED RECORDS (OUTPATIENT)
Dept: HEALTH INFORMATION MANAGEMENT | Facility: CLINIC | Age: 74
End: 2019-06-20

## 2019-08-06 ENCOUNTER — TRANSFERRED RECORDS (OUTPATIENT)
Dept: HEALTH INFORMATION MANAGEMENT | Facility: CLINIC | Age: 74
End: 2019-08-06

## 2019-08-07 ENCOUNTER — TELEPHONE (OUTPATIENT)
Dept: OTHER | Facility: CLINIC | Age: 74
End: 2019-08-07

## 2019-08-07 DIAGNOSIS — R09.89 OTHER SPECIFIED SYMPTOMS AND SIGNS INVOLVING THE CIRCULATORY AND RESPIRATORY SYSTEMS: ICD-10-CM

## 2019-08-07 DIAGNOSIS — M79.661 PAIN OF RIGHT LOWER LEG: Primary | ICD-10-CM

## 2019-08-07 NOTE — TELEPHONE ENCOUNTER
Pt referred to [facility] by Dr. Sanchez for arterial assessment prior to foot surgery.    Pt reports right leg claudication symptoms that resolved with rest.  Denies rest pain, numbness, swelling.    RN contacted [facility] to request referring provider notes.  LVM requesting them to fax to [facility].    Pt needs to be scheduled for ZOILA with exercise and consult with Vascular Surgery.  Will route to scheduling to coordinate an appointment at next available.    MIKI Foss RN

## 2019-08-13 ENCOUNTER — HOSPITAL ENCOUNTER (OUTPATIENT)
Dept: ULTRASOUND IMAGING | Facility: CLINIC | Age: 74
Discharge: HOME | End: 2019-08-13
Attending: SURGERY | Admitting: SURGERY
Payer: COMMERCIAL

## 2019-08-13 ENCOUNTER — TRANSFERRED RECORDS (OUTPATIENT)
Dept: HEALTH INFORMATION MANAGEMENT | Facility: CLINIC | Age: 74
End: 2019-08-13

## 2019-08-13 ENCOUNTER — OFFICE VISIT (OUTPATIENT)
Dept: OTHER | Facility: CLINIC | Age: 74
End: 2019-08-13
Attending: SURGERY
Payer: COMMERCIAL

## 2019-08-13 VITALS — HEART RATE: 84 BPM | SYSTOLIC BLOOD PRESSURE: 135 MMHG | DIASTOLIC BLOOD PRESSURE: 72 MMHG

## 2019-08-13 DIAGNOSIS — M79.661 PAIN OF RIGHT LOWER LEG: Primary | ICD-10-CM

## 2019-08-13 DIAGNOSIS — I73.9 CLAUDICATION (H): ICD-10-CM

## 2019-08-13 DIAGNOSIS — M79.661 PAIN OF RIGHT LOWER LEG: ICD-10-CM

## 2019-08-13 DIAGNOSIS — R09.89 OTHER SPECIFIED SYMPTOMS AND SIGNS INVOLVING THE CIRCULATORY AND RESPIRATORY SYSTEMS: ICD-10-CM

## 2019-08-13 PROCEDURE — 99203 OFFICE O/P NEW LOW 30 MIN: CPT | Mod: ZP | Performed by: SURGERY

## 2019-08-13 PROCEDURE — 93924 LWR XTR VASC STDY BILAT: CPT

## 2019-08-13 PROCEDURE — G0463 HOSPITAL OUTPT CLINIC VISIT: HCPCS

## 2019-08-13 NOTE — NURSING NOTE
"Ramya Hillman is a 74 year old female who presents for:  Chief Complaint   Patient presents with     Consult     ZOILA w/ ex (9:30 [facility]; 10:45 [facility]) History of right leg pain with walking; coordinate imaging and consult with Vascular Surgery        Vitals:    Vitals:    08/13/19 1040 08/13/19 1041   BP: (!) 147/81 135/72   BP Location: Right arm Left arm   Patient Position: Chair Chair   Cuff Size: Adult Regular Adult Regular   Pulse: 81 84       BMI:  Estimated body mass index is 24.11 kg/m  as calculated from the following:    Height as of 11/23/18: 5' 10\" (1.778 m).    Weight as of 11/23/18: 168 lb (76.2 kg).    Pain Score:  Data Unavailable        Cathie Hoffmann MA    "

## 2019-08-13 NOTE — LETTER
[facility] [address]  [place] [address]  Phone: [phone]  Fax: [phone]      2019       Re: Ramya Hillman - 1945    PRIMARY CARE PROVIDER: Madan Lizama      Reason for visit: Right leg cramping pain     IMPRESSION:  75 yo female with right leg claudication at 4 minutes of walking or 2 blocks.      RECOMMENDATION:  I discussed with Ramya that her claudication is not yet life-style limiting. She stops and rests and the symptoms improve and she is able to walk the same distance. Her digit pressures are normal, > 60 mmHg and suggest she should heal any wound or surgical wound.  She is planning on having a Bunion surgery soon and based on the digit pressures healing shouldn't be an issue.  She is not a diabetic so digital arteries should not be calcified. We will see her back in 6 months to evaluate her progress with a walking program and her claudication.  I recommend ASA 81 mg po daily, Statin medication LDL < 100 mg/dL target, and smoking cessation.     HPI:  Ramya Hillman is a 74 year old female who was seen today in consultation regarding bilateral lower leg claudication.  She is a smoker and we discussed that she needs to quit smoking all together.  It sounds like she currently only uses tobacco occasionally.  The patient can walk about 2 blocks/4 minutes and then she gets cramping calf pain that resolves with short rest. This distance is very repeatable.  She denies chest pain and no stroke or TIA symptoms. Currently, she does not feel her lifestyle is severely limited by her symptoms.  She is planning on having bunion surgery on the right foot soon.  Her ZOILA's are 0.66 on the right and 0.89 on the left, with digit pressures > 60 mmHg bilaterally.  The patient is also denying rest pain and no tissue loss noted.       REVIEW OF SYSTEMS:    A 12 point ROS was reviewed and except for what is listed in the HPI above, all others are  negative     PE:  /72 (BP Location: Left arm, Patient Position: Chair, Cuff Size: Adult Regular)   Pulse 84   Breastfeeding? No       Wt Readings from Last 1 Encounters:   08/19/19 164 lb 3.2 oz (74.5 kg)      There is no height or weight on file to calculate BMI.     EXAM:  GENERAL: This is a well-developed 74 year old female who appears her stated age  EYES: Grossly normal.  MOUTH: Buccal mucosa normal   CARDIAC:  NS1 S2, No Murmur  CHEST/LUNG:  Clear lung fields bilaterally   GASTROINTESINAL (ABDOMEN): Soft, non-tender, B/S present, no pulsatile mass  MUSCULOSKELETAL: Grossly normal and both lower extremities are intact.  HEME/LYMPH: No lymphedema  NEUROLOGIC: Focally intact, Alert and oriented x 3. Sensation intact in both feet.   PSYCH: appropriate affect  INTEGUMENT: No open lesions or ulcers.  No significant dependent rubor.    Pulse Exam:      Radial: Left 2              Right  2     DP:      Left 1              Right  1     PT:       Left 1              Right  1      DIAGNOSTIC STUDIES:      Images:  Us Zoila Doppler With Exercise Bilateral     Result Date: 8/13/2019  ULTRASOUND ZOILA DOPPLER WITH EXERCISE BILATERAL   8/13/2019 10:00 AM HISTORY: History of right leg pain with walking. Other specified symptoms and signs involving the circulatory and respiratory systems. Pain of right lower leg. COMPARISON: None. FINDINGS: Right ZOILA: 0.66. Left ZOILA: 0.98. Right digital brachial index: 0.66, right digital pressure: 117. Left digital brachial index: 0.89, left digital pressure: 158. Waveforms: Triphasic in the right femoral, monophasic in the right popliteal and tibial vessels. Triphasic throughout the left. Exercise: Patient exercised on a treadmill for 5 minutes at 1.9 miles per hour and a 10% incline. At 3 minutes and 50 seconds the patient's right ankle became tired with aching in the right calf. At 4 minutes and 40 seconds worsening calf cramping. Right exercise ZOILA: 0.25. Left exercise ZOILA: 0.72.       IMPRESSION: 1. Right resting ZOILA shows moderate arterial insufficiency which becomes severe with exercise. 2. Left resting ZOILA is normal with moderate exercise-induced arterial insufficiency. ZOILA CRITERIA: >0.95 Normal 0.90 - 0.94 Mild 0.5 - 0.89 Moderate 0.2 - 0.49 Severe <0.2 Critical JULIUS MOSES DO      I personally reviewed the images and my interpretation is ZOILA is abnormal , but toe pressures are > 60 mmHg bilaterally.      LABS:            Sodium   Date Value Ref Range Status   08/19/2019 142 133 - 144 mmol/L Final   11/21/2018 141 133 - 144 mmol/L Final   11/02/2017 142 133 - 144 mmol/L Final            Urea Nitrogen   Date Value Ref Range Status   08/19/2019 15 7 - 30 mg/dL Final   11/21/2018 20 7 - 30 mg/dL Final   11/02/2017 21 7 - 30 mg/dL Final            Hemoglobin   Date Value Ref Range Status   08/19/2019 14.4 11.7 - 15.7 g/dL Final   11/21/2018 13.5 11.7 - 15.7 g/dL Final   11/02/2017 14.2 11.7 - 15.7 g/dL Final            Platelet Count   Date Value Ref Range Status   08/19/2019 280 150 - 450 10e9/L Final   11/21/2018 265 150 - 450 10e9/L Final   11/02/2017 267 150 - 450 10e9/L Final         Fuad Salinas MD  VASCULAR SURGERY

## 2019-08-19 ENCOUNTER — OFFICE VISIT (OUTPATIENT)
Dept: FAMILY MEDICINE | Facility: CLINIC | Age: 74
End: 2019-08-19
Payer: COMMERCIAL

## 2019-08-19 VITALS
TEMPERATURE: 96.7 F | WEIGHT: 164.2 LBS | SYSTOLIC BLOOD PRESSURE: 141 MMHG | HEIGHT: 70 IN | BODY MASS INDEX: 23.51 KG/M2 | HEART RATE: 76 BPM | OXYGEN SATURATION: 100 % | DIASTOLIC BLOOD PRESSURE: 79 MMHG

## 2019-08-19 DIAGNOSIS — M21.611 BUNION, RIGHT: ICD-10-CM

## 2019-08-19 DIAGNOSIS — I73.9 PAD (PERIPHERAL ARTERY DISEASE) (H): ICD-10-CM

## 2019-08-19 DIAGNOSIS — Z01.818 PREOP GENERAL PHYSICAL EXAM: Primary | ICD-10-CM

## 2019-08-19 LAB
ANION GAP SERPL CALCULATED.3IONS-SCNC: 5 MMOL/L (ref 3–14)
BASOPHILS # BLD AUTO: 0 10E9/L (ref 0–0.2)
BASOPHILS NFR BLD AUTO: 0.4 %
BUN SERPL-MCNC: 15 MG/DL (ref 7–30)
CALCIUM SERPL-MCNC: 9.1 MG/DL (ref 8.5–10.1)
CHLORIDE SERPL-SCNC: 110 MMOL/L (ref 94–109)
CO2 SERPL-SCNC: 27 MMOL/L (ref 20–32)
CREAT SERPL-MCNC: 0.82 MG/DL (ref 0.52–1.04)
DIFFERENTIAL METHOD BLD: NORMAL
EOSINOPHIL # BLD AUTO: 0.5 10E9/L (ref 0–0.7)
EOSINOPHIL NFR BLD AUTO: 5.6 %
ERYTHROCYTE [DISTWIDTH] IN BLOOD BY AUTOMATED COUNT: 13.9 % (ref 10–15)
GFR SERPL CREATININE-BSD FRML MDRD: 71 ML/MIN/{1.73_M2}
GLUCOSE SERPL-MCNC: 90 MG/DL (ref 70–99)
HCT VFR BLD AUTO: 42.3 % (ref 35–47)
HGB BLD-MCNC: 14.4 G/DL (ref 11.7–15.7)
LYMPHOCYTES # BLD AUTO: 2 10E9/L (ref 0.8–5.3)
LYMPHOCYTES NFR BLD AUTO: 22.6 %
MCH RBC QN AUTO: 32.1 PG (ref 26.5–33)
MCHC RBC AUTO-ENTMCNC: 34 G/DL (ref 31.5–36.5)
MCV RBC AUTO: 94 FL (ref 78–100)
MONOCYTES # BLD AUTO: 0.9 10E9/L (ref 0–1.3)
MONOCYTES NFR BLD AUTO: 9.4 %
NEUTROPHILS # BLD AUTO: 5.6 10E9/L (ref 1.6–8.3)
NEUTROPHILS NFR BLD AUTO: 62 %
PLATELET # BLD AUTO: 280 10E9/L (ref 150–450)
POTASSIUM SERPL-SCNC: 4 MMOL/L (ref 3.4–5.3)
RBC # BLD AUTO: 4.48 10E12/L (ref 3.8–5.2)
SODIUM SERPL-SCNC: 142 MMOL/L (ref 133–144)
WBC # BLD AUTO: 9 10E9/L (ref 4–11)

## 2019-08-19 PROCEDURE — 93000 ELECTROCARDIOGRAM COMPLETE: CPT | Performed by: NURSE PRACTITIONER

## 2019-08-19 PROCEDURE — 85025 COMPLETE CBC W/AUTO DIFF WBC: CPT | Performed by: NURSE PRACTITIONER

## 2019-08-19 PROCEDURE — 99215 OFFICE O/P EST HI 40 MIN: CPT | Performed by: NURSE PRACTITIONER

## 2019-08-19 PROCEDURE — 36415 COLL VENOUS BLD VENIPUNCTURE: CPT | Performed by: NURSE PRACTITIONER

## 2019-08-19 PROCEDURE — 80048 BASIC METABOLIC PNL TOTAL CA: CPT | Performed by: NURSE PRACTITIONER

## 2019-08-19 ASSESSMENT — MIFFLIN-ST. JEOR: SCORE: 1335.06

## 2019-08-19 NOTE — PROGRESS NOTES
[place] [address] [address] [facility] [address]  [phone]  Dept: [phone]    PRE-OP EVALUATION:  Today's date: 2019    Ramya Hillman (: 1945) presents for pre-operative evaluation assessment as requested by Dr. Sanchez.  She requires evaluation and anesthesia risk assessment prior to undergoing surgery/procedure for treatment of right foot .    Fax number for surgical facility: [facility]  Primary Physician: Madan Lizama  Type of Anesthesia Anticipated: to be determined    Patient has a Health Care Directive or Living Will:  NO    Preop Questions 2019   Who is doing your surgery? Dr. OTTO Sanchez  [facility]   What are you having done? Bunion surgery on right foot   Date of Surgery/Procedure: Aug. 26, 2019   Facility or Hospital where procedure/surgery will be performed: [facility]   1.  Do you have a history of Heart attack, stroke, stent, coronary bypass surgery, or other heart surgery? No   2.  Do you ever have any pain or discomfort in your chest? No   3.  Do you have a history of  Heart Failure? No   4.   Are you troubled by shortness of breath when:  walking on a level surface, or up a slight hill, or at night? No   5.  Do you currently have a cold, bronchitis or other respiratory infection? No   6.  Do you have a cough, shortness of breath, or wheezing? No   7.  Do you sometimes get pains in the calves of your legs when you walk? YES -    8. Do you or anyone in your family have previous history of blood clots? No   9.  Do you or does anyone in your family have a serious bleeding problem such as prolonged bleeding following surgeries or cuts? No   10. Have you ever had problems with anemia or been told to take iron pills? No   11. Have you had any abnormal blood loss such as black, tarry or bloody stools, or abnormal vaginal bleeding? No   12. Have you ever had a blood transfusion? No   13. Have you or any of your relatives ever  had problems with anesthesia? No   14. Do you have sleep apnea, excessive snoring or daytime drowsiness? No   15. Do you have any prosthetic heart valves? No   16. Do you have prosthetic joints? No   17. Is there any chance that you may be pregnant? No         HPI:     HPI related to upcoming procedure: right foot bunion      See problem list for active medical problems.  Problems all longstanding and stable, except as noted/documented.  See ROS for pertinent symptoms related to these conditions.    CAD - Patient has a longstanding history stable angina with negative stress echocardiogram and no dx of CAD by cardiology . Patient denies  chest pain or NTG use for several years, denies exercise induced dyspnea or PND. Last Stress test 2015  - normal stress echocardiogram, EKG 8/19/19    PAD- 8/13/19: IMPRESSION: able to walk 1/4 mile before developing calf pain  1. Right resting ZOILA shows moderate arterial insufficiency which  becomes severe with exercise.  2. Left resting ZOILA is normal with moderate exercise-induced arterial  Insufficiency.    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)      Priority: Medium     Chronic ischemic heart disease 11/02/2017     Priority: Medium     Painful tongue 05/10/2017     Priority: Medium     Hyperlipidemia LDL goal <100 05/04/2017     Priority: Medium     PVD (peripheral vascular disease) (H)      Priority: Medium     Chest pain      Priority: Medium      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)      Family history of  heart disease      Hyperlipidemia      PVD (peripheral vascular disease) (H)      Tobacco abuse      Past Surgical History:   Procedure Laterality Date     BIOPSY  3/1/2018    Mole on back     COLONOSCOPY  10/18/2018    1 polyp - all clear     NO HISTORY OF SURGERY       ORTHOPEDIC SURGERY  2012    Rotator Cuff - Shoulder     Current Outpatient Medications   Medication Sig Dispense Refill     aspirin 81 MG tablet Take by mouth daily 1 tablet      Cholecalciferol (VITAMIN D) 1000 UNITS capsule Take 1 capsule by mouth daily       Coenzyme Q10 (CO Q10) 100 MG CAPS Take by mouth daily       multivitamin, therapeutic with minerals (MULTI-VITAMIN) TABS Take 1 tablet by mouth daily       Omega-3 Fatty Acids (FISH OIL) 1000 MG CPDR Take by mouth daily       simvastatin (ZOCOR) 20 MG tablet TAKE ONE TABLET BY MOUTH ONCE DAILY AT BEDTIME 90 tablet 3     OTC products: Aspirin 81mg stopped 19, omega 3 fatty acid    Allergies   Allergen Reactions     No Known Allergies       Latex Allergy: NO    Social History     Tobacco Use     Smoking status: Current Some Day Smoker     Years: 30.00     Types: Cigarettes     Start date: 1990     Last attempt to quit: 2018     Years since quittin.0     Smokeless tobacco: Never Used     Tobacco comment:  2-3 cigs per day off and on   Substance Use Topics     Alcohol use: Yes     Alcohol/week: 0.0 oz     History   Drug Use No       REVIEW OF SYSTEMS:   CONSTITUTIONAL: NEGATIVE for fever, chills, change in weight  ENT/MOUTH: NEGATIVE for ear, mouth and throat problems  RESP: NEGATIVE for significant cough or SOB  CV: NEGATIVE for chest pain, palpitations or peripheral edema  MUSCULOSKELETAL: positive right and left leg pain with exercise  PSYCHIATRIC: NEGATIVE for changes in mood or affect  ROS otherwise negative    EXAM:   BP (!) 141/79 (BP Location: Left arm, Patient Position: Sitting, Cuff Size: Adult Regular)   Pulse 76   Temp 96.7  F (35.9  C) (Tympanic)   Ht 1.778 m  "(5' 10\")   Wt 74.5 kg (164 lb 3.2 oz)   SpO2 100%   BMI 23.56 kg/m      GENERAL APPEARANCE: healthy, alert and no distress     EYES: EOMI, PERRL     HENT: ear canals and TM's normal and nose and mouth without ulcers or lesions     NECK: no adenopathy, no asymmetry, masses, or scars and thyroid normal to palpation     RESP: lungs clear to auscultation - no rales, rhonchi or wheezes     CV: regular rates and rhythm, normal S1 S2, no S3 or S4 and no murmur, click or rub, imperceptible pedal pulses     ABDOMEN:  soft, nontender, no HSM or masses and bowel sounds normal     MS: extremities normal- no gross deformities noted, no evidence of inflammation in joints, FROM in all extremities.     SKIN: no suspicious lesions or rashes     NEURO: Normal strength and tone, sensory exam grossly normal, mentation intact and speech normal     PSYCH: mentation appears normal. and affect normal/bright     LYMPHATICS: No cervical adenopathy    DIAGNOSTICS:   EKG: Sinus  Rhythm   -Left atrial enlargement.       Recent Labs   Lab Test 11/21/18  0901 11/02/17  1606   HGB 13.5 14.2    267    142   POTASSIUM 4.0 4.6   CR 0.78 0.81      Results for orders placed or performed in visit on 08/19/19 (from the past 24 hour(s))   CBC with platelets and differential   Result Value Ref Range    WBC 9.0 4.0 - 11.0 10e9/L    RBC Count 4.48 3.8 - 5.2 10e12/L    Hemoglobin 14.4 11.7 - 15.7 g/dL    Hematocrit 42.3 35.0 - 47.0 %    MCV 94 78 - 100 fl    MCH 32.1 26.5 - 33.0 pg    MCHC 34.0 31.5 - 36.5 g/dL    RDW 13.9 10.0 - 15.0 %    Platelet Count 280 150 - 450 10e9/L    % Neutrophils 62.0 %    % Lymphocytes 22.6 %    % Monocytes 9.4 %    % Eosinophils 5.6 %    % Basophils 0.4 %    Absolute Neutrophil 5.6 1.6 - 8.3 10e9/L    Absolute Lymphocytes 2.0 0.8 - 5.3 10e9/L    Absolute Monocytes 0.9 0.0 - 1.3 10e9/L    Absolute Eosinophils 0.5 0.0 - 0.7 10e9/L    Absolute Basophils 0.0 0.0 - 0.2 10e9/L    Diff Method Automated Method    Basic " metabolic panel   Result Value Ref Range    Sodium 142 133 - 144 mmol/L    Potassium 4.0 3.4 - 5.3 mmol/L    Chloride 110 (H) 94 - 109 mmol/L    Carbon Dioxide 27 20 - 32 mmol/L    Anion Gap 5 3 - 14 mmol/L    Glucose 90 70 - 99 mg/dL    Urea Nitrogen 15 7 - 30 mg/dL    Creatinine 0.82 0.52 - 1.04 mg/dL    GFR Estimate 71 >60 mL/min/[1.73_m2]    GFR Estimate If Black 82 >60 mL/min/[1.73_m2]    Calcium 9.1 8.5 - 10.1 mg/dL       IMPRESSION:   Reason for surgery/procedure: right bunionectomy  Diagnosis/reason for consult: pre op consult    The proposed surgical procedure is considered INTERMEDIATE risk.    REVISED CARDIAC RISK INDEX  The patient has the following serious cardiovascular risks for perioperative complications such as (MI, PE, VFib and 3  AV Block):  No serious cardiac risks  INTERPRETATION: 0 risks: Class I (very low risk - 0.4% complication rate)    The patient has the following additional risks for perioperative complications:  No identified additional risks  PAD      ICD-10-CM    1. Preop general physical exam Z01.818 EKG 12-lead complete w/read - Clinics     CBC with platelets and differential   2. Bunion, right M21.611    3. PAD (peripheral artery disease) (H) I73.9        RECOMMENDATIONS:       --Patient is to take all scheduled medications on the day of surgery EXCEPT for modifications listed below.    Anticoagulant or Antiplatelet Medication Use  Has held her aspirin 81mg since 8/16/19  Will discontinue omega 3   And discontinue other vitamins        Approval given for surgery as planned without further evaluation   Verbal approval for surgery by Dr Salinas vascular after reviewing results of ZOILA with the patient.  His report is to be made available to Dr Sanchez          Signed Electronically by: KEISHA La CNP    Copy of this evaluation report is provided to requesting physician.    [facility] Preop Guidelines    Revised Cardiac Risk Index

## 2019-08-20 NOTE — PROGRESS NOTES
VASCULAR SURGERY CLINIC CONSULTATION    VASCULAR SURGEON: Fuad Salinas MD    LOCATION:  [facility]    Ramya Hillman   Medical Record #:  [id number]  YOB: 1945  Age:  74 year old     Date of Service: 8/13/2019    PRIMARY CARE PROVIDER: Madan Lizama      Reason for visit: Right leg cramping pain    IMPRESSION:  73 yo female with right leg claudication at 4 minutes of walking or 2 blocks.     RECOMMENDATION:  I discussed with Ramya that her claudication is not yet life-style limiting.  She stops and rests and the symptoms improve and she is able to walk the same distance.  Her digit pressures are normal, > 60 mmHg and suggest she should heal any wound or surgical wound.  She is planning on having a Bunion surgery soon and based on the digit pressures healing shouldn't be an issue.  She is not a diabetic so digital arteries should not be calcified.  We will see her back in 6 months to evaluate her progress with a walking program and her claudication.  I recommend ASA 81 mg po daily, Statin medication LDL < 100 mg/dL target, and smoking cessation.    HPI:  Ramya Hillman is a 74 year old female who was seen today in consultation regarding bilateral lower leg claudication.  She is a smoker and we discussed that she needs to quit smoking all together.  It sounds like she currently only uses tobacco occasionally.  The patient can walk about 2 blocks/4 minutes and then she gets cramping calf pain that resolves with short rest.  This distance is very repeatable.  She denies chest pain and no stroke or TIA symptoms.  Currently, she does not feel her lifestyle is severely limited by her symptoms.  She is planning on having bunion surgery on the right foot soon.  Her ZOILA's are 0.66 on the right and 0.89 on the left, with digit pressures > 60 mmHg bilaterally.  The patient is also denying rest pain and no tissue loss noted.     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)      Family history of heart disease      Hyperlipidemia      PVD (peripheral vascular disease) (H)      Tobacco abuse         Past Surgical History:   Procedure Laterality Date     BIOPSY  3/1/2018    Mole on back     COLONOSCOPY  10/18/2018    1 polyp - all clear     NO HISTORY OF SURGERY       ORTHOPEDIC SURGERY  2012    Rotator Cuff - Shoulder       ALLERGIES:  No known allergies    MEDS:    Current Outpatient Medications:      aspirin 81 MG tablet, Take by mouth daily, Disp: 1 tablet, Rfl:      Cholecalciferol (VITAMIN D) 1000 UNITS capsule, Take 1 capsule by mouth daily, Disp: , Rfl:      Coenzyme Q10 (CO Q10) 100 MG CAPS, Take by mouth daily, Disp: , Rfl:      multivitamin, therapeutic with minerals (MULTI-VITAMIN) TABS, Take 1 tablet by mouth daily, Disp: , Rfl:      Omega-3 Fatty Acids (FISH OIL) 1000 MG CPDR, Take by mouth daily, Disp: , Rfl:      simvastatin (ZOCOR) 20 MG tablet, TAKE ONE TABLET BY MOUTH ONCE DAILY AT BEDTIME, Disp: 90 tablet, Rfl: 3    SOCIAL HABITS:    History   Smoking Status     Current Some Day Smoker     Years: 30.00     Types: Cigarettes     Start date: 1990     Last attempt to quit: 2018   Smokeless Tobacco     Never Used     Comment:  2-3 cigs per day off and on     Social History    Substance and Sexual Activity      Alcohol use: Yes        Alcohol/week: 0.0 oz      History   Drug Use No       FAMILY HISTORY:    Family History   Problem Relation Age of Onset     Cerebrovascular Disease Mother      Coronary Artery Disease Mother              Osteoporosis Mother      Hyperlipidemia Mother      Coronary Artery Disease Father 81             Hyperlipidemia Father      Coronary  Artery Disease Brother              Hyperlipidemia Brother      Coronary Artery Disease Sister 52             Hyperlipidemia Sister      Coronary Artery Disease Brother 62     Hyperlipidemia Brother      Coronary Artery Disease Sister      Hyperlipidemia Sister      Coronary Artery Disease Sister      Hyperlipidemia Sister      Breast Cancer Sister 80             Breast Cancer Niece      Coronary Artery Disease Sister      Coronary Artery Disease Sister        REVIEW OF SYSTEMS:    A 12 point ROS was reviewed and except for what is listed in the HPI above, all others are negative    PE:  /72 (BP Location: Left arm, Patient Position: Chair, Cuff Size: Adult Regular)   Pulse 84   Breastfeeding? No   Wt Readings from Last 1 Encounters:   19 164 lb 3.2 oz (74.5 kg)     There is no height or weight on file to calculate BMI.    EXAM:  GENERAL: This is a well-developed 74 year old female who appears her stated age  EYES: Grossly normal.  MOUTH: Buccal mucosa normal   CARDIAC:  NS1 S2, No Murmur  CHEST/LUNG:  Clear lung fields bilaterally   GASTROINTESINAL (ABDOMEN): Soft, non-tender, B/S present, no pulsatile mass  MUSCULOSKELETAL: Grossly normal and both lower extremities are intact.  HEME/LYMPH: No lymphedema  NEUROLOGIC: Focally intact, Alert and oriented x 3. Sensation intact in both feet.   PSYCH: appropriate affect  INTEGUMENT: No open lesions or ulcers.  No significant dependent rubor.    Pulse Exam:     Radial: Left 2   Right  2    DP: Left 1   Right  1     PT:   Left 1   Right  1          DIAGNOSTIC STUDIES:     Images:  Us Zoila Doppler With Exercise Bilateral    Result Date: 2019  ULTRASOUND ZOILA DOPPLER WITH EXERCISE BILATERAL   2019 10:00 AM HISTORY: History of right leg pain with walking. Other specified symptoms and signs involving the circulatory and respiratory systems. Pain of right lower leg. COMPARISON: None. FINDINGS: Right ZOILA: 0.66. Left ZOILA: 0.98. Right  digital brachial index: 0.66, right digital pressure: 117. Left digital brachial index: 0.89, left digital pressure: 158. Waveforms: Triphasic in the right femoral, monophasic in the right popliteal and tibial vessels. Triphasic throughout the left. Exercise: Patient exercised on a treadmill for 5 minutes at 1.9 miles per hour and a 10% incline. At 3 minutes and 50 seconds the patient's right ankle became tired with aching in the right calf. At 4 minutes and 40 seconds worsening calf cramping. Right exercise ZOILA: 0.25. Left exercise ZOILA: 0.72.     IMPRESSION: 1. Right resting ZOILA shows moderate arterial insufficiency which becomes severe with exercise. 2. Left resting ZOILA is normal with moderate exercise-induced arterial insufficiency. ZOILA CRITERIA: >0.95 Normal 0.90 - 0.94 Mild 0.5 - 0.89 Moderate 0.2 - 0.49 Severe <0.2 Critical JULIUS MOSES,       I personally reviewed the images and my interpretation is ZOILA is abnormal , but toe pressures are > 60 mmHg bilaterally.     LABS:      Sodium   Date Value Ref Range Status   08/19/2019 142 133 - 144 mmol/L Final   11/21/2018 141 133 - 144 mmol/L Final   11/02/2017 142 133 - 144 mmol/L Final     Urea Nitrogen   Date Value Ref Range Status   08/19/2019 15 7 - 30 mg/dL Final   11/21/2018 20 7 - 30 mg/dL Final   11/02/2017 21 7 - 30 mg/dL Final     Hemoglobin   Date Value Ref Range Status   08/19/2019 14.4 11.7 - 15.7 g/dL Final   11/21/2018 13.5 11.7 - 15.7 g/dL Final   11/02/2017 14.2 11.7 - 15.7 g/dL Final     Platelet Count   Date Value Ref Range Status   08/19/2019 280 150 - 450 10e9/L Final   11/21/2018 265 150 - 450 10e9/L Final   11/02/2017 267 150 - 450 10e9/L Final       Fuad Salinas MD  VASCULAR SURGERY

## 2019-08-21 ENCOUNTER — TELEPHONE (OUTPATIENT)
Dept: OTHER | Facility: CLINIC | Age: 74
End: 2019-08-21

## 2019-08-21 NOTE — TELEPHONE ENCOUNTER
Ramya called stating that she needs her last OV with Dr. Salinas faxed over to [facility] #[phone] ASAP for her surgery this Monday morning.   I discussed that Dr. Mckeon note is not complete and that I will send him a message in regards to Ramya needing it sent to [facility] for clearance for surgery.    Sophia MAHERN, RN

## 2019-08-22 NOTE — TELEPHONE ENCOUNTER
Note completed and faxed to [facility] #[phone].   Right fax confirmed at [phone].  I called Ramya and discussed that the information she requested was faxed to [facility] and she verbalized understanding.    Sophia MAHERN, RN

## 2019-08-26 ENCOUNTER — ANESTHESIA EVENT (OUTPATIENT)
Dept: SURGERY | Facility: CLINIC | Age: 74
End: 2019-08-26
Payer: COMMERCIAL

## 2019-08-26 ENCOUNTER — HOSPITAL ENCOUNTER (OUTPATIENT)
Facility: CLINIC | Age: 74
Discharge: HOME | End: 2019-08-26
Attending: ORTHOPAEDIC SURGERY | Admitting: ORTHOPAEDIC SURGERY
Payer: COMMERCIAL

## 2019-08-26 ENCOUNTER — APPOINTMENT (OUTPATIENT)
Dept: GENERAL RADIOLOGY | Facility: CLINIC | Age: 74
End: 2019-08-26
Attending: ORTHOPAEDIC SURGERY
Payer: COMMERCIAL

## 2019-08-26 ENCOUNTER — ANESTHESIA (OUTPATIENT)
Dept: SURGERY | Facility: CLINIC | Age: 74
End: 2019-08-26
Payer: COMMERCIAL

## 2019-08-26 VITALS
OXYGEN SATURATION: 97 % | TEMPERATURE: 96.4 F | BODY MASS INDEX: 22.69 KG/M2 | SYSTOLIC BLOOD PRESSURE: 153 MMHG | WEIGHT: 162.06 LBS | DIASTOLIC BLOOD PRESSURE: 80 MMHG | HEART RATE: 88 BPM | RESPIRATION RATE: 16 BRPM | HEIGHT: 71 IN

## 2019-08-26 DIAGNOSIS — Z98.890 S/P FOOT SURGERY: Primary | ICD-10-CM

## 2019-08-26 PROCEDURE — 27110028 ZZH OR GENERAL SUPPLY NON-STERILE: Performed by: ORTHOPAEDIC SURGERY

## 2019-08-26 PROCEDURE — 25000128 H RX IP 250 OP 636: Performed by: PHYSICIAN ASSISTANT

## 2019-08-26 PROCEDURE — 37000009 ZZH ANESTHESIA TECHNICAL FEE, EACH ADDTL 15 MIN: Performed by: ORTHOPAEDIC SURGERY

## 2019-08-26 PROCEDURE — 36000058 ZZH SURGERY LEVEL 3 EA 15 ADDTL MIN: Performed by: ORTHOPAEDIC SURGERY

## 2019-08-26 PROCEDURE — 25800030 ZZH RX IP 258 OP 636: Performed by: NURSE ANESTHETIST, CERTIFIED REGISTERED

## 2019-08-26 PROCEDURE — 37000008 ZZH ANESTHESIA TECHNICAL FEE, 1ST 30 MIN: Performed by: ORTHOPAEDIC SURGERY

## 2019-08-26 PROCEDURE — C1713 ANCHOR/SCREW BN/BN,TIS/BN: HCPCS | Performed by: ORTHOPAEDIC SURGERY

## 2019-08-26 PROCEDURE — 25000125 ZZHC RX 250: Performed by: ORTHOPAEDIC SURGERY

## 2019-08-26 PROCEDURE — 25000128 H RX IP 250 OP 636: Performed by: NURSE ANESTHETIST, CERTIFIED REGISTERED

## 2019-08-26 PROCEDURE — 40000277 XR SURGERY CARM FLUORO LESS THAN 5 MIN W STILLS

## 2019-08-26 PROCEDURE — 40000170 ZZH STATISTIC PRE-PROCEDURE ASSESSMENT II: Performed by: ORTHOPAEDIC SURGERY

## 2019-08-26 PROCEDURE — 25000125 ZZHC RX 250: Performed by: NURSE ANESTHETIST, CERTIFIED REGISTERED

## 2019-08-26 PROCEDURE — 71000012 ZZH RECOVERY PHASE 1 LEVEL 1 FIRST HR: Performed by: ORTHOPAEDIC SURGERY

## 2019-08-26 PROCEDURE — 25000566 ZZH SEVOFLURANE, EA 15 MIN: Performed by: ORTHOPAEDIC SURGERY

## 2019-08-26 PROCEDURE — 25000132 ZZH RX MED GY IP 250 OP 250 PS 637: Performed by: PHYSICIAN ASSISTANT

## 2019-08-26 PROCEDURE — 36000060 ZZH SURGERY LEVEL 3 W FLUORO 1ST 30 MIN: Performed by: ORTHOPAEDIC SURGERY

## 2019-08-26 PROCEDURE — 27210794 ZZH OR GENERAL SUPPLY STERILE: Performed by: ORTHOPAEDIC SURGERY

## 2019-08-26 PROCEDURE — 25000132 ZZH RX MED GY IP 250 OP 250 PS 637: Performed by: ORTHOPAEDIC SURGERY

## 2019-08-26 PROCEDURE — 71000027 ZZH RECOVERY PHASE 2 EACH 15 MINS: Performed by: ORTHOPAEDIC SURGERY

## 2019-08-26 DEVICE — IMP SCR SYN CORTEX 2.0X16MM SELF TAP SS 201.816: Type: IMPLANTABLE DEVICE | Site: FOOT | Status: FUNCTIONAL

## 2019-08-26 DEVICE — IMP SCR SYN CORTEX 1.5X16MM SELF TAP SS 200.816: Type: IMPLANTABLE DEVICE | Site: FOOT | Status: FUNCTIONAL

## 2019-08-26 RX ORDER — CEFAZOLIN SODIUM 1 G/50ML
2 SOLUTION INTRAVENOUS
Status: COMPLETED | OUTPATIENT
Start: 2019-08-26 | End: 2019-08-26

## 2019-08-26 RX ORDER — LIDOCAINE HYDROCHLORIDE 20 MG/ML
INJECTION, SOLUTION INFILTRATION; PERINEURAL PRN
Status: DISCONTINUED | OUTPATIENT
Start: 2019-08-26 | End: 2019-08-26

## 2019-08-26 RX ORDER — ONDANSETRON 4 MG/1
4-8 TABLET, ORALLY DISINTEGRATING ORAL EVERY 6 HOURS PRN
Qty: 15 TABLET | Refills: 0 | Status: SHIPPED | OUTPATIENT
Start: 2019-08-26 | End: 2019-12-10

## 2019-08-26 RX ORDER — AMOXICILLIN 250 MG
1-2 CAPSULE ORAL 2 TIMES DAILY
Qty: 30 TABLET | Refills: 0 | Status: SHIPPED | OUTPATIENT
Start: 2019-08-26 | End: 2019-12-10

## 2019-08-26 RX ORDER — SODIUM CHLORIDE, SODIUM LACTATE, POTASSIUM CHLORIDE, CALCIUM CHLORIDE 600; 310; 30; 20 MG/100ML; MG/100ML; MG/100ML; MG/100ML
INJECTION, SOLUTION INTRAVENOUS CONTINUOUS
Status: DISCONTINUED | OUTPATIENT
Start: 2019-08-26 | End: 2019-08-26 | Stop reason: HOSPADM

## 2019-08-26 RX ORDER — ONDANSETRON 2 MG/ML
INJECTION INTRAMUSCULAR; INTRAVENOUS PRN
Status: DISCONTINUED | OUTPATIENT
Start: 2019-08-26 | End: 2019-08-26

## 2019-08-26 RX ORDER — IBUPROFEN 600 MG/1
600 TABLET, FILM COATED ORAL EVERY 6 HOURS PRN
Qty: 12 TABLET | Refills: 0 | Status: SHIPPED | OUTPATIENT
Start: 2019-08-26 | End: 2019-12-10

## 2019-08-26 RX ORDER — FENTANYL CITRATE 50 UG/ML
25-50 INJECTION, SOLUTION INTRAMUSCULAR; INTRAVENOUS
Status: DISCONTINUED | OUTPATIENT
Start: 2019-08-26 | End: 2019-08-26 | Stop reason: HOSPADM

## 2019-08-26 RX ORDER — ROPIVACAINE HYDROCHLORIDE 5 MG/ML
INJECTION, SOLUTION EPIDURAL; INFILTRATION; PERINEURAL
Status: DISCONTINUED
Start: 2019-08-26 | End: 2019-08-26 | Stop reason: HOSPADM

## 2019-08-26 RX ORDER — PROPOFOL 10 MG/ML
INJECTION, EMULSION INTRAVENOUS PRN
Status: DISCONTINUED | OUTPATIENT
Start: 2019-08-26 | End: 2019-08-26

## 2019-08-26 RX ORDER — IBUPROFEN 600 MG/1
600 TABLET, FILM COATED ORAL EVERY 6 HOURS PRN
Status: DISCONTINUED | OUTPATIENT
Start: 2019-08-26 | End: 2019-08-26 | Stop reason: HOSPADM

## 2019-08-26 RX ORDER — ONDANSETRON 2 MG/ML
4 INJECTION INTRAMUSCULAR; INTRAVENOUS EVERY 30 MIN PRN
Status: DISCONTINUED | OUTPATIENT
Start: 2019-08-26 | End: 2019-08-26 | Stop reason: HOSPADM

## 2019-08-26 RX ORDER — DEXAMETHASONE SODIUM PHOSPHATE 4 MG/ML
INJECTION, SOLUTION INTRA-ARTICULAR; INTRALESIONAL; INTRAMUSCULAR; INTRAVENOUS; SOFT TISSUE PRN
Status: DISCONTINUED | OUTPATIENT
Start: 2019-08-26 | End: 2019-08-26

## 2019-08-26 RX ORDER — OXYCODONE AND ACETAMINOPHEN 5; 325 MG/1; MG/1
1-2 TABLET ORAL EVERY 4 HOURS PRN
Qty: 30 TABLET | Refills: 0 | Status: SHIPPED | OUTPATIENT
Start: 2019-08-26 | End: 2019-12-10

## 2019-08-26 RX ORDER — ONDANSETRON 4 MG/1
4 TABLET, ORALLY DISINTEGRATING ORAL EVERY 30 MIN PRN
Status: DISCONTINUED | OUTPATIENT
Start: 2019-08-26 | End: 2019-08-26 | Stop reason: HOSPADM

## 2019-08-26 RX ORDER — FENTANYL CITRATE 50 UG/ML
INJECTION, SOLUTION INTRAMUSCULAR; INTRAVENOUS PRN
Status: DISCONTINUED | OUTPATIENT
Start: 2019-08-26 | End: 2019-08-26

## 2019-08-26 RX ORDER — PROPOFOL 10 MG/ML
INJECTION, EMULSION INTRAVENOUS CONTINUOUS PRN
Status: DISCONTINUED | OUTPATIENT
Start: 2019-08-26 | End: 2019-08-26

## 2019-08-26 RX ORDER — HYDROMORPHONE HYDROCHLORIDE 1 MG/ML
.3-.5 INJECTION, SOLUTION INTRAMUSCULAR; INTRAVENOUS; SUBCUTANEOUS EVERY 5 MIN PRN
Status: DISCONTINUED | OUTPATIENT
Start: 2019-08-26 | End: 2019-08-26 | Stop reason: HOSPADM

## 2019-08-26 RX ORDER — MAGNESIUM HYDROXIDE 1200 MG/15ML
LIQUID ORAL PRN
Status: DISCONTINUED | OUTPATIENT
Start: 2019-08-26 | End: 2019-08-26 | Stop reason: HOSPADM

## 2019-08-26 RX ORDER — CEFAZOLIN SODIUM 1 G/3ML
1 INJECTION, POWDER, FOR SOLUTION INTRAMUSCULAR; INTRAVENOUS SEE ADMIN INSTRUCTIONS
Status: DISCONTINUED | OUTPATIENT
Start: 2019-08-26 | End: 2019-08-26 | Stop reason: HOSPADM

## 2019-08-26 RX ORDER — OXYCODONE AND ACETAMINOPHEN 5; 325 MG/1; MG/1
1 TABLET ORAL
Status: COMPLETED | OUTPATIENT
Start: 2019-08-26 | End: 2019-08-26

## 2019-08-26 RX ORDER — SODIUM CHLORIDE, SODIUM LACTATE, POTASSIUM CHLORIDE, CALCIUM CHLORIDE 600; 310; 30; 20 MG/100ML; MG/100ML; MG/100ML; MG/100ML
INJECTION, SOLUTION INTRAVENOUS CONTINUOUS PRN
Status: DISCONTINUED | OUTPATIENT
Start: 2019-08-26 | End: 2019-08-26

## 2019-08-26 RX ORDER — NALOXONE HYDROCHLORIDE 0.4 MG/ML
.1-.4 INJECTION, SOLUTION INTRAMUSCULAR; INTRAVENOUS; SUBCUTANEOUS
Status: DISCONTINUED | OUTPATIENT
Start: 2019-08-26 | End: 2019-08-26 | Stop reason: HOSPADM

## 2019-08-26 RX ORDER — EPHEDRINE SULFATE 50 MG/ML
INJECTION, SOLUTION INTRAMUSCULAR; INTRAVENOUS; SUBCUTANEOUS PRN
Status: DISCONTINUED | OUTPATIENT
Start: 2019-08-26 | End: 2019-08-26

## 2019-08-26 RX ORDER — ONDANSETRON 4 MG/1
4 TABLET, ORALLY DISINTEGRATING ORAL
Status: DISCONTINUED | OUTPATIENT
Start: 2019-08-26 | End: 2019-08-26 | Stop reason: HOSPADM

## 2019-08-26 RX ADMIN — FENTANYL CITRATE 25 MCG: 50 INJECTION, SOLUTION INTRAMUSCULAR; INTRAVENOUS at 08:42

## 2019-08-26 RX ADMIN — DEXAMETHASONE SODIUM PHOSPHATE 4 MG: 4 INJECTION, SOLUTION INTRA-ARTICULAR; INTRALESIONAL; INTRAMUSCULAR; INTRAVENOUS; SOFT TISSUE at 08:16

## 2019-08-26 RX ADMIN — Medication 5 MG: at 08:51

## 2019-08-26 RX ADMIN — LIDOCAINE HYDROCHLORIDE 100 MG: 20 INJECTION, SOLUTION INFILTRATION; PERINEURAL at 08:07

## 2019-08-26 RX ADMIN — ONDANSETRON 4 MG: 2 INJECTION INTRAMUSCULAR; INTRAVENOUS at 08:38

## 2019-08-26 RX ADMIN — SODIUM CHLORIDE, POTASSIUM CHLORIDE, SODIUM LACTATE AND CALCIUM CHLORIDE: 600; 310; 30; 20 INJECTION, SOLUTION INTRAVENOUS at 08:04

## 2019-08-26 RX ADMIN — OXYCODONE HYDROCHLORIDE AND ACETAMINOPHEN 1 TABLET: 5; 325 TABLET ORAL at 09:30

## 2019-08-26 RX ADMIN — CEFAZOLIN 2 G: 1 INJECTION, POWDER, FOR SOLUTION INTRAMUSCULAR; INTRAVENOUS at 08:15

## 2019-08-26 RX ADMIN — MIDAZOLAM 2 MG: 1 INJECTION INTRAMUSCULAR; INTRAVENOUS at 08:05

## 2019-08-26 RX ADMIN — Medication 5 MG: at 08:22

## 2019-08-26 RX ADMIN — FENTANYL CITRATE 50 MCG: 50 INJECTION, SOLUTION INTRAMUSCULAR; INTRAVENOUS at 08:07

## 2019-08-26 RX ADMIN — PROPOFOL 50 MCG/KG/MIN: 10 INJECTION, EMULSION INTRAVENOUS at 08:07

## 2019-08-26 RX ADMIN — FENTANYL CITRATE 25 MCG: 50 INJECTION, SOLUTION INTRAMUSCULAR; INTRAVENOUS at 08:33

## 2019-08-26 RX ADMIN — Medication 5 MG: at 08:40

## 2019-08-26 RX ADMIN — PROPOFOL 200 MG: 10 INJECTION, EMULSION INTRAVENOUS at 08:07

## 2019-08-26 RX ADMIN — IBUPROFEN 600 MG: 600 TABLET ORAL at 09:37

## 2019-08-26 ASSESSMENT — MIFFLIN-ST. JEOR: SCORE: 1333.3

## 2019-08-26 ASSESSMENT — LIFESTYLE VARIABLES: TOBACCO_USE: 1

## 2019-08-26 NOTE — ANESTHESIA PREPROCEDURE EVALUATION
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)      Family history of heart disease      Hyperlipidemia      PVD (peripheral vascular disease) (H)      Tobacco abuse      Past Surgical History:   Procedure Laterality Date     BIOPSY  3/1/2018    Mole on back     COLONOSCOPY  10/18/2018    1 polyp - all clear     NO HISTORY OF SURGERY       ORTHOPEDIC SURGERY  2012    Rotator Cuff - Shoulder     Social History     Tobacco Use     Smoking status: Former Smoker     Years: 30.00     Types: Cigarettes     Start date: 1990     Last attempt to quit: 2018     Years since quittin.0     Smokeless tobacco: Never Used     Tobacco comment:  2-3 cigs per day off and on   Substance Use Topics     Alcohol use: Yes     Alcohol/week: 0.0 oz     Comment: rare     Prior to Admission medications    Medication Sig Start Date End Date Taking? Authorizing Provider   ibuprofen (ADVIL/MOTRIN) 600 MG tablet Take 1 tablet (600 mg) by mouth every 6 hours as needed for other (mild and/or inflammatory pain) 19  Yes Sander Butterfield PA-C   ondansetron (ZOFRAN-ODT) 4 MG ODT tab Take 1-2 tablets (4-8 mg) by mouth every 6 hours as needed for nausea 19  Yes Sander Butterfield PA-C   oxyCODONE-acetaminophen (PERCOCET) 5-325 MG tablet Take 1-2 tablets by mouth every 4 hours as needed for moderate to severe pain 19  Yes Sander Butterfield PA-C   senna-docusate (SENOKOT-S/PERICOLACE) 8.6-50 MG tablet Take 1-2 tablets by mouth 2 times daily 19  Yes Sander Butterfield PA-C   aspirin 81 MG tablet Take by  mouth daily 8/16/16   Tejinder Zavala MD   Cholecalciferol (VITAMIN D) 1000 UNITS capsule Take 1 capsule by mouth daily    Reported, Patient   Coenzyme Q10 (CO Q10) 100 MG CAPS Take by mouth daily    Reported, Patient   multivitamin, therapeutic with minerals (MULTI-VITAMIN) TABS Take 1 tablet by mouth daily    Reported, Patient   Omega-3 Fatty Acids (FISH OIL) 1000 MG CPDR Take by mouth daily    Reported, Patient   simvastatin (ZOCOR) 20 MG tablet TAKE ONE TABLET BY MOUTH ONCE DAILY AT BEDTIME 12/18/18   Madan Lizama MD     Current Facility-Administered Medications Ordered in Epic   Medication Dose Route Frequency Last Rate Last Dose     ceFAZolin (ANCEF) 1 g vial to attach to  ml bag for ADULT or 50 ml bag for PEDS  1 g Intravenous See Admin Instructions         ceFAZolin (ANCEF) intermittent infusion 2 g in 100mL NS (pre-mix)  2 g Intravenous Pre-Op/Pre-procedure x 1 dose         Current Outpatient Medications Ordered in Epic   Medication     ibuprofen (ADVIL/MOTRIN) 600 MG tablet     ondansetron (ZOFRAN-ODT) 4 MG ODT tab     oxyCODONE-acetaminophen (PERCOCET) 5-325 MG tablet     senna-docusate (SENOKOT-S/PERICOLACE) 8.6-50 MG tablet       Wt Readings from Last 1 Encounters:   08/26/19 73.5 kg (162 lb 1 oz)     Temp Readings from Last 1 Encounters:   08/26/19 35.7  C (96.3  F) (Oral)     BP Readings from Last 6 Encounters:   08/26/19 (!) 143/71   08/19/19 (!) 141/79   08/13/19 135/72   11/23/18 (!) 163/96   11/21/18 122/72   01/02/18 154/83     Pulse Readings from Last 4 Encounters:   08/19/19 76   08/13/19 84   11/21/18 83   01/02/18 76     Resp Readings from Last 1 Encounters:   08/26/19 16     SpO2 Readings from Last 1 Encounters:   08/26/19 99%     Recent Labs   Lab Test 08/19/19  1019 11/21/18  0901    141   POTASSIUM 4.0 4.0   CHLORIDE 110* 107   CO2 27 26   ANIONGAP 5 8   GLC 90 87   BUN 15 20   CR 0.82 0.78   ASHLEY 9.1 9.2     Recent Labs   Lab Test 11/21/18  0901 11/02/17  1606   AST  18 21   ALT 22 23   ALKPHOS 67 68   BILITOTAL 0.5 0.6     Recent Labs   Lab Test 19  1019 18  0901   WBC 9.0 7.1   HGB 14.4 13.5    265     No results for input(s): ABO, RH in the last 14404 hours.  No results for input(s): INR, PTT in the last 90061 hours.   No results for input(s): TROPI in the last 26753 hours.  No results for input(s): PH, PCO2, PO2, HCO3 in the last 55490 hours.  No results for input(s): HCG in the last 22159 hours.  Recent Results (from the past 744 hour(s))   US ZOILA Doppler with Exercise Bilateral    Narrative    ULTRASOUND ZOILA DOPPLER WITH EXERCISE BILATERAL   2019 10:00 AM     HISTORY: History of right leg pain with walking. Other specified  symptoms and signs involving the circulatory and respiratory systems.  Pain of right lower leg.    COMPARISON: None.    FINDINGS:  Right ZOILA: 0.66.  Left ZOILA: 0.98.  Right digital brachial index: 0.66, right digital pressure: 117.  Left digital brachial index: 0.89, left digital pressure: 158.    Waveforms: Triphasic in the right femoral, monophasic in the right  popliteal and tibial vessels. Triphasic throughout the left.    Exercise: Patient exercised on a treadmill for 5 minutes at 1.9 miles  per hour and a 10% incline. At 3 minutes and 50 seconds the patient's  right ankle became tired with aching in the right calf. At 4 minutes  and 40 seconds worsening calf cramping.    Right exercise ZOILA: 0.25.  Left exercise ZOILA: 0.72.      Impression    IMPRESSION:   1. Right resting ZOILA shows moderate arterial insufficiency which  becomes severe with exercise.  2. Left resting ZOILA is normal with moderate exercise-induced arterial  insufficiency.    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 Pt has had prior anesthetic.            ROS/MED HX    ENT/Pulmonary:     (+)tobacco use, Current use , . .   (-) sleep apnea   Neurologic:       Cardiovascular:     (+) Dyslipidemia, -Peripheral Vascular Disease-- Non Symptomatic and Other, --. : . . . :. .      (-) CAD   METS/Exercise Tolerance:     Hematologic:         Musculoskeletal:   (+) arthritis,  -       GI/Hepatic:         Renal/Genitourinary:         Endo:         Psychiatric:         Infectious Disease:         Malignancy:         Other:                          Physical Exam  Normal systems: cardiovascular and pulmonary    Airway   Mallampati: I  Neck ROM: full    Dental   (+) missing, implants and caps    Cardiovascular       Pulmonary             Lab Results   Component Value Date    WBC 9.0 08/19/2019    HGB 14.4 08/19/2019    HCT 42.3 08/19/2019     08/19/2019    SED 14 05/04/2017     08/19/2019    POTASSIUM 4.0 08/19/2019    CHLORIDE 110 (H) 08/19/2019    CO2 27 08/19/2019    BUN 15 08/19/2019    CR 0.82 08/19/2019    GLC 90 08/19/2019    ASHLEY 9.1 08/19/2019    ALBUMIN 4.0 11/21/2018    PROTTOTAL 7.7 11/21/2018    ALT 22 11/21/2018    AST 18 11/21/2018    ALKPHOS 67 11/21/2018     "BILITOTAL 0.5 11/21/2018    LIPASE 163 07/26/2006    TSH 2.53 11/21/2018       Preop Vitals  BP Readings from Last 3 Encounters:   08/26/19 (!) 143/71   08/19/19 (!) 141/79   08/13/19 135/72    Pulse Readings from Last 3 Encounters:   08/19/19 76   08/13/19 84   11/21/18 83      Resp Readings from Last 3 Encounters:   08/26/19 16   11/06/17 16    SpO2 Readings from Last 3 Encounters:   08/26/19 99%   08/19/19 100%   11/21/18 98%      Temp Readings from Last 1 Encounters:   08/26/19 35.7  C (96.3  F) (Oral)    Ht Readings from Last 1 Encounters:   08/26/19 1.791 m (5' 10.5\")      Wt Readings from Last 1 Encounters:   08/26/19 73.5 kg (162 lb 1 oz)    Estimated body mass index is 22.92 kg/m  as calculated from the following:    Height as of this encounter: 1.791 m (5' 10.5\").    Weight as of this encounter: 73.5 kg (162 lb 1 oz).       Anesthesia Plan      History & Physical Review  History and physical reviewed and following examination; no interval change.    ASA Status:  3 .    NPO Status:  > 8 hours    Plan for General and LMA with Intravenous induction. Maintenance will be Balanced.    PONV prophylaxis:  Ondansetron (or other 5HT-3)       Postoperative Care  Postoperative pain management:  IV analgesics.      Consents  Anesthetic plan, risks, benefits and alternatives discussed with:  Patient..                 Rojas Gongora MD  "

## 2019-08-26 NOTE — ANESTHESIA CARE TRANSFER NOTE
Patient: Ramya Hillman    Procedure(s):  BUNIONECTOMY RIGHT FOOT    Diagnosis: BUNION RIGHT FOOT  Diagnosis Additional Information: No value filed.    Anesthesia Type:   General, LMA     Note:  Airway :Face Mask  Patient transferred to:PACU  Handoff Report: Identifed the Patient, Identified the Reponsible Provider, Reviewed the pertinent medical history, Discussed the surgical course, Reviewed Intra-OP anesthesia mangement and issues during anesthesia, Set expectations for post-procedure period and Allowed opportunity for questions and acknowledgement of understanding      Vitals: (Last set prior to Anesthesia Care Transfer)    CRNA VITALS  8/26/2019 0826 - 8/26/2019 0901      8/26/2019             Pulse:  81    SpO2:  99 %    Resp Rate (observed):  31  (Abnormal)     Resp Rate (set):  10                Electronically Signed By: KEISHA Cehn CRNA  August 26, 2019  9:01 AM

## 2019-08-26 NOTE — OP NOTE
Procedure Date: 08/26/2019      PREOPERATIVE DIAGNOSES:     1.  Moderately severe right bunion deformity.   2.  Flexible right second clawtoe deformity.      POSTOPERATIVE DIAGNOSES:     1.  Moderately severe right bunion deformity.   2.  Flexible right second clawtoe deformity.      PROCEDURES:     1.  Best of all techniques metatarsal osteotomy and bunion repair as well as an Akin osteotomy of the proximal phalanx.     2.  Percutaneous flexor tenotomy of the right second toe.      ANESTHESIA:  General.      SURGEON:  Dany Sanchez MD      FIRST ASSISTANT:  RUPINDER Henry       SECOND ASSISTANT:  Bishop Quintanilla MD        PREAMBLE:  Ms. Hillman presented with a moderately severe bunion deformity.  She also has a right second clawtoe deformity, which is flexible.      Informed consent was obtained for the above-mentioned procedure.      DESCRIPTION OF PROCEDURE:  After adequate induction of a general anesthetic, the patient was positioned supine on the operating table.  The right leg was sterilely prepped and free draped in the usual fashion.  Tourniquet around the thigh was inflated to 300 mmHg.      A 6 cm longitudinal incision was made medial over the great toe MTP joint.  The capsule was incised longitudinally.  The medial eminence was removed with a saw.  This was followed by a 90-degree metatarsal osteotomy with a long plantar limb.  The capital fragment was translated laterally by 6 mm and immobilized with 2 small screws.  The sesamoids were then reduced and the medial capsule repaired.  This was followed by a 20-degree medially based closing wedge osteotomy of the proximal phalanx to correct hallux interphalangeus.  The osteotomy was secured with a suture.  This gave excellent alignment of her first ray.        A percutaneous flexor tenotomy was then done of the second toe at the middle phalanx level to correct the flexible deformity.        The tourniquet was deflated.  Hemostasis obtained.  The wounds  closed in layers.  A sterile dressing and a light compressive bandage applied.  She tolerated the procedure well and was taken to the recovery room in satisfactory condition.      She can ambulate weightbearing as tolerated.  Sutures will be removed in 2 weeks.         ESAU FOSS MD             D: 2019   T: 2019   MT: LORENA      Name:     BAKARI HESS   MRN:      [id number]        Account:        [id number]   :      1945           Procedure Date: 2019      Document: [id number]

## 2019-08-26 NOTE — ANESTHESIA CARE TRANSFER NOTE
Patient: Ramya Hillman    Procedure(s):  BUNIONECTOMY RIGHT FOOT    Diagnosis: BUNION RIGHT FOOT  Diagnosis Additional Information: No value filed.    Anesthesia Type:   General, LMA     Note:  Anesthesia Care Transfer Notewriter    Vitals: (Last set prior to Anesthesia Care Transfer)    CRNA VITALS  8/26/2019 0826 - 8/26/2019 0901      8/26/2019             Pulse:  81    SpO2:  99 %    Resp Rate (observed):  31  (Abnormal)     Resp Rate (set):  10                Electronically Signed By: KEISHA Chen CRNA  August 26, 2019  9:01 AM

## 2019-08-26 NOTE — DISCHARGE INSTRUCTIONS
Contacted patient to inform she may contact her pulmonologist for an alternate prescription.  Pt verbalizes understanding Same Day Surgery Discharge Instructions for  Sedation and General Anesthesia       It's not unusual to feel dizzy, light-headed or faint for up to 24 hours after surgery or while taking pain medication.  If you have these symptoms: sit for a few minutes before standing and have someone assist you when you get up to walk or use the bathroom.      You should rest and relax for the next 24 hours. We recommend you make arrangements to have an adult stay with you for at least 24 hours after your discharge.  Avoid hazardous and strenuous activity.      DO NOT DRIVE any vehicle or operate mechanical equipment for 24 hours following the end of your surgery.  Even though you may feel normal, your reactions may be affected by the medication you have received.      Do not drink alcoholic beverages for 24 hours following surgery.       Slowly progress to your regular diet as you feel able. It's not unusual to feel nauseated and/or vomit after receiving anesthesia.  If you develop these symptoms, drink clear liquids (apple juice, ginger ale, broth, 7-up, etc. ) until you feel better.  If your nausea and vomiting persists for 24 hours, please notify your surgeon.        All narcotic pain medications, along with inactivity and anesthesia, can cause constipation. Drinking plenty of liquids and increasing fiber intake will help.      For any questions of a medical nature, call your surgeon.      Do not make important decisions for 24 hours.      If you had general anesthesia, you may have a sore throat for a couple of days related to the breathing tube used during surgery.  You may use Cepacol lozenges to help with this discomfort.  If it worsens or if you develop a fever, contact your surgeon.       If you feel your pain is not well managed with the pain medications prescribed by your surgeon, please contact your surgeon's office to let them know so they can address your concerns.     POST-OPERATIVE INSTRUCTIONS  FOOT AND ANKLE  SURGERY  OTTO Sanchez M.D.  Rodolfo Butterfield P.A.-C    These precautions MUST be followed for the first 24 hours after surgery:    Upon discharge, go directly home.    You must make arrangements to have a responsible adult stay with you.    DO NOT DRINK ALCOHOLIC BEVERAGES    It is not unusual to feel lightheaded up to 24 hours after surgery or while taking pain medication.  If you feel lightheaded, sit for a few minutes before standing and have someone assisted you when you get up to walk or use the restroom.    Do not use any mechanical equipment.    Do not make any important or legal decisions for 24 hours or while on pain medications.    You may experience dry mouth, sore throat, or sleep disturbances from the anesthesia and medications used during surgery.  Generalized muscle aches can sometimes occur.  These usually disappear in 12-24 hours.    POST-OPERATIVE CARE GUIDELINES    The following are general guidelines about what to expect the first days/weeks after surgery.  They are not specific, and your recovery may be slightly different.    Blood clots are not common, but are emergencies.  If you have sudden onset pain in your calf or leg, or have sudden shortness of breath, go to the Emergency Department.      Elevation of your operative foot/ankle is key to reducing the swelling in the immediate post-operative phase (first 3-5 days).  When you are at rest, elevate your foot at or above the level of your heart.  When sitting, your foot should be elevated on a chair or stool; not hanging down.      You should plan to rest the day after surgery no matter how minor the procedure.  More complicated procedures will require more time to return to normal activity.      Foot and ankle surgery is painful in most cases.   It is not unusual for the pain to be worse a day or two after surgery than on the day of.  If your pain is more than 8/10 contact our office.  If you don t have pain, gradually decrease your pain  meds by substituting Tylenol.  Please don t use Advil/ibuprofen unless we order it for you.  Pt may resume regular home medications of:  ALL PRESCRIBED BLOOD THINNERS (INCLUDING ASA 81MG) on the day after surgery.        You will be prescribed Percocet or Vicodin for pain, Vistaril for spasms/pain adjunct, Senokot for constipation.  If you have had trouble taking these meds before or experience nausea or vomiting after surgery from your medication, please advise the recovery room nurses.  If you are already at home, try drinking only clear liquids and/or call our office.  Start taking narcotic pain medication when you feel sensation in your lower extremity after a block.       All pain medications, along with inactivity can cause constipation.  Use the Senakot as directed, increase fluid intake to 1 quart per day and increase your dietary fiber.  (The  P  fruits - peaches, plums, pears, and prunes as well as anise/black licorice are recommended.)    It is not unusual to run a low-grade fever after surgery.  If your temperature is elevated above 102 , lasts longer than 24 hours, or is questionable in any way, contact our office.      Drainage from your cast/dressing is normal.  Reinforce your cast/dressing with 4x4 dressings and cover with an Ace wrap.  Wait until 24 hours after surgery to change your dressings; by this time most of your bleeding will have stopped.  If you have drainage through your dressings 2 days after surgery, contact our office.      You cast/dressing will be changed at your 2-week follow up appointment.  They should be kept clean and DRY.  If your cast/dressing is damaged before that, contact our office.      It is normal to experience some bruising in the toes after surgery and they may appear  dark  when your foot hangs down.  It is important to actively wiggle your toes for at least 5-10 minutes each hour UNLESS you had surgery on those toes.      Use ice on your foot/ankle over the  dressing/cast for 20 minutes per hour, 10 or more times per day.  A large bag of frozen peas works well.      Bathing: take a tub or sponge bath instead of a shower if possible during the first two weeks.  If you choose to shower, cover the dressing/cast with a waterproof covering, these may be ordered from www.seal-tight.com or are available at some pharmacies/medical supply stores.  Another option is XeroSox, which is the original vacuum sealed bandage and cast cover.  The cast cover has a vacuum seal and is absolutely waterproof.  3-210-155-6306 or www.xerosox.com for more information.      Driving:  For surgery on the left foot/ankle, you may drive as soon as narcotic medications are stopped.  For surgery on the right foot/ankle, you may drive when you are out of a cast, off pain medications, and you feel secure with braking.      In general, listen to your foot/ankle.  If you have a sudden, dramatic increase in pain that does not resolve after an hour or so, something is wrong - call our office.  If you fall or bump your foot/ankle and have sudden pain that resolves, give it 24 hours before you call.      Many of your questions can be addressed at your 2-week follow-up appointment - please make a list of things to ask us as they come up during your recovery.      If you had a nerve block it is common to have numbness in your leg and foot for up to 30 hours after surgery.  If your leg or foot is still numb more than 30 hours after surgery, please call the office.      FUTURE DENTIST VISITS:  If you have had a total ankle arthroplasty please be advised you must be on antibiotics prior to ANY dental work.  Please call your dentist office ahead of time and make them aware of this as your dentist will be able to order the prescription.  If you have had any other surgery this is not a concern.    If you have any further questions or concerns, please call our office at [phone]

## 2019-08-26 NOTE — BRIEF OP NOTE
[facility]    Brief Operative Note    Pre-operative diagnosis: BUNION RIGHT FOOT  Post-operative diagnosis bunion  Procedure: Procedure(s):  BUNIONECTOMY RIGHT FOOT  Surgeon: Surgeon(s) and Role:     * Rajni Sanchez MD - Primary     * Sander Butterfield PA-C - Assisting     * Bishop Quintanilla MD - Fellow - Assisting  Anesthesia: General   Estimated blood loss: Less than 10 ml  Drains: None  Specimens: * No specimens in log *  Findings:   expected.  Complications: None.  Implants:    Implant Name Type Inv. Item Serial No.  Lot No. LRB No. Used   IMP SCR SYN CORTEX 1.5X16MM SELF TAP .816 Metallic Hardware/[place] IMP SCR SYN CORTEX 1.5X16MM SELF TAP .816  [employer] [id number] Right 1   IMP SCR SYN CORTEX 2.0X16MM SELF TAP .816 [employer]allic [employer]ware/[place] IMP SCR SYN CORTEX 2.0X16MM SELF TAP .816  [employer] [id number] Right 1

## 2019-08-26 NOTE — ANESTHESIA POSTPROCEDURE EVALUATION
Patient: Ramya Hillman    Procedure(s):  BUNIONECTOMY RIGHT FOOT    Diagnosis:BUNION RIGHT FOOT  Diagnosis Additional Information: No value filed.    Anesthesia Type:  General, LMA    Note:  Anesthesia Post Evaluation    Patient location during evaluation: bedside  Patient participation: Able to fully participate in evaluation  Level of consciousness: awake and alert  Pain management: adequate  Airway patency: patent  Cardiovascular status: acceptable  Respiratory status: acceptable  Hydration status: acceptable  PONV: none and controlled     Anesthetic complications: None          Last vitals:  Vitals:    08/26/19 0930 08/26/19 0945 08/26/19 1037   BP: (!) 152/75 (!) 165/80 (!) 153/80   Pulse: 84 88    Resp: 22 21 16   Temp: 35.8  C (96.4  F)     SpO2: 94% 96% 97%         Electronically Signed By: Rojas Gongora MD  August 26, 2019  2:15 PM

## 2019-09-10 ENCOUNTER — TRANSFERRED RECORDS (OUTPATIENT)
Dept: HEALTH INFORMATION MANAGEMENT | Facility: CLINIC | Age: 74
End: 2019-09-10

## 2019-10-03 ENCOUNTER — HEALTH MAINTENANCE LETTER (OUTPATIENT)
Age: 74
End: 2019-10-03

## 2019-10-08 ENCOUNTER — TRANSFERRED RECORDS (OUTPATIENT)
Dept: HEALTH INFORMATION MANAGEMENT | Facility: CLINIC | Age: 74
End: 2019-10-08

## 2019-11-30 ENCOUNTER — OFFICE VISIT (OUTPATIENT)
Dept: URGENT CARE | Facility: URGENT CARE | Age: 74
End: 2019-11-30
Payer: COMMERCIAL

## 2019-11-30 VITALS
HEART RATE: 96 BPM | OXYGEN SATURATION: 98 % | DIASTOLIC BLOOD PRESSURE: 82 MMHG | SYSTOLIC BLOOD PRESSURE: 130 MMHG | TEMPERATURE: 97.6 F

## 2019-11-30 DIAGNOSIS — L01.00 IMPETIGO: Primary | ICD-10-CM

## 2019-11-30 PROCEDURE — 99213 OFFICE O/P EST LOW 20 MIN: CPT | Performed by: FAMILY MEDICINE

## 2019-11-30 RX ORDER — SULFAMETHOXAZOLE/TRIMETHOPRIM 800-160 MG
1 TABLET ORAL 2 TIMES DAILY
Qty: 14 TABLET | Refills: 0 | Status: SHIPPED | OUTPATIENT
Start: 2019-11-30 | End: 2019-12-10

## 2019-11-30 RX ORDER — CLINDAMYCIN PHOSPHATE 10 UG/ML
LOTION TOPICAL 2 TIMES DAILY
Qty: 60 ML | Refills: 0 | Status: SHIPPED | OUTPATIENT
Start: 2019-11-30 | End: 2019-12-10

## 2019-11-30 NOTE — PROGRESS NOTES
SUBJECTIVE:  Ramya Hillman is a 74 year old female who presents to the clinic today for a rash.  Onset of rash was 2 week(s) ago.   Rash is gradual onset and still present.  Location of the rash: L nare.  Quality/symptoms of rash: painful and red   Symptoms are mild and rash seems to be stable.  Previous history of a similar rash? Yes: impetigo in the nose and has tried mupirocin  Recent exposure history: none known    Associated symptoms include: nothing.    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)      Family history of heart disease      Hyperlipidemia      PVD (peripheral vascular disease) (H)      Tobacco abuse      Current Outpatient Medications   Medication Sig Dispense Refill     aspirin 81 MG tablet Take by mouth daily 1 tablet      Cholecalciferol (VITAMIN D) 1000 UNITS capsule Take 1 capsule by mouth daily       Coenzyme Q10 (CO Q10) 100 MG CAPS Take by mouth daily       ibuprofen (ADVIL/MOTRIN) 600 MG tablet Take 1 tablet (600 mg) by mouth every 6 hours as needed for other (mild and/or inflammatory pain) 12 tablet 0     multivitamin, therapeutic with minerals (MULTI-VITAMIN) TABS Take 1 tablet by mouth daily       Omega-3 Fatty Acids (FISH OIL) 1000 MG CPDR Take by mouth daily       ondansetron (ZOFRAN-ODT) 4 MG ODT tab Take 1-2 tablets (4-8 mg) by mouth every 6 hours as needed for nausea 15 tablet 0     oxyCODONE-acetaminophen (PERCOCET) 5-325 MG tablet Take 1-2 tablets by mouth every 4 hours as needed for moderate to severe pain 30 tablet 0     senna-docusate (SENOKOT-S/PERICOLACE) 8.6-50 MG tablet Take 1-2 tablets by mouth 2 times daily 30 tablet 0     simvastatin (ZOCOR) 20 MG tablet TAKE ONE TABLET BY MOUTH ONCE DAILY AT BEDTIME  90 tablet 3     Social History     Tobacco Use     Smoking status: Former Smoker     Years: 30.00     Types: Cigarettes     Start date: 1990     Last attempt to quit: 2018     Years since quittin.2     Smokeless tobacco: Never Used     Tobacco comment:  2-3 cigs per day off and on   Substance Use Topics     Alcohol use: Yes     Alcohol/week: 0.0 standard drinks     Comment: rare       ROS:  CONSTITUTIONAL:NEGATIVE for fever, chills, change in weight  ENT/MOUTH: NEGATIVE for ear, mouth and throat problems    EXAM:   /82   Pulse 96   Temp 97.6  F (36.4  C) (Tympanic)   SpO2 98%   GENERAL: alert, no acute distress.  SKIN: Rash description:    Distribution: localized  Location: below L nare    Color: red area about 3mm, round with tenderness  EYES: EOMI,  PERRL, conjunctiva clear  NECK: supple, non-tender to palpation, no adenopathy noted  RESP: lungs clear to auscultation - no rales, rhonchi or wheezes  CV: regular rates and rhythm, normal S1 S2, no murmur noted    ASSESSMENT:  1. Impetigo  Trial of changing rx   See pcp in one week to see progress  No culture obtained as no drainage noted  - sulfamethoxazole-trimethoprim (BACTRIM DS/SEPTRA DS) 800-160 MG tablet; Take 1 tablet by mouth 2 times daily  Dispense: 14 tablet; Refill: 0  - clindamycin (CLEOCIN T) 1 % external lotion; Apply topically 2 times daily for 7 days  Dispense: 60 mL; Refill: 0

## 2019-12-09 ENCOUNTER — TRANSFERRED RECORDS (OUTPATIENT)
Dept: HEALTH INFORMATION MANAGEMENT | Facility: CLINIC | Age: 74
End: 2019-12-09

## 2019-12-09 ASSESSMENT — ACTIVITIES OF DAILY LIVING (ADL): CURRENT_FUNCTION: NO ASSISTANCE NEEDED

## 2019-12-10 ENCOUNTER — OFFICE VISIT (OUTPATIENT)
Dept: FAMILY MEDICINE | Facility: CLINIC | Age: 74
End: 2019-12-10
Payer: COMMERCIAL

## 2019-12-10 VITALS
DIASTOLIC BLOOD PRESSURE: 82 MMHG | OXYGEN SATURATION: 100 % | TEMPERATURE: 97.7 F | HEART RATE: 65 BPM | SYSTOLIC BLOOD PRESSURE: 153 MMHG | WEIGHT: 168 LBS | BODY MASS INDEX: 23.52 KG/M2 | HEIGHT: 71 IN

## 2019-12-10 DIAGNOSIS — I25.750 CORONARY ARTERY DISEASE INVOLVING NATIVE ARTERY OF TRANSPLANTED HEART WITH UNSTABLE ANGINA PECTORIS (H): ICD-10-CM

## 2019-12-10 DIAGNOSIS — J04.0 ACUTE LARYNGITIS: ICD-10-CM

## 2019-12-10 DIAGNOSIS — Z00.00 ENCOUNTER FOR ROUTINE ADULT HEALTH EXAMINATION WITHOUT ABNORMAL FINDINGS: Primary | ICD-10-CM

## 2019-12-10 DIAGNOSIS — E55.9 VITAMIN D DEFICIENCY: ICD-10-CM

## 2019-12-10 DIAGNOSIS — Z72.0 NICOTINE ABUSE: ICD-10-CM

## 2019-12-10 DIAGNOSIS — R82.90 NONSPECIFIC FINDING ON EXAMINATION OF URINE: ICD-10-CM

## 2019-12-10 DIAGNOSIS — I73.9 PAD (PERIPHERAL ARTERY DISEASE) (H): ICD-10-CM

## 2019-12-10 DIAGNOSIS — Z13.29 SCREENING FOR HYPOTHYROIDISM: ICD-10-CM

## 2019-12-10 DIAGNOSIS — E78.00 HYPERCHOLESTEROLEMIA: ICD-10-CM

## 2019-12-10 DIAGNOSIS — E78.5 HYPERLIPIDEMIA LDL GOAL <100: ICD-10-CM

## 2019-12-10 LAB
ALBUMIN SERPL-MCNC: 3.9 G/DL (ref 3.4–5)
ALBUMIN UR-MCNC: NEGATIVE MG/DL
ALP SERPL-CCNC: 73 U/L (ref 40–150)
ALT SERPL W P-5'-P-CCNC: 23 U/L (ref 0–50)
ANION GAP SERPL CALCULATED.3IONS-SCNC: 8 MMOL/L (ref 3–14)
APPEARANCE UR: CLEAR
AST SERPL W P-5'-P-CCNC: 21 U/L (ref 0–45)
BILIRUB SERPL-MCNC: 0.5 MG/DL (ref 0.2–1.3)
BILIRUB UR QL STRIP: NEGATIVE
BUN SERPL-MCNC: 23 MG/DL (ref 7–30)
CALCIUM SERPL-MCNC: 8.9 MG/DL (ref 8.5–10.1)
CHLORIDE SERPL-SCNC: 107 MMOL/L (ref 94–109)
CHOLEST SERPL-MCNC: 168 MG/DL
CO2 SERPL-SCNC: 23 MMOL/L (ref 20–32)
COLOR UR AUTO: YELLOW
CREAT SERPL-MCNC: 0.76 MG/DL (ref 0.52–1.04)
DEPRECATED CALCIDIOL+CALCIFEROL SERPL-MC: 38 UG/L (ref 20–75)
ERYTHROCYTE [DISTWIDTH] IN BLOOD BY AUTOMATED COUNT: 13.7 % (ref 10–15)
GFR SERPL CREATININE-BSD FRML MDRD: 77 ML/MIN/{1.73_M2}
GLUCOSE SERPL-MCNC: 94 MG/DL (ref 70–99)
GLUCOSE UR STRIP-MCNC: NEGATIVE MG/DL
HCT VFR BLD AUTO: 42.3 % (ref 35–47)
HDLC SERPL-MCNC: 64 MG/DL
HGB BLD-MCNC: 14 G/DL (ref 11.7–15.7)
HGB UR QL STRIP: NEGATIVE
KETONES UR STRIP-MCNC: NEGATIVE MG/DL
LDLC SERPL CALC-MCNC: 85 MG/DL
LEUKOCYTE ESTERASE UR QL STRIP: NEGATIVE
MCH RBC QN AUTO: 31.7 PG (ref 26.5–33)
MCHC RBC AUTO-ENTMCNC: 33.1 G/DL (ref 31.5–36.5)
MCV RBC AUTO: 96 FL (ref 78–100)
NITRATE UR QL: NEGATIVE
NONHDLC SERPL-MCNC: 104 MG/DL
PH UR STRIP: 6 PH (ref 5–7)
PLATELET # BLD AUTO: 309 10E9/L (ref 150–450)
POTASSIUM SERPL-SCNC: 3.9 MMOL/L (ref 3.4–5.3)
PROT SERPL-MCNC: 7.6 G/DL (ref 6.8–8.8)
RBC # BLD AUTO: 4.42 10E12/L (ref 3.8–5.2)
SODIUM SERPL-SCNC: 138 MMOL/L (ref 133–144)
SOURCE: NORMAL
SP GR UR STRIP: 1.02 (ref 1–1.03)
TRIGL SERPL-MCNC: 94 MG/DL
TSH SERPL DL<=0.005 MIU/L-ACNC: 2.14 MU/L (ref 0.4–4)
UROBILINOGEN UR STRIP-ACNC: 0.2 EU/DL (ref 0.2–1)
WBC # BLD AUTO: 10.2 10E9/L (ref 4–11)

## 2019-12-10 PROCEDURE — 84443 ASSAY THYROID STIM HORMONE: CPT | Performed by: INTERNAL MEDICINE

## 2019-12-10 PROCEDURE — 81003 URINALYSIS AUTO W/O SCOPE: CPT | Performed by: INTERNAL MEDICINE

## 2019-12-10 PROCEDURE — 85027 COMPLETE CBC AUTOMATED: CPT | Performed by: INTERNAL MEDICINE

## 2019-12-10 PROCEDURE — 80053 COMPREHEN METABOLIC PANEL: CPT | Performed by: INTERNAL MEDICINE

## 2019-12-10 PROCEDURE — 82306 VITAMIN D 25 HYDROXY: CPT | Performed by: INTERNAL MEDICINE

## 2019-12-10 PROCEDURE — 36415 COLL VENOUS BLD VENIPUNCTURE: CPT | Performed by: INTERNAL MEDICINE

## 2019-12-10 PROCEDURE — 99397 PER PM REEVAL EST PAT 65+ YR: CPT | Performed by: INTERNAL MEDICINE

## 2019-12-10 PROCEDURE — 80061 LIPID PANEL: CPT | Performed by: INTERNAL MEDICINE

## 2019-12-10 RX ORDER — SIMVASTATIN 20 MG
TABLET ORAL
Qty: 90 TABLET | Refills: 3 | Status: SHIPPED | OUTPATIENT
Start: 2019-12-10 | End: 2019-12-10

## 2019-12-10 RX ORDER — SIMVASTATIN 20 MG
TABLET ORAL
Qty: 90 TABLET | Refills: 3 | Status: SHIPPED | OUTPATIENT
Start: 2019-12-10 | End: 2020-09-17 | Stop reason: ALTCHOICE

## 2019-12-10 ASSESSMENT — MIFFLIN-ST. JEOR: SCORE: 1368.17

## 2019-12-10 ASSESSMENT — ACTIVITIES OF DAILY LIVING (ADL): CURRENT_FUNCTION: NO ASSISTANCE NEEDED

## 2019-12-10 NOTE — PROGRESS NOTES
"SUBJECTIVE:   Ramya Hillman is a 74 year old female who presents for Preventive Visit.      Are you in the first 12 months of your Medicare coverage?  No  Recent Mammo; Normal 12/09/2019 Done @ [facility].  Healthy Habits:     In general, how would you rate your overall health?  Good    Frequency of exercise:  1 day/week    Duration of exercise:  Less than 15 minutes    Do you usually eat at least 4 servings of fruit and vegetables a day, include whole grains    & fiber and avoid regularly eating high fat or \"junk\" foods?  Yes    Taking medications regularly:  Yes    Barriers to taking medications:  None    Medication side effects:  Not applicable    Ability to successfully perform activities of daily living:  No assistance needed    Home Safety:  No safety concerns identified    Hearing Impairment:  No hearing concerns    In the past 6 months, have you been bothered by leaking of urine?  No    In general, how would you rate your overall mental or emotional health?  Good      PHQ-2 Total Score: 0    Additional concerns today:  No    Do you feel safe in your environment? YES    Have you ever done Advance Care Planning? (For example, a Health Directive, POLST, or a discussion with a medical provider or your loved ones about your wishes): No, advance care planning information given to patient to review.  Patient plans to discuss their wishes with loved ones or provider.        Fall risk  Fallen 2 or more times in the past year?: No  Any fall with injury in the past year?: No    Cognitive Screening   1) Repeat 3 items (Leader, Season, Table)    2) Clock draw: NORMAL  3) 3 item recall: Recalls 3 objects  Results: 3 items recalled: COGNITIVE IMPAIRMENT LESS LIKELY    Mini-CogTM Copyright GER[employer] King. Licensed by the author for use in [facility]; reprinted with permission (soob@.edu). All rights reserved.      Do you have sleep apnea, excessive snoring or daytime drowsiness?: no    Reviewed and updated as " needed this visit by clinical staff  Tobacco  Allergies  Meds         Reviewed and updated as needed this visit by Provider        Social History     Tobacco Use     Smoking status: Former Smoker     Years: 30.00     Types: Cigarettes     Start date: 1990     Last attempt to quit: 2018     Years since quittin.3     Smokeless tobacco: Never Used     Tobacco comment:  2-3 cigs per day off and on   Substance Use Topics     Alcohol use: Yes     Alcohol/week: 0.0 standard drinks     Comment: rare     If you drink alcohol do you typically have >3 drinks per day or >7 drinks per week? No    Alcohol Use 12/10/2019   Prescreen: >3 drinks/day or >7 drinks/week? -   Prescreen: >3 drinks/day or >7 drinks/week? No               Current providers sharing in care for this patient include:   Patient Care Team:  Madan Lizama MD as PCP - General (Internal Medicine)  Madan Lizama MD as Assigned PCP    The following health maintenance items are reviewed in Epic and correct as of today:  Health Maintenance   Topic Date Due     HEPATITIS C SCREENING  1945     ADVANCE CARE PLANNING  1945     MEDICARE ANNUAL WELLNESS VISIT  2019     MAMMO SCREENING  2019     FALL RISK ASSESSMENT  2020     COLONOSCOPY  10/15/2023     LIPID  2023     DTAP/TDAP/TD IMMUNIZATION (3 - Td) 2027     DEXA  Completed     PHQ-2  Completed     INFLUENZA VACCINE  Completed     PNEUMOCOCCAL IMMUNIZATION 65+ LOW/MEDIUM RISK  Completed     ZOSTER IMMUNIZATION  Completed     IPV IMMUNIZATION  Aged Out     MENINGITIS IMMUNIZATION  Aged Out     Current Outpatient Medications   Medication Sig Dispense Refill     aspirin 81 MG tablet Take by mouth daily 1 tablet      Cholecalciferol (VITAMIN D) 1000 UNITS capsule Take 1 capsule by mouth daily       Coenzyme Q10 (CO Q10) 100 MG CAPS Take by mouth daily       multivitamin, therapeutic with minerals (MULTI-VITAMIN) TABS Take 1 tablet by mouth daily       Omega-3  "Fatty Acids (FISH OIL) 1000 MG CPDR Take by mouth daily       simvastatin (ZOCOR) 20 MG tablet TAKE ONE TABLET BY MOUTH ONCE DAILY AT BEDTIME 90 tablet 3     Allergies   Allergen Reactions     No Known Allergies          Review of Systems  CONSTITUTIONAL: NEGATIVE for fever, chills, change in weight  INTEGUMENTARY/SKIN: NEGATIVE for worrisome rashes, moles or lesions  EYES: NEGATIVE for vision changes or irritation  ENT/MOUTH: NEGATIVE for ear, mouth and throat problems  RESP: NEGATIVE for significant cough or SOB  BREAST: NEGATIVE for masses, tenderness or discharge  CV: NEGATIVE for chest pain, palpitations or peripheral edema  GI: NEGATIVE for nausea, abdominal pain, heartburn, or change in bowel habits  : NEGATIVE for frequency, dysuria, or hematuria  MUSCULOSKELETAL: NEGATIVE for significant arthralgias or myalgia  NEURO: NEGATIVE for weakness, dizziness or paresthesias  ENDOCRINE: NEGATIVE for temperature intolerance, skin/hair changes  HEME: NEGATIVE for bleeding problems  PSYCHIATRIC: NEGATIVE for changes in mood or affect    OBJECTIVE:   BP (!) 153/82 (BP Location: Right arm, Patient Position: Sitting, Cuff Size: Adult Regular)   Pulse 65   Temp 97.7  F (36.5  C) (Oral)   Ht 1.803 m (5' 11\")   Wt 76.2 kg (168 lb)   SpO2 100%   Breastfeeding No   BMI 23.43 kg/m   Estimated body mass index is 23.43 kg/m  as calculated from the following:    Height as of this encounter: 1.803 m (5' 11\").    Weight as of this encounter: 76.2 kg (168 lb).  Physical Exam  GENERAL APPEARANCE: healthy, alert and no distress  EYES: Eyes grossly normal to inspection, PERRL and conjunctivae and sclerae normal  HENT: ear canals and TM's normal, nose and mouth without ulcers or lesions, oropharynx clear and oral mucous membranes moist  NECK: no adenopathy, no asymmetry, masses, or scars and thyroid normal to palpation  RESP: lungs clear to auscultation - no rales, rhonchi or wheezes  BREAST: normal without masses, tenderness or " "nipple discharge and no palpable axillary masses or adenopathy  CV: regular rate and rhythm, normal S1 S2, no S3 or S4, no murmur, click or rub, no peripheral edema and peripheral pulses strong  ABDOMEN: soft, nontender, no hepatosplenomegaly, no masses and bowel sounds normal  MS: no musculoskeletal defects are noted and gait is age appropriate without ataxia  SKIN: no suspicious lesions or rashes  NEURO: Normal strength and tone, sensory exam grossly normal, mentation intact and speech normal  PSYCH: mentation appears normal and affect normal/bright        ASSESSMENT / PLAN:   1. Encounter for routine adult health examination without abnormal findings    - UA reflex to Microscopic and Culture  - CBC with platelets  - Comprehensive metabolic panel  - Lipid panel reflex to direct LDL Fasting  - Vitamin D Deficiency  - TSH with free T4 reflex    2. PAD (peripheral artery disease) (H)    - CBC with platelets    3. 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 Hyperlipidemia LDL goal <100    - Comprehensive metabolic panel  - Lipid panel reflex to direct LDL Fasting    5. Vitamin D deficiency    - Vitamin D Deficiency    6. Screening for hypothyroidism    - TSH with free T4 reflex    7. Nonspecific finding on examination of urine    - UA reflex to Microscopic and Culture    8. Nicotine abuse      9. Hypercholesterolemia    - simvastatin (ZOCOR) 20 MG tablet; TAKE ONE TABLET BY MOUTH ONCE DAILY AT BEDTIME  Dispense: 90 tablet; Refill: 3    10. Acute laryngitis    - CBC with platelets    COUNSELING:      Estimated body mass index is 23.43 kg/m  as calculated from the following:    Height as of this encounter: 1.803 m (5' 11\").    Weight as of this encounter: 76.2 kg (168 lb).         reports that she quit smoking about 15 months ago. Her smoking use included cigarettes. She started smoking about 29 years ago. She quit after " 30.00 years of use. She has never used smokeless tobacco.      Appropriate preventive services were discussed with this patient, including applicable screening as appropriate for cardiovascular disease, diabetes, osteopenia/osteoporosis, and glaucoma.  As appropriate for age/gender, discussed screening for colorectal cancer, prostate cancer, breast cancer, and cervical cancer. Checklist reviewing preventive services available has been given to the patient.    Reviewed patients plan of care and provided an AVS. The  homero Bui meets the Care Plan requirement. This Care Plan has been established and reviewed with the .    Counseling Resources:  ATP IV Guidelines  Pooled Cohorts Equation Calculator  Breast Cancer Risk Calculator  FRAX Risk Assessment  ICSI Preventive Guidelines  Dietary Guidelines for Americans, 2010  USDA's MyPlate  ASA Prophylaxis  Lung CA Screening    Madan Lizama MD  [facility]    Identified Health Risks:

## 2020-07-30 ENCOUNTER — TRANSFERRED RECORDS (OUTPATIENT)
Dept: HEALTH INFORMATION MANAGEMENT | Facility: CLINIC | Age: 75
End: 2020-07-30

## 2020-08-13 DIAGNOSIS — I73.9 CLAUDICATION (H): Primary | ICD-10-CM

## 2020-08-13 NOTE — PROGRESS NOTES
Exam to be scheduled by [facility] staff only. History of lower extremity Claudication;  1 year follow up to 8/13/19 appointment with Dr. Salinas.  ZOILA w/Exercise  In-Clinic Office visit with [facility].

## 2020-08-27 ENCOUNTER — HOSPITAL ENCOUNTER (OUTPATIENT)
Dept: ULTRASOUND IMAGING | Facility: CLINIC | Age: 75
Discharge: HOME | End: 2020-08-27
Attending: SURGERY | Admitting: SURGERY
Payer: COMMERCIAL

## 2020-08-27 DIAGNOSIS — I73.9 CLAUDICATION (H): ICD-10-CM

## 2020-08-27 PROCEDURE — 93922 UPR/L XTREMITY ART 2 LEVELS: CPT

## 2020-08-31 ENCOUNTER — OFFICE VISIT (OUTPATIENT)
Dept: OTHER | Facility: CLINIC | Age: 75
End: 2020-08-31
Attending: SURGERY
Payer: COMMERCIAL

## 2020-08-31 ENCOUNTER — HOSPITAL ENCOUNTER (OUTPATIENT)
Dept: LAB | Facility: CLINIC | Age: 75
End: 2020-08-31
Attending: SURGERY
Payer: COMMERCIAL

## 2020-08-31 VITALS
BODY MASS INDEX: 23.8 KG/M2 | HEIGHT: 71 IN | HEART RATE: 80 BPM | WEIGHT: 170 LBS | OXYGEN SATURATION: 99 % | DIASTOLIC BLOOD PRESSURE: 82 MMHG | SYSTOLIC BLOOD PRESSURE: 152 MMHG

## 2020-08-31 DIAGNOSIS — E78.5 HYPERLIPIDEMIA LDL GOAL <70: Primary | ICD-10-CM

## 2020-08-31 DIAGNOSIS — I73.9 CLAUDICATION (H): ICD-10-CM

## 2020-08-31 DIAGNOSIS — I70.213 ATHEROSCLER OF NATIVE ARTERY OF BOTH LEGS WITH INTERMIT CLAUDICATION (H): ICD-10-CM

## 2020-08-31 PROCEDURE — G0463 HOSPITAL OUTPT CLINIC VISIT: HCPCS

## 2020-08-31 PROCEDURE — 99204 OFFICE O/P NEW MOD 45 MIN: CPT | Mod: ZP | Performed by: INTERNAL MEDICINE

## 2020-08-31 ASSESSMENT — MIFFLIN-ST. JEOR: SCORE: 1372.24

## 2020-08-31 NOTE — NURSING NOTE
"Ramya Hillman is a 75 year old female who presents for:  Chief Complaint   Patient presents with     Follow Up     ZOILA results         Vitals:    Vitals:    08/31/20 1059   BP: (!) 152/82   BP Location: Right arm   Patient Position: Sitting   Cuff Size: Adult Regular   Pulse: 80   SpO2: 99%   Weight: 170 lb (77.1 kg)   Height: 5' 11\" (1.803 m)       BMI:  Estimated body mass index is 23.71 kg/m  as calculated from the following:    Height as of this encounter: 5' 11\" (1.803 m).    Weight as of this encounter: 170 lb (77.1 kg).    Pain Score:  Data Unavailable        Radha Arvizu LPN      "

## 2020-08-31 NOTE — PROGRESS NOTES
Ramya Hillman is a 75 year old female who is presenting at the current time to discuss her diagnosi(es) of        Claudication (H)  Hyperlipidemia LDL goal <70 .      Ramya Hillman is a 75 year old female who was seen today in consultation regarding bilateral lower leg claudication.  She is a former smoker and who quit smoking all together in 8/2019 after her appointment with Dr. Salinas, who has since moved.  The patient can walk about 4 blocks/15 minutes and then she gets cramping calf pain that resolves with short rest.  This distance is very repeatable. She has improved since last year when she could only walk for 2 blocks and four minutes.  She denies chest pain and no stroke or TIA symptoms.  Currently, she does not feel her lifestyle is severely limited by her symptoms.  She had bunion surgery on the right foot in 8/2019 which healed well. She has RLE digital issues with hammer toes which she may wish to have corrected surgically with Dr. Sanchez. Her ZOILA's are currently  0.56 (had been 0.66 in 8/2019) on the right and 0.95 (had been 0.89 8/2019) on the left, with digit pressures > 60 mmHg bilaterally.  The patient is also denying rest pain and no tissue loss noted.     Review Of Systems  Skin: negative  Eyes: negative  Ears/Nose/Throat: negative  Respiratory: No shortness of breath, dyspnea on exertion, cough, or hemoptysis  Cardiovascular: as above with claudication, no chest pain  Gastrointestinal: negative  Genitourinary: negative  Musculoskeletal: negative  Neurologic: negative  Psychiatric: negative  Hematologic/Lymphatic/Immunologic: negative  Endocrine: negative     PAST MEDICAL HISTORY:                  Past Medical History:   Diagnosis Date     Arthritis 1/01/2000    Hands & feet     Chest pain      Chronic ischemic heart disease      Condyloma     had for5 years, 20 years ago,recurrence 2012 biopsy taken     Coronary artery disease involving native artery of transplanted heart with unstable  angina pectoris (H)      Family history of heart disease      Hyperlipidemia      PVD (peripheral vascular disease) (H)      Tobacco abuse        PAST SURGICAL HISTORY:                  Past Surgical History:   Procedure Laterality Date     BIOPSY  3/1/2018    Mole on back     BUNIONECTOMY Right 2019    Procedure: BUNIONECTOMY RIGHT FOOT;  Surgeon: Rajni Sanchez MD;  Location: [facility] OR     COLONOSCOPY  10/18/2018    1 polyp - all clear     NO HISTORY OF SURGERY       ORTHOPEDIC SURGERY  2012    Rotator Cuff - Shoulder       CURRENT MEDICATIONS:                  Current Outpatient Medications   Medication Sig Dispense Refill     aspirin 81 MG tablet Take by mouth daily 1 tablet      Cholecalciferol (VITAMIN D) 1000 UNITS capsule Take 1 capsule by mouth daily       Coenzyme Q10 (CO Q10) 100 MG CAPS Take by mouth daily       multivitamin, therapeutic with minerals (MULTI-VITAMIN) TABS Take 1 tablet by mouth daily       Omega-3 Fatty Acids (FISH OIL) 1000 MG CPDR Take by mouth daily       simvastatin (ZOCOR) 20 MG tablet TAKE ONE TABLET BY MOUTH ONCE DAILY AT BEDTIME 90 tablet 3       ALLERGIES:                  Allergies   Allergen Reactions     No Known Allergies        SOCIAL HISTORY:                  Social History     Socioeconomic History     Marital status:      Spouse name: Not on file     Number of children: 0     Years of education: Not on file     Highest education level: Not on file   Occupational History     Occupation: Sales   Social Needs     Financial resource strain: Not on file     Food insecurity     Worry: Not on file     Inability: Not on file     Transportation needs     Medical: Not on file     Non-medical: Not on file   Tobacco Use     Smoking status: Former Smoker     Years: 30.00     Types: Cigarettes     Start date: 1990     Last attempt to quit: 2018     Years since quittin.0     Smokeless tobacco: Never Used     Tobacco comment:  2-3 cigs per day off and on    Substance and Sexual Activity     Alcohol use: Yes     Alcohol/week: 0.0 standard drinks     Comment: rare     Drug use: No     Sexual activity: Not Currently     Birth control/protection: Post-menopausal     Comment: postmenopausal   Lifestyle     Physical activity     Days per week: Not on file     Minutes per session: Not on file     Stress: Not on file   Relationships     Social connections     Talks on phone: Not on file     Gets together: Not on file     Attends Taoist service: Not on file     Active member of club or organization: Not on file     Attends meetings of clubs or organizations: Not on file     Relationship status: Not on file     Intimate partner violence     Fear of current or ex partner: Not on file     Emotionally abused: Not on file     Physically abused: Not on file     Forced sexual activity: Not on file   Other Topics Concern      Service Not Asked     Blood Transfusions Not Asked     Caffeine Concern No     Comment: 1 a day. mostly decaf     Occupational Exposure Not Asked     Hobby Hazards Not Asked     Sleep Concern Not Asked     Stress Concern Not Asked     Weight Concern Not Asked     Special Diet Not Asked     Back Care Not Asked     Exercise No     Comment: likes to walk     Bike Helmet Not Asked     Seat Belt Not Asked     Self-Exams Not Asked     Parent/sibling w/ CABG, MI or angioplasty before 65F 55M? Yes   Social History Narrative     Not on file       FAMILY HISTORY:                   Family History   Problem Relation Age of Onset     Cerebrovascular Disease Mother      Coronary Artery Disease Mother              Osteoporosis Mother      Hyperlipidemia Mother      Coronary Artery Disease Father 81             Hyperlipidemia Father      Coronary Artery Disease Brother              Hyperlipidemia Brother      Coronary Artery Disease Sister 52             Hyperlipidemia Sister      Coronary Artery Disease Brother 62     Hyperlipidemia  Brother      Coronary Artery Disease Sister      Hyperlipidemia Sister      Coronary Artery Disease Sister      Hyperlipidemia Sister      Breast Cancer Sister 80             Breast Cancer Niece      Coronary Artery Disease Sister      Coronary Artery Disease Sister          Physical exam Reveals:    O/P: WNL  HEENT: WNL  NECK: No JVD, thyromegaly, or lymphadenopathy  HEART: RRR, no murmurs, gallops, or rubs  LUNGS: CTA bilaterally without rales, wheezes, or rhonchi  GI: NABS, nondistended, nontender, soft  EXT:without cyanosis, clubbing, or edema  NEURO: nonfocal  : no flank tenderness  Peripheral arterial exam:    Rt femoral 2 plus  Lt femoral: 3 plus    Rt pop: NP  Lt Pop: NP    Rt DP: monophasic, NP  Lt DP: 3 plus    Rt PT: monophasic, NP  Lt PT: 2 plus      IR ZOILA US ZOILA DOPPLER NO EXERCISE, 1-2 LEVELS,??? BILAT   2020  8:57 AM      HISTORY: History of lower extremity Claudication; Claudication (H)     COMPARISON: None.     FINDINGS:  Right ZOILA:   DP: 0.56  PT: 0.54.     Left ZOILA:   DP: 0.95   PT: 0.93.     Right Digital brachial index: 0.75.  Left Digital Brachial index: 0.86     Waveforms: High resistance monophasic in the distal right tibial  arteries. Triphasic in the distal left tibial arteries.                                                                      IMPRESSION: Moderate arterial insufficiency in the right lower  extremity.     ZOILA CRITERIA:  >0.95 Normal  0.90 - 0.94 Mild  0.5 - 0.89 Moderate  0.2 - 0.49 Severe  <0.2 Critical     JONAS MATA MD      ULTRASOUND ZOILA DOPPLER WITH EXERCISE BILATERAL   2019 10:00 AM      HISTORY: History of right leg pain with walking. Other specified  symptoms and signs involving the circulatory and respiratory systems.  Pain of right lower leg.     COMPARISON: None.     FINDINGS:  Right ZOILA: 0.66.  Left ZOILA: 0.98.  Right digital brachial index: 0.66, right digital pressure: 117.  Left digital brachial index: 0.89, left digital pressure:  158.     Waveforms: Triphasic in the right femoral, monophasic in the right  popliteal and tibial vessels. Triphasic throughout the left.     Exercise: Patient exercised on a treadmill for 5 minutes at 1.9 miles  per hour and a 10% incline. At 3 minutes and 50 seconds the patient's  right ankle became tired with aching in the right calf. At 4 minutes  and 40 seconds worsening calf cramping.     Right exercise ZOILA: 0.25.  Left exercise ZOILA: 0.72.                                                                      IMPRESSION:   1. Right resting ZOILA shows moderate arterial insufficiency which  becomes severe with exercise.  2. Left resting ZOILA is normal with moderate exercise-induced arterial  insufficiency.    A/P:    (E78.5) Hyperlipidemia LDL goal <70  (primary encounter diagnosis)  Comment: needs the below labs drawn. We will check these now and have a virtual follow up visit in two weeks  Plan: LipoFit by NMR, Hemoglobin A1c, CRP cardiac         risk, Lipoprotein (a)           (I73.9) Claudication (H)  Comment: improving , would likely heal hammer toe correction surgery. Would like to participate in PAD rehab so we will refer her for this; RTC one year with preclinic imaging as below.  Plan: US ZOILA Doppler with Exercise Bilateral, PAD         REHAB REFERRAL             Greater than one half the forty minutes total spent on the pt's visit were spent providing education and counselling to the patient regarding the above matters. Extended time secondary to patient new to me requiring thorough chart and imaging review as well as to answer multiple questions.

## 2020-09-01 ENCOUNTER — HOSPITAL ENCOUNTER (OUTPATIENT)
Dept: LAB | Facility: CLINIC | Age: 75
Discharge: HOME | End: 2020-09-01
Attending: INTERNAL MEDICINE | Admitting: INTERNAL MEDICINE
Payer: COMMERCIAL

## 2020-09-01 DIAGNOSIS — E78.5 HYPERLIPIDEMIA LDL GOAL <70: ICD-10-CM

## 2020-09-01 LAB
CRP SERPL HS-MCNC: 1.7 MG/L
HBA1C MFR BLD: 5.8 % (ref 0–5.6)

## 2020-09-01 PROCEDURE — 80061 LIPID PANEL: CPT | Performed by: INTERNAL MEDICINE

## 2020-09-01 PROCEDURE — 36415 COLL VENOUS BLD VENIPUNCTURE: CPT | Performed by: INTERNAL MEDICINE

## 2020-09-01 PROCEDURE — 83704 LIPOPROTEIN BLD QUAN PART: CPT | Performed by: INTERNAL MEDICINE

## 2020-09-01 PROCEDURE — 83695 ASSAY OF LIPOPROTEIN(A): CPT | Performed by: INTERNAL MEDICINE

## 2020-09-01 PROCEDURE — 86141 C-REACTIVE PROTEIN HS: CPT | Performed by: INTERNAL MEDICINE

## 2020-09-01 PROCEDURE — 83036 HEMOGLOBIN GLYCOSYLATED A1C: CPT | Mod: GZ | Performed by: INTERNAL MEDICINE

## 2020-09-02 LAB — LPA SERPL-MCNC: 35 MG/DL

## 2020-09-03 ENCOUNTER — TRANSFERRED RECORDS (OUTPATIENT)
Dept: HEALTH INFORMATION MANAGEMENT | Facility: CLINIC | Age: 75
End: 2020-09-03

## 2020-09-04 LAB
CHOLEST SERPL-MCNC: 160 MG/DL
HDL SERPL QN: 9.5 NM
HDL SERPL-SCNC: 31.7 UMOL/L
HDLC SERPL-MCNC: 58 MG/DL (ref 40–59)
HLD.LARGE SERPL-SCNC: 9 UMOL/L
LDL SERPL QN: 21.3 NM
LDL SERPL-SCNC: 735 NMOL/L
LDL SMALL SERPL-SCNC: <165 NMOL/L
LDLC SERPL CALC-MCNC: 77 MG/DL
PATHOLOGY STUDY: ABNORMAL
TRIGL SERPL-MCNC: 127 MG/DL (ref 30–149)
VLDL LARGE SERPL-SCNC: 3.3 NMOL/L
VLDL SERPL QN: 48.1 NM

## 2020-09-15 ENCOUNTER — OFFICE VISIT (OUTPATIENT)
Dept: FAMILY MEDICINE | Facility: CLINIC | Age: 75
End: 2020-09-15
Payer: COMMERCIAL

## 2020-09-15 ENCOUNTER — TRANSFERRED RECORDS (OUTPATIENT)
Dept: HEALTH INFORMATION MANAGEMENT | Facility: CLINIC | Age: 75
End: 2020-09-15

## 2020-09-15 VITALS
TEMPERATURE: 97.1 F | BODY MASS INDEX: 23.81 KG/M2 | WEIGHT: 170.1 LBS | OXYGEN SATURATION: 100 % | HEIGHT: 71 IN | SYSTOLIC BLOOD PRESSURE: 130 MMHG | HEART RATE: 75 BPM | DIASTOLIC BLOOD PRESSURE: 80 MMHG

## 2020-09-15 DIAGNOSIS — M21.611 BUNION, RIGHT: ICD-10-CM

## 2020-09-15 DIAGNOSIS — I25.9 CHRONIC ISCHEMIC HEART DISEASE: Primary | ICD-10-CM

## 2020-09-15 DIAGNOSIS — E78.5 HYPERLIPIDEMIA LDL GOAL <100: ICD-10-CM

## 2020-09-15 DIAGNOSIS — I73.9 PVD (PERIPHERAL VASCULAR DISEASE) (H): ICD-10-CM

## 2020-09-15 DIAGNOSIS — Z01.818 PREOP GENERAL PHYSICAL EXAM: ICD-10-CM

## 2020-09-15 DIAGNOSIS — I73.9 PAD (PERIPHERAL ARTERY DISEASE) (H): ICD-10-CM

## 2020-09-15 DIAGNOSIS — Z72.0 NICOTINE ABUSE: ICD-10-CM

## 2020-09-15 LAB
ERYTHROCYTE [DISTWIDTH] IN BLOOD BY AUTOMATED COUNT: 14 % (ref 10–15)
HCT VFR BLD AUTO: 44.7 % (ref 35–47)
HEMOCCULT STL QL IA: NORMAL
HGB BLD-MCNC: 14.9 G/DL (ref 11.7–15.7)
MCH RBC QN AUTO: 31.4 PG (ref 26.5–33)
MCHC RBC AUTO-ENTMCNC: 33.3 G/DL (ref 31.5–36.5)
MCV RBC AUTO: 94 FL (ref 78–100)
PLATELET # BLD AUTO: 274 10E9/L (ref 150–450)
RBC # BLD AUTO: 4.74 10E12/L (ref 3.8–5.2)
WBC # BLD AUTO: 9.2 10E9/L (ref 4–11)

## 2020-09-15 PROCEDURE — 85027 COMPLETE CBC AUTOMATED: CPT | Performed by: INTERNAL MEDICINE

## 2020-09-15 PROCEDURE — 99214 OFFICE O/P EST MOD 30 MIN: CPT | Performed by: INTERNAL MEDICINE

## 2020-09-15 PROCEDURE — 80048 BASIC METABOLIC PNL TOTAL CA: CPT | Performed by: INTERNAL MEDICINE

## 2020-09-15 PROCEDURE — 36415 COLL VENOUS BLD VENIPUNCTURE: CPT | Performed by: INTERNAL MEDICINE

## 2020-09-15 ASSESSMENT — MIFFLIN-ST. JEOR: SCORE: 1372.7

## 2020-09-15 NOTE — PROGRESS NOTES
[address] [address]  Phone: [phone]  Primary Provider: Madan Lizama      PREOPERATIVE EVALUATION:  Today's date: 9/15/2020    Ramya Hillman is a 75 year old female who presents for a preoperative evaluation.    Surgical Information:  Surgery Details 9/15/2020   Surgery/Procedure: Right foot surgery   Surgery Location: [facility]   Surgeon: Dr. Sanchez   Surgery Date: 09/25/2020   Time of Surgery: TBD   Where patient plans to recover: At home with family     Fax number for surgical facility: Note does not need to be faxed, will be available electronically in Epic.  Type of Anesthesia Anticipated: to be determined    Subjective     HPI related to upcoming procedure: 75-year-old white female with history of chronic ischemic heart disease which is well controlled and a history of PAD of the right lower extremity, hyperlipidemia had right bunionectomy 1 year ago and is scheduled for a follow-up surgical treatment of her hammertoes and deformities of her other toes preventing her from ambulating.  Cigarette cessation 1 year ago  Preop Questions 9/15/2020   1. Have you ever had a heart attack or stroke? No   2. Have you ever had surgery on your heart or blood vessels, such as a stent placement, a coronary artery bypass, or surgery on an artery in your head, neck, heart, or legs? No   3. Do you have chest pain with activity? No   4. Do you have a history of  heart failure? No   5. Do you currently have a cold, bronchitis or symptoms of other infection? No   6. Do you have a cough, shortness of breath, or wheezing? No   7. Do you or anyone in your family have previous history of blood clots? No   8. Do you or does anyone in your family have a serious bleeding problem such as prolonged bleeding following surgeries or cuts? No   9. Have you ever had problems with anemia or been told to take iron pills? No   10. Have you had any abnormal blood loss  such as black, tarry or bloody stools, or abnormal vaginal bleeding? No   11. Have you ever had a blood transfusion? No   Are you willing to have a blood transfusion if it is medically needed before, during, or after your surgery? Yes   13. Have you or any of your relatives ever had problems with anesthesia? No   14. Do you have sleep apnea, excessive snoring or daytime drowsiness? No   15. Do you have any artifical heart valves or other implanted medical devices like a pacemaker, defibrillator, or continuous glucose monitor? No   16. Do you have artificial joints? No   17. Are you allergic to latex? No   18. Is there any chance that you may be pregnant? No     Patient does not have a Health Care Directive or Living Will:     RX monitoring program ([place]) reviewed:         Review of Systems  Constitutional, neuro, ENT, endocrine, pulmonary, cardiac, gastrointestinal, genitourinary, musculoskeletal, integument and psychiatric systems are negative, except as otherwise noted.    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)      Priority: Medium     Chronic ischemic heart disease 11/02/2017     Priority: Medium     Painful tongue 05/10/2017     Priority: Medium     Hyperlipidemia LDL goal <100 05/04/2017     Priority: Medium     PVD (peripheral vascular disease) (H)      Priority: Medium     Chest pain      Priority: Medium      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)      Family history of heart disease      Hyperlipidemia      PVD (peripheral vascular disease) (H)      Tobacco abuse      Past Surgical History:   Procedure Laterality  "Date     BIOPSY  3/1/2018    Mole on back     BUNIONECTOMY Right 2019    Procedure: BUNIONECTOMY RIGHT FOOT;  Surgeon: Rajni Sanchez MD;  Location: [facility] OR     COLONOSCOPY  10/18/2018    1 polyp - all clear     NO HISTORY OF SURGERY       ORTHOPEDIC SURGERY  2012    Rotator Cuff - Shoulder     Current Outpatient Medications   Medication Sig Dispense Refill     aspirin 81 MG tablet Take by mouth daily 1 tablet      Cholecalciferol (VITAMIN D) 1000 UNITS capsule Take 1 capsule by mouth daily       Coenzyme Q10 (CO Q10) 100 MG CAPS Take by mouth daily       multivitamin, therapeutic with minerals (MULTI-VITAMIN) TABS Take 1 tablet by mouth daily       Omega-3 Fatty Acids (FISH OIL) 1000 MG CPDR Take by mouth daily       simvastatin (ZOCOR) 20 MG tablet TAKE ONE TABLET BY MOUTH ONCE DAILY AT BEDTIME 90 tablet 3       Allergies   Allergen Reactions     No Known Allergies         Social History     Tobacco Use     Smoking status: Former Smoker     Years: 30.00     Types: Cigarettes     Start date: 1990     Last attempt to quit: 2018     Years since quittin.0     Smokeless tobacco: Never Used     Tobacco comment:  2-3 cigs per day off and on   Substance Use Topics     Alcohol use: Yes     Alcohol/week: 0.0 standard drinks     Comment: rare       History   Drug Use No            Objective   BP (!) 141/76 (BP Location: Right arm, Patient Position: Sitting, Cuff Size: Adult Regular)   Pulse 75   Temp 97.1  F (36.2  C) (Temporal)   Ht 1.803 m (5' 11\")   Wt 77.2 kg (170 lb 1.6 oz)   SpO2 100%   BMI 23.72 kg/m    Physical Exam    GENERAL APPEARANCE: healthy, alert and no distress     EYES: EOMI, PERRL     HENT: ear canals and TM's normal and nose and mouth without ulcers or lesions     NECK: no adenopathy, no asymmetry, masses, or scars and thyroid normal to palpation     RESP: lungs clear to auscultation - no rales, rhonchi or wheezes     CV: regular rates and rhythm, normal S1 S2, no S3 or S4 " and no murmur, click or rub     ABDOMEN:  soft, nontender, no HSM or masses and bowel sounds normal     MS: extremities normal- no gross deformities noted, no evidence of inflammation in joints, FROM in all extremities.     SKIN: no suspicious lesions or rashes     NEURO: Normal strength and tone, sensory exam grossly normal, mentation intact and speech normal     PSYCH: mentation appears normal. and affect normal/bright     LYMPHATICS: No cervical adenopathy    Recent Labs   Lab Test 09/01/20  0705 12/10/19  1020 08/19/19  1019   HGB  --  14.0 14.4   PLT  --  309 280   NA  --  138 142   POTASSIUM  --  3.9 4.0   CR  --  0.76 0.82   A1C 5.8*  --   --         PRE-OP Diagnostics:  Labs pending at this time.  Results will be reviewed when available.  No EKG required for low risk surgery (cataract, skin procedure, breast biopsy, etc).         Assessment & Plan   The proposed surgical procedure is considered LOW risk.    REVISED CARDIAC RISK INDEX  The patient has the following serious cardiovascular risks for perioperative complications:  No serious cardiac risks = 0 points    INTERPRETATION: 0 points: Class I (very low risk - 0.4% complication rate)       1. Chronic ischemic heart disease      2. PVD (peripheral vascular disease) (H)      3. Hyperlipidemia LDL goal <100      4. PAD (peripheral artery disease) (H)      5. Bunion, right      6. Nicotine abuse      7. Preop general physical exam    - CBC with platelets  - Basic metabolic panel    The patient has the following additional risks and recommendations for perioperative complications:         MEDICATION INSTRUCTIONS:  Patient is to take all scheduled medications on the day of surgery    RECOMMENDATION:  APPROVAL GIVEN to proceed with proposed procedure, without further diagnostic evaluation.    No follow-ups on file.    Signed Electronically by: Madan Lizama MD    Copy of this evaluation report is provided to requesting physician.    Preop Kaycee VYAS  [facility] Preop Guidelines    Revised Cardiac Risk Index

## 2020-09-16 LAB
ANION GAP SERPL CALCULATED.3IONS-SCNC: 8 MMOL/L (ref 3–14)
BUN SERPL-MCNC: 18 MG/DL (ref 7–30)
CALCIUM SERPL-MCNC: 9.5 MG/DL (ref 8.5–10.1)
CHLORIDE SERPL-SCNC: 106 MMOL/L (ref 94–109)
CO2 SERPL-SCNC: 24 MMOL/L (ref 20–32)
CREAT SERPL-MCNC: 0.87 MG/DL (ref 0.52–1.04)
GFR SERPL CREATININE-BSD FRML MDRD: 66 ML/MIN/{1.73_M2}
GLUCOSE SERPL-MCNC: 91 MG/DL (ref 70–99)
POTASSIUM SERPL-SCNC: 4.4 MMOL/L (ref 3.4–5.3)
SODIUM SERPL-SCNC: 138 MMOL/L (ref 133–144)

## 2020-09-17 ENCOUNTER — VIRTUAL VISIT (OUTPATIENT)
Dept: OTHER | Facility: CLINIC | Age: 75
End: 2020-09-17
Attending: INTERNAL MEDICINE
Payer: COMMERCIAL

## 2020-09-17 DIAGNOSIS — E78.5 HYPERLIPIDEMIA LDL GOAL <70: ICD-10-CM

## 2020-09-17 DIAGNOSIS — I73.9 CLAUDICATION (H): ICD-10-CM

## 2020-09-17 PROCEDURE — 99214 OFFICE O/P EST MOD 30 MIN: CPT | Mod: ZP | Performed by: INTERNAL MEDICINE

## 2020-09-17 RX ORDER — ROSUVASTATIN CALCIUM 40 MG/1
40 TABLET, COATED ORAL DAILY
Qty: 90 TABLET | Refills: 3 | Status: SHIPPED | OUTPATIENT
Start: 2020-09-17 | End: 2021-09-15

## 2020-09-17 NOTE — PROGRESS NOTES
Ramya Hillman is a 75 year old female who is presenting at the current time to discuss her diagnosi(es) of        Claudication (H)  Hyperlipidemia LDL goal <70     HPI:     Ramya Hillman is a 75 year old female who was seen today in consultation regarding bilateral lower leg claudication.  She is a former smoker and who quit smoking all together in 8/2019 after her appointment with Dr. Salinas, who has since moved.  The patient can walk about 4 blocks/15 minutes and then she gets cramping calf pain that resolves with short rest. This distance is very repeatable. She has improved since last year when she could only walk for 2 blocks and four minutes.  She denies chest pain and no stroke or TIA symptoms. Currently, she does not feel her lifestyle is severely limited by her symptoms.  She had bunion surgery on the right foot in 8/2019 which healed well. She has RLE digital issues with hammer toes which she may wish to have corrected surgically with Dr. Sanchez. Her ZOILA's are currently  0.56 (had been 0.66 in 8/2019) on the right and 0.95 (had been 0.89 8/2019) on the left, with digit pressures > 60 mmHg bilaterally.  The patient is also denying rest pain and no tissue loss noted.     She is on simvastatin 20 mg daily. She is not at LDL goal < 70.      Review Of Systems  Skin: negative  Eyes: negative  Ears/Nose/Throat: negative  Respiratory: No shortness of breath, dyspnea on exertion, cough, or hemoptysis  Cardiovascular: negative  Gastrointestinal: negative  Genitourinary: negative  Musculoskeletal: negative  Neurologic: negative  Psychiatric: negative  Hematologic/Lymphatic/Immunologic: negative  Endocrine: negative       PAST MEDICAL HISTORY:                  Past Medical History:   Diagnosis Date     Arthritis 1/01/2000    Hands & feet     Chest pain      Chronic ischemic heart disease      Condyloma     had for5 years, 20 years ago,recurrence 2012 biopsy taken     Coronary artery disease involving native artery  of transplanted heart with unstable angina pectoris (H)      Family history of heart disease      Hyperlipidemia      PVD (peripheral vascular disease) (H)      Tobacco abuse        PAST SURGICAL HISTORY:                  Past Surgical History:   Procedure Laterality Date     BIOPSY  3/1/2018    Mole on back     BUNIONECTOMY Right 2019    Procedure: BUNIONECTOMY RIGHT FOOT;  Surgeon: Rajni Sanchez MD;  Location: [facility]     COLONOSCOPY  10/18/2018    1 polyp - all clear     NO HISTORY OF SURGERY       ORTHOPEDIC SURGERY  2012    Rotator Cuff - Shoulder       CURRENT MEDICATIONS:                  Current Outpatient Medications   Medication Sig Dispense Refill     aspirin 81 MG tablet Take by mouth daily 1 tablet      Cholecalciferol (VITAMIN D) 1000 UNITS capsule Take 1 capsule by mouth daily       Coenzyme Q10 (CO Q10) 100 MG CAPS Take by mouth daily       multivitamin, therapeutic with minerals (MULTI-VITAMIN) TABS Take 1 tablet by mouth daily       Omega-3 Fatty Acids (FISH OIL) 1000 MG CPDR Take by mouth daily       simvastatin (ZOCOR) 20 MG tablet TAKE ONE TABLET BY MOUTH ONCE DAILY AT BEDTIME 90 tablet 3       ALLERGIES:                  Allergies   Allergen Reactions     No Known Allergies        SOCIAL HISTORY:                  Social History     Socioeconomic History     Marital status:      Spouse name: Not on file     Number of children: 0     Years of education: Not on file     Highest education level: Not on file   Occupational History     Occupation: Sales   Social Needs     Financial resource strain: Not on file     Food insecurity     Worry: Not on file     Inability: Not on file     Transportation needs     Medical: Not on file     Non-medical: Not on file   Tobacco Use     Smoking status: Former Smoker     Years: 30.00     Types: Cigarettes     Start date: 1990     Last attempt to quit: 2018     Years since quittin.0     Smokeless tobacco: Never Used     Tobacco  comment:  2-3 cigs per day off and on   Substance and Sexual Activity     Alcohol use: Yes     Alcohol/week: 0.0 standard drinks     Comment: rare     Drug use: No     Sexual activity: Not Currently     Birth control/protection: Post-menopausal     Comment: postmenopausal   Lifestyle     Physical activity     Days per week: Not on file     Minutes per session: Not on file     Stress: Not on file   Relationships     Social connections     Talks on phone: Not on file     Gets together: Not on file     Attends [employer] service: Not on file     Active member of club or organization: Not on file     Attends meetings of clubs or organizations: Not on file     Relationship status: Not on file     Intimate partner violence     Fear of current or ex partner: Not on file     Emotionally abused: Not on file     Physically abused: Not on file     Forced sexual activity: Not on file   Other Topics Concern      Service Not Asked     Blood Transfusions Not Asked     Caffeine Concern No     Comment: 1 a day. mostly decaf     Occupational Exposure Not Asked     Hobby Hazards Not Asked     Sleep Concern Not Asked     Stress Concern Not Asked     Weight Concern Not Asked     Special Diet Not Asked     Back Care Not Asked     Exercise No     Comment: likes to walk     Bike Helmet Not Asked     Seat Belt Not Asked     Self-Exams Not Asked     Parent/sibling w/ CABG, MI or angioplasty before 65F 55M? Yes   Social History Narrative     Not on file       FAMILY HISTORY:                   Family History   Problem Relation Age of Onset     Cerebrovascular Disease Mother      Coronary Artery Disease Mother              Osteoporosis Mother      Hyperlipidemia Mother      Coronary Artery Disease Father 81             Hyperlipidemia Father      Coronary Artery Disease Brother              Hyperlipidemia Brother      Coronary Artery Disease Sister 52             Hyperlipidemia Sister      Coronary Artery  Disease Brother 62     Hyperlipidemia Brother      Coronary Artery Disease Sister      Hyperlipidemia Sister      Coronary Artery Disease Sister      Hyperlipidemia Sister      Breast Cancer Sister 80             Breast Cancer Niece      Coronary Artery Disease Sister      Coronary Artery Disease Sister        Physical exam was not performed as it was a CV[employer] mandate video visit.      Component      Latest Ref Rng & Units 12/10/2019 2020   Total Cholesterol      <=199 mg/dL  160   Triglycerides      30 - 149 mg/dL 94 127   HDL Cholesterol      40 - 59 mg/dL  58   LDL Cholesterol      <=129 mg/dL  77   HDL Size      >=8.9 nm  9.5   VLDL Size      <=46.7 nm  48.1 (H)   LDL Particle Size      >=20.7 nm  21.3   Lge HDL Particle number      >=4.2 umol/L  9.0   HDL Particle Number NMR      >=33.0 umol/L  31.7 (L)   Lge VLDL Part number      <=2.7 nmol/L  3.3 (H)   Small LDL Particle number      <=634 nmol/L  <165   LDL Particle Number      <=1,135 nmol/L  735   EER LipoFit by NMR        SEE NOTE   Cholesterol      <200 mg/dL 168    HDL Cholesterol      >49 mg/dL 64    LDL Cholesterol Calculated      <100 mg/dL 85    Non HDL Cholesterol      <130 mg/dL 104    Lipoprotein (a)      <=29 mg/dL  35 (H)   CRP Cardiac Risk      mg/L  1.7   Hemoglobin A1C      0 - 5.6 %  5.8 (H)       IR ZOILA US ZOILA DOPPLER NO EXERCISE, 1-2 LEVELS,??? BILAT   2020  8:57 AM      HISTORY: History of lower extremity Claudication; Claudication (H)     COMPARISON: None.     FINDINGS:  Right ZOILA:   DP: 0.56  PT: 0.54.     Left ZOILA:   DP: 0.95   PT: 0.93.     Right Digital brachial index: 0.75.  Left Digital Brachial index: 0.86     Waveforms: High resistance monophasic in the distal right tibial  arteries. Triphasic in the distal left tibial arteries.                                                                      IMPRESSION: Moderate arterial insufficiency in the right lower  extremity.     ZOILA CRITERIA:  >0.95 Normal  0.90 - 0.94  Mild  0.5 - 0.89 Moderate  0.2 - 0.49 Severe  <0.2 Critical     JONAS MATA MD.    A/P:        (E78.5) Hyperlipidemia LDL goal <70  (primary encounter diagnosis)  Comment: Stop simvastatin as LDL not at goal. Start Crestor 40. Needs the below labs drawn in three months. RTC two weeks later.  Plan: LipoFit by NMR, Hemoglobin A1c, CRP cardiac         risk, Lipoprotein (a)            (I73.9) Claudication (H)  Comment: improving , would likely heal hammer toe correction surgery. Would like to participate in PAD rehab so she is referred for this after recovery from hammer toe correction.; She will start this once healed form hammer toe surgery.  RTC one year with preclinic imaging as below.  Plan: US ZOILA Doppler with Exercise Bilateral, PAD         REHAB REFERRAL; RTC 6 months for stress ZOILA f/u.        TBIs indicate she will heal hammer toe correction surgery.          Greater than one half the twenty five minutes total spent on the pt's visit were spent providing education and counselling to the patient regarding the above matters.    Video start: 14:20  Video end:   14:50

## 2020-09-17 NOTE — PROGRESS NOTES
"Ramya Hillman is a 75 year old female who is being evaluated via a billable video visit.      The patient has been notified of following:     \"This video visit will be conducted via a call between you and your physician/provider. We have found that certain health care needs can be provided without the need for an in-person physical exam.  This service lets us provide the care you need with a video conversation.  If a prescription is necessary we can send it directly to your pharmacy.  If lab work is needed we can place an order for that and you can then stop by our lab to have the test done at a later time.    Video visits are billed at different rates depending on your insurance coverage.  Please reach out to your insurance provider with any questions.    If during the course of the call the physician/provider feels a video visit is not appropriate, you will not be charged for this service.\"    Patient has given verbal consent for Video visit? Yes, mobile phone number [phone]    How would you like to obtain your AVS? Mailed     Will anyone else be joining your video visit? No     "

## 2020-09-17 NOTE — NURSING NOTE
Last two office notes faxed to Dr. Sanchez at [facility] Fax [phone].    Sophia LIVINGSTON, RN    [facility]  Office: [phone]  Fax: [phone]

## 2020-10-08 ENCOUNTER — TRANSFERRED RECORDS (OUTPATIENT)
Dept: HEALTH INFORMATION MANAGEMENT | Facility: CLINIC | Age: 75
End: 2020-10-08

## 2020-12-08 DIAGNOSIS — I73.9 CLAUDICATION (H): ICD-10-CM

## 2020-12-08 DIAGNOSIS — E78.5 HYPERLIPIDEMIA LDL GOAL <70: ICD-10-CM

## 2020-12-08 LAB — CRP SERPL HS-MCNC: 1.3 MG/L

## 2020-12-08 PROCEDURE — 86141 C-REACTIVE PROTEIN HS: CPT | Performed by: INTERNAL MEDICINE

## 2020-12-08 PROCEDURE — 83695 ASSAY OF LIPOPROTEIN(A): CPT | Mod: 90 | Performed by: INTERNAL MEDICINE

## 2020-12-08 PROCEDURE — 36415 COLL VENOUS BLD VENIPUNCTURE: CPT | Performed by: INTERNAL MEDICINE

## 2020-12-08 PROCEDURE — 99000 SPECIMEN HANDLING OFFICE-LAB: CPT | Performed by: INTERNAL MEDICINE

## 2020-12-08 PROCEDURE — 83704 LIPOPROTEIN BLD QUAN PART: CPT | Mod: 90 | Performed by: INTERNAL MEDICINE

## 2020-12-08 PROCEDURE — 80061 LIPID PANEL: CPT | Mod: 90 | Performed by: INTERNAL MEDICINE

## 2020-12-09 LAB — LPA SERPL-MCNC: 46 MG/DL

## 2020-12-10 ENCOUNTER — TRANSFERRED RECORDS (OUTPATIENT)
Dept: HEALTH INFORMATION MANAGEMENT | Facility: CLINIC | Age: 75
End: 2020-12-10

## 2020-12-10 LAB
CHOLEST SERPL-MCNC: 125 MG/DL
HDL SERPL QN: 9.5 NM
HDL SERPL-SCNC: 34.6 UMOL/L
HDLC SERPL-MCNC: 64 MG/DL (ref 40–59)
HLD.LARGE SERPL-SCNC: 9.6 UMOL/L
LDL SERPL QN: 21.2 NM
LDL SERPL-SCNC: 522 NMOL/L
LDL SMALL SERPL-SCNC: <165 NMOL/L
LDLC SERPL CALC-MCNC: 44 MG/DL
PATHOLOGY STUDY: ABNORMAL
TRIGL SERPL-MCNC: 85 MG/DL (ref 30–149)
VLDL LARGE SERPL-SCNC: 2.1 NMOL/L
VLDL SERPL QN: 49.4 NM

## 2020-12-14 ENCOUNTER — TELEPHONE (OUTPATIENT)
Dept: FAMILY MEDICINE | Facility: CLINIC | Age: 75
End: 2020-12-14

## 2020-12-14 NOTE — TELEPHONE ENCOUNTER
Please abstract the following data from this visit with this patient into the appropriate field in Epic:    Tests that can be patient reported without a hard copy:    Mammogram done on this date: 12/10/2020 (approximately), by this group: [facility], results were normal.     Other Tests found in the patient's chart through Chart Review/Care Everywhere:        Note to Abstraction: If this section is blank, no results were found via Chart Review/Care Everywhere.      Ilda Mauro MA

## 2020-12-15 ENCOUNTER — TELEPHONE (OUTPATIENT)
Dept: FAMILY MEDICINE | Facility: CLINIC | Age: 75
End: 2020-12-15

## 2020-12-15 NOTE — TELEPHONE ENCOUNTER
Panel Management Review      Patient has the following on her problem list: None      Composite cancer screening  Chart review shows that this patient is due/due soon for the following Mammogram  Summary:    Patient is due/failing the following:   MAMMOGRAM    Action needed:   Routed to provider for review.    Type of outreach:    None, routed to provider for review.    Questions for provider review:    None                                                                                                                                    Carlitos Quevedo CMA on 12/15/2020 at 11:06 AM       Chart routed to Care Team .

## 2020-12-17 ENCOUNTER — OFFICE VISIT (OUTPATIENT)
Dept: FAMILY MEDICINE | Facility: CLINIC | Age: 75
End: 2020-12-17
Payer: COMMERCIAL

## 2020-12-17 DIAGNOSIS — E78.5 HYPERLIPIDEMIA LDL GOAL <100: ICD-10-CM

## 2020-12-17 DIAGNOSIS — Z13.29 SCREENING FOR HYPOTHYROIDISM: ICD-10-CM

## 2020-12-17 DIAGNOSIS — J04.0 ACUTE LARYNGITIS: ICD-10-CM

## 2020-12-17 DIAGNOSIS — Z00.00 ENCOUNTER FOR ROUTINE ADULT HEALTH EXAMINATION WITHOUT ABNORMAL FINDINGS: Primary | ICD-10-CM

## 2020-12-17 DIAGNOSIS — I25.9 CHRONIC ISCHEMIC HEART DISEASE: ICD-10-CM

## 2020-12-17 DIAGNOSIS — I73.9 PAD (PERIPHERAL ARTERY DISEASE) (H): ICD-10-CM

## 2020-12-17 DIAGNOSIS — E55.9 VITAMIN D DEFICIENCY: ICD-10-CM

## 2020-12-17 DIAGNOSIS — E78.00 HYPERCHOLESTEROLEMIA: ICD-10-CM

## 2020-12-17 DIAGNOSIS — R73.9 HYPERGLYCEMIA: ICD-10-CM

## 2020-12-17 LAB
ALBUMIN SERPL-MCNC: 3.9 G/DL (ref 3.4–5)
ALBUMIN UR-MCNC: NEGATIVE MG/DL
ALP SERPL-CCNC: 72 U/L (ref 40–150)
ALT SERPL W P-5'-P-CCNC: 29 U/L (ref 0–50)
ANION GAP SERPL CALCULATED.3IONS-SCNC: 5 MMOL/L (ref 3–14)
APPEARANCE UR: CLEAR
AST SERPL W P-5'-P-CCNC: 22 U/L (ref 0–45)
BILIRUB SERPL-MCNC: 0.5 MG/DL (ref 0.2–1.3)
BILIRUB UR QL STRIP: NEGATIVE
BUN SERPL-MCNC: 20 MG/DL (ref 7–30)
CALCIUM SERPL-MCNC: 9.1 MG/DL (ref 8.5–10.1)
CHLORIDE SERPL-SCNC: 108 MMOL/L (ref 94–109)
CO2 SERPL-SCNC: 25 MMOL/L (ref 20–32)
COLOR UR AUTO: YELLOW
CREAT SERPL-MCNC: 1.06 MG/DL (ref 0.52–1.04)
ERYTHROCYTE [DISTWIDTH] IN BLOOD BY AUTOMATED COUNT: 14.3 % (ref 10–15)
GFR SERPL CREATININE-BSD FRML MDRD: 52 ML/MIN/{1.73_M2}
GLUCOSE SERPL-MCNC: 90 MG/DL (ref 70–99)
GLUCOSE UR STRIP-MCNC: NEGATIVE MG/DL
HBA1C MFR BLD: 5.9 % (ref 0–5.6)
HCT VFR BLD AUTO: 41 % (ref 35–47)
HGB BLD-MCNC: 13.4 G/DL (ref 11.7–15.7)
HGB UR QL STRIP: NEGATIVE
KETONES UR STRIP-MCNC: NEGATIVE MG/DL
LEUKOCYTE ESTERASE UR QL STRIP: NEGATIVE
MCH RBC QN AUTO: 30.7 PG (ref 26.5–33)
MCHC RBC AUTO-ENTMCNC: 32.7 G/DL (ref 31.5–36.5)
MCV RBC AUTO: 94 FL (ref 78–100)
NITRATE UR QL: NEGATIVE
PH UR STRIP: 6 PH (ref 5–7)
PLATELET # BLD AUTO: 264 10E9/L (ref 150–450)
POTASSIUM SERPL-SCNC: 4.3 MMOL/L (ref 3.4–5.3)
PROT SERPL-MCNC: 7.6 G/DL (ref 6.8–8.8)
RBC # BLD AUTO: 4.37 10E12/L (ref 3.8–5.2)
SODIUM SERPL-SCNC: 138 MMOL/L (ref 133–144)
SOURCE: NORMAL
SP GR UR STRIP: 1.01 (ref 1–1.03)
TSH SERPL DL<=0.005 MIU/L-ACNC: 3.33 MU/L (ref 0.4–4)
UROBILINOGEN UR STRIP-ACNC: 0.2 EU/DL (ref 0.2–1)
WBC # BLD AUTO: 6.8 10E9/L (ref 4–11)

## 2020-12-17 PROCEDURE — 99213 OFFICE O/P EST LOW 20 MIN: CPT | Mod: 25 | Performed by: INTERNAL MEDICINE

## 2020-12-17 PROCEDURE — 83036 HEMOGLOBIN GLYCOSYLATED A1C: CPT | Performed by: INTERNAL MEDICINE

## 2020-12-17 PROCEDURE — 85027 COMPLETE CBC AUTOMATED: CPT | Performed by: INTERNAL MEDICINE

## 2020-12-17 PROCEDURE — 80053 COMPREHEN METABOLIC PANEL: CPT | Performed by: INTERNAL MEDICINE

## 2020-12-17 PROCEDURE — 84443 ASSAY THYROID STIM HORMONE: CPT | Performed by: INTERNAL MEDICINE

## 2020-12-17 PROCEDURE — 82306 VITAMIN D 25 HYDROXY: CPT | Performed by: INTERNAL MEDICINE

## 2020-12-17 PROCEDURE — 99397 PER PM REEVAL EST PAT 65+ YR: CPT | Performed by: INTERNAL MEDICINE

## 2020-12-17 PROCEDURE — 81003 URINALYSIS AUTO W/O SCOPE: CPT | Performed by: INTERNAL MEDICINE

## 2020-12-17 PROCEDURE — 36415 COLL VENOUS BLD VENIPUNCTURE: CPT | Performed by: INTERNAL MEDICINE

## 2020-12-17 ASSESSMENT — ACTIVITIES OF DAILY LIVING (ADL): CURRENT_FUNCTION: NO ASSISTANCE NEEDED

## 2020-12-17 ASSESSMENT — MIFFLIN-ST. JEOR: SCORE: 1349.57

## 2020-12-17 NOTE — PROGRESS NOTES
"SUBJECTIVE:   Ramya Hillman is a 75 year old female who presents for Preventive Visit.      Patient has been advised of split billing requirements and indicates understanding: Yes   Are you in the first 12 months of your Medicare coverage?  No    Healthy Habits:     In general, how would you rate your overall health?  Very good    Frequency of exercise:  2-3 days/week    Duration of exercise:  30-45 minutes    Do you usually eat at least 4 servings of fruit and vegetables a day, include whole grains    & fiber and avoid regularly eating high fat or \"junk\" foods?  Yes    Taking medications regularly:  Yes    Barriers to taking medications:  None    Medication side effects:  None    Ability to successfully perform activities of daily living:  No assistance needed    Home Safety:  No safety concerns identified    Hearing Impairment:  No hearing concerns    In the past 6 months, have you been bothered by leaking of urine?  No    In general, how would you rate your overall mental or emotional health?  Very good      PHQ-2 Total Score: 0    Additional concerns today:  No    Do you feel safe in your environment? Yes    Have you ever done Advance Care Planning? (For example, a Health Directive, POLST, or a discussion with a medical provider or your loved ones about your wishes): No, advance care planning information given to patient to review.  Patient plans to discuss their wishes with loved ones or provider.        Fall risk  Fallen 2 or more times in the past year?: No  Any fall with injury in the past year?: No    Cognitive Screening   1) Repeat 3 items (Leader, Season, Table)    2) Clock draw: NORMAL  3) 3 item recall: Recalls 3 objects  Results: 3 items recalled: COGNITIVE IMPAIRMENT LESS LIKELY    Mini-CogTM Copyright GERA King. Licensed by the author for use in [facility]; reprinted with permission (cameron@.[email or web address]). All rights reserved.      Do you have sleep apnea, excessive snoring or daytime " drowsiness?: no  PAD  Regular activity including her walking program without claudication.  Her feet are chronically cold, no ulcerations or sores    Recent change in hyperlipidemia management switching to Crestor.  Reviewed and updated as needed this visit by clinical staff  Tobacco  Allergies  Meds  Problems  Med Hx  Surg Hx           Reviewed and updated as needed this visit by Provider                Social History     Tobacco Use     Smoking status: Former Smoker     Years: 30.00     Types: Cigarettes     Start date: 1990     Quit date: 2018     Years since quittin.3     Smokeless tobacco: Never Used     Tobacco comment:  2-3 cigs per day off and on   Substance Use Topics     Alcohol use: Yes     Alcohol/week: 0.0 standard drinks     Comment: rare     If you drink alcohol do you typically have >3 drinks per day or >7 drinks per week? No    Alcohol Use 12/10/2019   Prescreen: >3 drinks/day or >7 drinks/week? -   Prescreen: >3 drinks/day or >7 drinks/week? No               Current providers sharing in care for this patient include:   Patient Care Team:  Madan Lizama MD as PCP - General (Internal Medicine)  Madan Lizama MD as Assigned PCP  Dominik Wolf MD as Assigned Heart and Vascular Provider    The following health maintenance items are reviewed in Epic and correct as of today:  Health Maintenance   Topic Date Due     HEPATITIS C SCREENING  1963     MEDICARE ANNUAL WELLNESS VISIT  12/10/2020     FALL RISK ASSESSMENT  2021     MAMMO SCREENING  12/10/2021     COLORECTAL CANCER SCREENING  10/15/2023     LIPID  12/10/2024     ADVANCE CARE PLANNING  2024     DEXA  09/15/2026     DTAP/TDAP/TD IMMUNIZATION (3 - Td) 2027     PHQ-2  Completed     INFLUENZA VACCINE  Completed     Pneumococcal Vaccine: 65+ Years  Completed     ZOSTER IMMUNIZATION  Completed     Pneumococcal Vaccine: Pediatrics (0 to 5 Years) and At-Risk Patients (6 to 64 Years)  Aged Out      IPV IMMUNIZATION  Aged Out     MENINGITIS IMMUNIZATION  Aged Out     HEPATITIS B IMMUNIZATION  Aged Out     Current Outpatient Medications   Medication Sig Dispense Refill     aspirin 81 MG tablet Take by mouth daily 1 tablet      Cholecalciferol (VITAMIN D) 1000 UNITS capsule Take 1 capsule by mouth daily       Coenzyme Q10 (CO Q10) 100 MG CAPS Take by mouth daily       multivitamin, therapeutic with minerals (MULTI-VITAMIN) TABS Take 1 tablet by mouth daily       Omega-3 Fatty Acids (FISH OIL) 1000 MG CPDR Take by mouth daily       rosuvastatin (CRESTOR) 40 MG tablet Take 1 tablet (40 mg) by mouth daily 90 tablet 3   Calcium supplement  Claritin  Allergies   Allergen Reactions     No Known Allergies          Review of Systems  CONSTITUTIONAL: NEGATIVE for fever, chills, change in weight  INTEGUMENTARY/SKIN: NEGATIVE for worrisome rashes, moles or lesions  EYES: NEGATIVE for vision changes or irritation,  Scheduled for ophthalmology follow-up January 2001  ENT/MOUTH: NEGATIVE for ear, mouth and throat problems,  Chronic rhinitis  Back: Followed by chiropractics  RESP: NEGATIVE for significant cough or SOB  BREAST: NEGATIVE for masses, tenderness or discharge  CV: NEGATIVE for chest pain, palpitations or peripheral edema.  Walking approximately 40 minutes with minimal  symptoms of claudication at the 15-minute dwight and is able to continue walking for 40 minutes, no angina, 4 to 7 days/week along with toning activities  GI: NEGATIVE for nausea, abdominal pain, heartburn, or change in bowel habits  : NEGATIVE for frequency, dysuria, or hematuria  MUSCULOSKELETAL: NEGATIVE for significant arthralgias or myalgia  NEURO: NEGATIVE for weakness, dizziness or paresthesias  ENDOCRINE: NEGATIVE for temperature intolerance, skin/hair changes  HEME: NEGATIVE for bleeding problems  PSYCHIATRIC: NEGATIVE for changes in mood or affect    OBJECTIVE:   There were no vitals taken for this visit. Estimated body mass index is 23.72  "kg/m  as calculated from the following:    Height as of 9/15/20: 1.803 m (5' 11\").    Weight as of 9/15/20: 77.2 kg (170 lb 1.6 oz).  Physical Exam BP (!) 145/80 (BP Location: Left arm, Patient Position: Chair, Cuff Size: Adult Large)   Pulse 80   Temp 97  F (36.1  C) (Oral)   Ht 1.803 m (5' 11\")   Wt 74.8 kg (165 lb)   SpO2 99%   Breastfeeding No   BMI 23.01 kg/m      GENERAL APPEARANCE: healthy, alert and no distress  EYES: Eyes grossly normal to inspection, PERRL and conjunctivae and sclerae normal  HENT: ear canals and TM's normal, nose and mouth without ulcers or lesions, oropharynx clear and oral mucous membranes moist  NECK: no adenopathy, no asymmetry, masses, or scars and thyroid normal to palpation  RESP: lungs clear to auscultation - no rales, rhonchi or wheezes  BREAST: normal without masses, tenderness or nipple discharge and no palpable axillary masses or adenopathy  CV: regular rate and rhythm, normal S1 S2, no S3 or S4, no murmur, click or rub, no peripheral edema and peripheral pulses   Palpable right pedal and dorsalis pedis pulses are 1+ left normal  ABDOMEN: soft, nontender, no hepatosplenomegaly, no masses and bowel sounds normal  MS: no musculoskeletal defects are noted and gait is age appropriate without ataxia  SKIN: no suspicious lesions or rashes, the toes are dusky right greater than the left  NEURO: Normal strength and tone, sensory exam grossly normal, mentation intact and speech normal  PSYCH: mentation appears normal and affect normal/bright        ASSESSMENT / PLAN:   1. Encounter for routine adult health examination without abnormal findings    - UA reflex to Microscopic and Culture  - CBC with platelets  - Comprehensive metabolic panel  - Vitamin D Deficiency  - TSH with free T4 reflex    2. PAD (peripheral artery disease) (H)  Lipids, blood pressure at goal, quit smoking cigarettes greater than 1 year ago.  Continue the same reviewed using compression stockings with travel " "and avoiding them routinely because of her PAD  - UA reflex to Microscopic and Culture  - CBC with platelets  - Comprehensive metabolic panel    3. Chronic ischemic heart disease  No anginal symptoms    4. Hyperlipidemia LDL goal <100  Patient is at goal keeping her LDL <70    5. Hypercholesterolemia  See above    6. Screening for hypothyroidism    - TSH with free T4 reflex    7. Vitamin D deficiency    - Vitamin D Deficiency      9. Hyperglycemia    - Hemoglobin A1c    Patient has been advised of split billing requirements and indicates understanding: Yes  COUNSELING:  Reviewed preventive health counseling, as reflected in patient instructions    Estimated body mass index is 23.72 kg/m  as calculated from the following:    Height as of 9/15/20: 1.803 m (5' 11\").    Weight as of 9/15/20: 77.2 kg (170 lb 1.6 oz).        She reports that she quit smoking about 2 years ago. Her smoking use included cigarettes. She started smoking about 30 years ago. She quit after 30.00 years of use. She has never used smokeless tobacco.      Appropriate preventive services were discussed with this patient, including applicable screening as appropriate for cardiovascular disease, diabetes, osteopenia/osteoporosis, and glaucoma.  As appropriate for age/gender, discussed screening for colorectal cancer, prostate cancer, breast cancer, and cervical cancer. Checklist reviewing preventive services available has been given to the patient.    Reviewed patients plan of care and provided an AVS. The Basic Care Plan (routine screening as documented in Health Maintenance) for Ramya meets the Care Plan requirement. This Care Plan has been established and reviewed with the Patient.    Counseling Resources:  ATP IV Guidelines  Pooled Cohorts Equation Calculator  Breast Cancer Risk Calculator  Breast Cancer: Medication to Reduce Risk  FRAX Risk Assessment  ICSI Preventive Guidelines  Dietary Guidelines for Americans, 2010  [employer]'s MyPlate  ASA " Prophylaxis  Lung CA Screening    Madan Lizama MD  [facility]    Identified Health Risks:

## 2020-12-19 LAB — DEPRECATED CALCIDIOL+CALCIFEROL SERPL-MC: 46 UG/L (ref 20–75)

## 2020-12-20 VITALS
DIASTOLIC BLOOD PRESSURE: 80 MMHG | OXYGEN SATURATION: 99 % | HEIGHT: 71 IN | TEMPERATURE: 97 F | SYSTOLIC BLOOD PRESSURE: 130 MMHG | HEART RATE: 80 BPM | WEIGHT: 165 LBS | BODY MASS INDEX: 23.1 KG/M2

## 2020-12-22 ENCOUNTER — VIRTUAL VISIT (OUTPATIENT)
Dept: OTHER | Facility: CLINIC | Age: 75
End: 2020-12-22
Attending: INTERNAL MEDICINE
Payer: COMMERCIAL

## 2020-12-22 DIAGNOSIS — E78.5 HYPERLIPIDEMIA LDL GOAL <70: ICD-10-CM

## 2020-12-22 DIAGNOSIS — I73.9 CLAUDICATION (H): ICD-10-CM

## 2020-12-22 DIAGNOSIS — R73.01 IFG (IMPAIRED FASTING GLUCOSE): Primary | ICD-10-CM

## 2020-12-22 PROCEDURE — 99214 OFFICE O/P EST MOD 30 MIN: CPT | Mod: 95 | Performed by: INTERNAL MEDICINE

## 2020-12-22 RX ORDER — EZETIMIBE 10 MG/1
10 TABLET ORAL DAILY
Qty: 90 TABLET | Refills: 3 | Status: SHIPPED | OUTPATIENT
Start: 2020-12-22 | End: 2021-04-10

## 2020-12-22 NOTE — PROGRESS NOTES
"Ramya Hillman is a 75 year old female who is being evaluated via a billable video visit.      The patient has been notified of following:     \"This video visit will be conducted via a call between you and your physician/provider. We have found that certain health care needs can be provided without the need for an in-person physical exam.  This service lets us provide the care you need with a video conversation.  If a prescription is necessary we can send it directly to your pharmacy.  If lab work is needed we can place an order for that and you can then stop by our lab to have the test done at a later time.    Video visits are billed at different rates depending on your insurance coverage.  Please reach out to your insurance provider with any questions.    If during the course of the call the physician/provider feels a video visit is not appropriate, you will not be charged for this service.\"    Patient has given verbal consent for Video visit? Yes, mobile phone number  [phone]    How would you like to obtain your AVS? May chart and mailed     Will anyone else be joining your video visit? No   "

## 2020-12-22 NOTE — PROGRESS NOTES
Ramya Hillman is a 75 year old female who is presenting at the current time to discuss her diagnosi(es) of        Claudication (H)  Hyperlipidemia LDL goal <70      HPI:      Ramya Hillman is a 75 year old female who was seen today regarding bilateral lower leg claudication.  She is a former smoker and who quit smoking all together in 8/2019 after her appointment with Dr. Salinas, who has since moved.  When seen on 9/17/2020, the patient could walk about 4 blocks/15 minutes before she would get cramping calf pain that resolves with short rest. This distance is very repeatable. She had improved since last year when she could only walk for 2 blocks and four minutes.  She denied chest pain and no stroke or TIA symptoms. Currently, she does not feel her lifestyle is severely limited by her symptoms.  She had bunion surgery on the right foot in 8/2019 which healed well. She has RLE digital issues with hammer toes which she wish to have corrected surgically with Dr. Sanchez. Her ZOILA's are currently  0.56 (had been 0.66 in 8/2019) on the right and 0.95 (had been 0.89 8/2019) on the left, with digit pressures > 60 mmHg bilaterally, thereby indicating she would heal hammer toe corrective surgery.  The patient is also denying rest pain and no tissue loss noted.      On simvastatin 20 mg daily. She was not at LDL goal < 70, and was switched to Crestor 40 mg daily. She returns for lab review presently. Her LDL-C and LDL-P are at goal, but her Lp(a) and cardiac CRP are modestly elevated.          Review Of Systems  Skin: negative  Eyes: negative  Ears/Nose/Throat: negative  Respiratory: No shortness of breath, dyspnea on exertion, cough, or hemoptysis  Cardiovascular: negative  Gastrointestinal: negative  Genitourinary: negative  Musculoskeletal: negative  Neurologic: negative  Psychiatric: negative  Hematologic/Lymphatic/Immunologic: negative  Endocrine: negative     PAST MEDICAL HISTORY:                  Past Medical  History:   Diagnosis Date     Arthritis 1/01/2000    Hands & feet     Chest pain      Chronic ischemic heart disease      Condyloma     had for5 years, 20 years ago,recurrence 2012 biopsy taken     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)      Family history of heart disease      Hyperlipidemia      PVD (peripheral vascular disease) (H)      Tobacco abuse        PAST SURGICAL HISTORY:                  Past Surgical History:   Procedure Laterality Date     BIOPSY  3/1/2018    Mole on back     BUNIONECTOMY Right 8/26/2019    Procedure: BUNIONECTOMY RIGHT FOOT;  Surgeon: Rajni Sanchez MD;  Location: [facility] OR     COLONOSCOPY  10/18/2018    1 polyp - all clear     NO HISTORY OF SURGERY       ORTHOPEDIC SURGERY  1/1/2012    Rotator Cuff - Shoulder       CURRENT MEDICATIONS:                  Current Outpatient Medications   Medication Sig Dispense Refill     aspirin 81 MG tablet Take by mouth daily 1 tablet      Cholecalciferol (VITAMIN D) 1000 UNITS capsule Take 1 capsule by mouth daily       Coenzyme Q10 (CO Q10) 100 MG CAPS Take by mouth daily       multivitamin, therapeutic with minerals (MULTI-VITAMIN) TABS Take 1 tablet by mouth daily       Omega-3 Fatty Acids (FISH OIL) 1000 MG CPDR Take by mouth daily       rosuvastatin (CRESTOR) 40 MG tablet Take 1 tablet (40 mg) by mouth daily 90 tablet 3       ALLERGIES:                  Allergies   Allergen Reactions     No Known Allergies        SOCIAL HISTORY:                  Social History     Socioeconomic History     Marital status:      Spouse name: Not on file     Number of children: 0     Years of education: Not on file     Highest education level: Not on file   Occupational History     Occupation: Sales   Social Needs     Financial resource strain: Not on file     Food insecurity     Worry: Not on file     Inability: Not on file     Transportation needs     Medical: Not on file     Non-medical:  Not on file   Tobacco Use     Smoking status: Former Smoker     Years: 30.00     Types: Cigarettes     Start date: 1990     Quit date: 2018     Years since quittin.3     Smokeless tobacco: Never Used     Tobacco comment:  2-3 cigs per day off and on   Substance and Sexual Activity     Alcohol use: Yes     Alcohol/week: 0.0 standard drinks     Comment: rare     Drug use: No     Sexual activity: Not Currently     Birth control/protection: Post-menopausal     Comment: postmenopausal   Lifestyle     Physical activity     Days per week: Not on file     Minutes per session: Not on file     Stress: Not on file   Relationships     Social connections     Talks on phone: Not on file     Gets together: Not on file     Attends Shinto service: Not on file     Active member of club or organization: Not on file     Attends meetings of clubs or organizations: Not on file     Relationship status: Not on file     Intimate partner violence     Fear of current or ex partner: Not on file     Emotionally abused: Not on file     Physically abused: Not on file     Forced sexual activity: Not on file   Other Topics Concern      Service Not Asked     Blood Transfusions Not Asked     Caffeine Concern No     Comment: 1 a day. mostly decaf     Occupational Exposure Not Asked     Hobby Hazards Not Asked     Sleep Concern Not Asked     Stress Concern Not Asked     Weight Concern Not Asked     Special Diet Not Asked     Back Care Not Asked     Exercise No     Comment: likes to walk     Bike Helmet Not Asked     Seat Belt Not Asked     Self-Exams Not Asked     Parent/sibling w/ CABG, MI or angioplasty before 65F 55M? Yes   Social History Narrative     Not on file       FAMILY HISTORY:                   Family History   Problem Relation Age of Onset     Cerebrovascular Disease Mother      Coronary Artery Disease Mother              Osteoporosis Mother      Hyperlipidemia Mother      Coronary Artery Disease Father 81              Hyperlipidemia Father      Coronary Artery Disease Brother              Hyperlipidemia Brother      Coronary Artery Disease Sister 52             Hyperlipidemia Sister      Coronary Artery Disease Brother 62     Hyperlipidemia Brother      Coronary Artery Disease Sister      Hyperlipidemia Sister      Coronary Artery Disease Sister      Hyperlipidemia Sister      Breast Cancer Sister 80             Breast Cancer Niece      Coronary Artery Disease Sister      Coronary Artery Disease Sister          Physical exam was not performed as it was a COVID mandated video visit    Component      Latest Ref Rng & Units 2020   Total Cholesterol      <=199 mg/dL 160 125    Triglycerides      30 - 149 mg/dL 127 85    HDL Cholesterol      40 - 59 mg/dL 58 64 (H)    LDL Cholesterol      <=129 mg/dL 77 44    HDL Size      >=8.9 nm 9.5 9.5    VLDL Size      <=46.7 nm 48.1 (H) 49.4 (H)    LDL Particle Size      >=20.7 nm 21.3 21.2    Lge HDL Particle number      >=4.2 umol/L 9.0 9.6    HDL Particle Number NMR      >=33.0 umol/L 31.7 (L) 34.6    Lge VLDL Part number      <=2.7 nmol/L 3.3 (H) 2.1    Small LDL Particle number      <=634 nmol/L <165 <165    LDL Particle Number      <=1,135 nmol/L [id number]    EER LipoFit by NMR       SEE NOTE SEE NOTE    Lipoprotein (a)      <=29 mg/dL 35 (H) 46 (H)    CRP Cardiac Risk      mg/L 1.7 1.3    Hemoglobin A1C      0 - 5.6 % 5.8 (H)  5.9 (H)   TSH      0.40 - 4.00 mU/L   3.33       A/P:    (I73.9) Claudication (H)  Comment: On her own she is walking 40 minutes w/o claudication.  Plan: PAD rehab once COVID is over. She healed her hammer toe corrections.    (E78.5) Hyperlipidemia LDL goal <70  Comment: at LDL goal on Crestor 40 mg daily but Lp(a) and CRP are elevated   Plan: I offered her Zetia 10 mg daily to be added to Cresotr. She will try that for three months to ascertain the effect on the above parameters; RTC for labs in  three months       (R73.01) IFG (impaired fasting glucose)  (primary encounter diagnosis)  Comment: avoid CHO  Plan: Hemoglobin A1c              Video start: 13:10  Video end: 13:35

## 2021-03-22 ENCOUNTER — HOSPITAL ENCOUNTER (OUTPATIENT)
Dept: ULTRASOUND IMAGING | Facility: CLINIC | Age: 76
Discharge: HOME | End: 2021-03-22
Attending: INTERNAL MEDICINE | Admitting: INTERNAL MEDICINE
Payer: COMMERCIAL

## 2021-03-22 DIAGNOSIS — I73.9 CLAUDICATION (H): ICD-10-CM

## 2021-03-22 DIAGNOSIS — R73.01 IFG (IMPAIRED FASTING GLUCOSE): ICD-10-CM

## 2021-03-22 DIAGNOSIS — E78.5 HYPERLIPIDEMIA LDL GOAL <70: ICD-10-CM

## 2021-03-22 LAB
CRP SERPL HS-MCNC: 2.2 MG/L
HBA1C MFR BLD: 5.7 % (ref 0–5.6)

## 2021-03-22 PROCEDURE — 99000 SPECIMEN HANDLING OFFICE-LAB: CPT | Performed by: INTERNAL MEDICINE

## 2021-03-22 PROCEDURE — 83695 ASSAY OF LIPOPROTEIN(A): CPT | Mod: 90 | Performed by: INTERNAL MEDICINE

## 2021-03-22 PROCEDURE — 36415 COLL VENOUS BLD VENIPUNCTURE: CPT | Performed by: INTERNAL MEDICINE

## 2021-03-22 PROCEDURE — 83704 LIPOPROTEIN BLD QUAN PART: CPT | Mod: 90 | Performed by: INTERNAL MEDICINE

## 2021-03-22 PROCEDURE — 80061 LIPID PANEL: CPT | Mod: 90 | Performed by: INTERNAL MEDICINE

## 2021-03-22 PROCEDURE — 93922 UPR/L XTREMITY ART 2 LEVELS: CPT

## 2021-03-22 PROCEDURE — 83036 HEMOGLOBIN GLYCOSYLATED A1C: CPT | Performed by: INTERNAL MEDICINE

## 2021-03-22 PROCEDURE — 86141 C-REACTIVE PROTEIN HS: CPT | Performed by: INTERNAL MEDICINE

## 2021-03-23 LAB — LPA SERPL-MCNC: 46 MG/DL

## 2021-03-25 LAB
CHOLEST SERPL-MCNC: 131 MG/DL
HDL SERPL QN: 9.9 NM
HDL SERPL-SCNC: 33.1 UMOL/L
HDLC SERPL-MCNC: 69 MG/DL (ref 40–59)
HLD.LARGE SERPL-SCNC: 12 UMOL/L
LDL SERPL QN: 21.2 NM
LDL SERPL-SCNC: 482 NMOL/L
LDL SMALL SERPL-SCNC: <165 NMOL/L
LDLC SERPL CALC-MCNC: 46 MG/DL
PATHOLOGY STUDY: ABNORMAL
TRIGL SERPL-MCNC: 82 MG/DL (ref 30–149)
VLDL LARGE SERPL-SCNC: 1.6 NMOL/L
VLDL SERPL QN: 48.8 NM

## 2021-04-06 ENCOUNTER — OFFICE VISIT (OUTPATIENT)
Dept: OTHER | Facility: CLINIC | Age: 76
End: 2021-04-06
Attending: INTERNAL MEDICINE
Payer: COMMERCIAL

## 2021-04-06 ENCOUNTER — TELEPHONE (OUTPATIENT)
Dept: OTHER | Facility: CLINIC | Age: 76
End: 2021-04-06

## 2021-04-06 VITALS
HEIGHT: 71 IN | SYSTOLIC BLOOD PRESSURE: 162 MMHG | WEIGHT: 170 LBS | BODY MASS INDEX: 23.8 KG/M2 | OXYGEN SATURATION: 99 % | RESPIRATION RATE: 18 BRPM | HEART RATE: 98 BPM | DIASTOLIC BLOOD PRESSURE: 87 MMHG

## 2021-04-06 DIAGNOSIS — R73.01 IFG (IMPAIRED FASTING GLUCOSE): ICD-10-CM

## 2021-04-06 DIAGNOSIS — I10 ESSENTIAL HYPERTENSION: Primary | ICD-10-CM

## 2021-04-06 DIAGNOSIS — E78.5 HYPERLIPIDEMIA LDL GOAL <70: ICD-10-CM

## 2021-04-06 DIAGNOSIS — I73.9 CLAUDICATION (H): ICD-10-CM

## 2021-04-06 DIAGNOSIS — I10 ESSENTIAL HYPERTENSION: ICD-10-CM

## 2021-04-06 PROCEDURE — G0463 HOSPITAL OUTPT CLINIC VISIT: HCPCS

## 2021-04-06 PROCEDURE — 99214 OFFICE O/P EST MOD 30 MIN: CPT | Performed by: INTERNAL MEDICINE

## 2021-04-06 RX ORDER — LISINOPRIL 20 MG/1
TABLET ORAL
Qty: 90 TABLET | Refills: 3 | Status: SHIPPED | OUTPATIENT
Start: 2021-04-06 | End: 2021-04-07

## 2021-04-06 RX ORDER — LISINOPRIL 20 MG/1
20 TABLET ORAL DAILY
Qty: 30 TABLET | Refills: 11 | Status: SHIPPED | OUTPATIENT
Start: 2021-04-06 | End: 2021-04-06

## 2021-04-06 ASSESSMENT — MIFFLIN-ST. JEOR: SCORE: 1367.24

## 2021-04-06 NOTE — PROGRESS NOTES
"Ramya Hillman is a 76 year old female who presents for:  Chief Complaint   Patient presents with     RECHECK     Fasting labs and ZOILA with Exercise done 03/22/21 - F/u to 12/22/20 visit -         Vitals:    Vitals:    04/06/21 1031 04/06/21 1033   BP: (!) 164/81 (!) 162/87   BP Location: Right arm Left arm   Patient Position: Chair Chair   Cuff Size: Adult Regular Adult Regular   Pulse: 98    Resp: 18    SpO2: 99%    Weight: 170 lb (77.1 kg)    Height: 5' 11\" (1.803 m)        BMI:  Estimated body mass index is 23.71 kg/m  as calculated from the following:    Height as of this encounter: 5' 11\" (1.803 m).    Weight as of this encounter: 170 lb (77.1 kg).    Pain Score:  Data Unavailable        Kelly Lora, [facility]    "

## 2021-04-06 NOTE — TELEPHONE ENCOUNTER
lisinopril (ZESTRIL) 20 MG tablet  Last Written Prescription Date:  4/6/21  Last Fill Quantity: 30,  # refills: 11     90 day supply request.    Mariah Ybarra RN BSN  [facility]  [phone]

## 2021-04-06 NOTE — TELEPHONE ENCOUNTER
Follow up to 4/6/21    Please call patient to arrange for in-clinic blood pressure check.  Time has been held 4/30/21 @ 8:40.    Mariah Ybarra RN BSN  [facility]  [phone]

## 2021-04-06 NOTE — TELEPHONE ENCOUNTER
[facility]     Who is the name of the provider?:  Dr Wolf     What is the location you see this provider at?:  [place]     Reason for call:  Patient does not want to be on lisinopril (ZESTRIL) 20 MG tablet - too many issues with this RX for other family members -can you change it to something else?    :  Ramya     Phone number to call:  [phone]    Additional Notes:

## 2021-04-06 NOTE — PROGRESS NOTES
Ramya Hillman is a 76 year old female who is presenting at the current time to discuss her diagnosi(es) of        Claudication (H)  Hyperlipidemia LDL goal <70  IFG (impaired fasting glucose)  .      HPI: Ramya Hillman is a 75 year old female who was seen today regarding bilateral lower leg claudication.  She is a former smoker and who quit smoking all together in 8/2019 after her appointment with Dr. Salinas, who has since moved.  When seen on 9/17/2020, the patient could walk about 4 blocks/15 minutes before she would get cramping calf pain that resolves with short rest. This distance is very repeatable. She had improved since last year when she could only walk for 2 blocks and four minutes.  She denied chest pain and no stroke or TIA symptoms. Currently, she does not feel her lifestyle is severely limited by her symptoms.  She had bunion surgery on the right foot in 8/2019 which healed well. She has RLE digital issues with hammer toes which she wish to have corrected surgically with Dr. Sanchez. Her ZOILA's are currently  0.56 (had been 0.66 in 8/2019) on the right and 0.95 (had been 0.89 8/2019) on the left, with digit pressures > 60 mmHg bilaterally, thereby indicating she would heal hammer toe corrective surgery.  The patient is also denying rest pain and no tissue loss noted.      On simvastatin 20 mg daily, she was not at LDL goal < 70, and was switched to Crestor 40 mg daily. She returned for lab review in December. 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 Zetia 10 mg daily was added to Crestor when last seen. Her LDL is unchanged. Her Lp(a) and CRP are still elevated. She admits she did not take the zetia. She is hypertensive.    Review Of Systems  Skin: negative  Eyes: negative  Ears/Nose/Throat: negative  Respiratory: No shortness of breath, dyspnea on exertion, cough, or hemoptysis  Cardiovascular: negative  Gastrointestinal: negative  Genitourinary:  negative  Musculoskeletal: negative  Neurologic: negative  Psychiatric: negative  Hematologic/Lymphatic/Immunologic: negative  Endocrine: negative      PAST MEDICAL HISTORY:                  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)      Family history of heart disease      Hyperlipidemia      PVD (peripheral vascular disease) (H)      Tobacco abuse        PAST SURGICAL HISTORY:                  Past Surgical History:   Procedure Laterality Date     BIOPSY  3/1/2018    Mole on back     BUNIONECTOMY Right 8/26/2019    Procedure: BUNIONECTOMY RIGHT FOOT;  Surgeon: Rajni Sanchez MD;  Location: [facility] OR     COLONOSCOPY  10/18/2018    1 polyp - all clear     NO HISTORY OF SURGERY       ORTHOPEDIC SURGERY  1/1/2012    Rotator Cuff - Shoulder       CURRENT MEDICATIONS:                  Current Outpatient Medications   Medication Sig Dispense Refill     aspirin 81 MG tablet Take by mouth daily 1 tablet      Cholecalciferol (VITAMIN D) 1000 UNITS capsule Take 1 capsule by mouth daily       Coenzyme Q10 (CO Q10) 100 MG CAPS Take by mouth daily       multivitamin, therapeutic with minerals (MULTI-VITAMIN) TABS Take 1 tablet by mouth daily       Omega-3 Fatty Acids (FISH OIL) 1000 MG CPDR Take by mouth daily       rosuvastatin (CRESTOR) 40 MG tablet Take 1 tablet (40 mg) by mouth daily 90 tablet 3     ezetimibe (ZETIA) 10 MG tablet Take 1 tablet (10 mg) by mouth daily (Patient not taking: Reported on 4/6/2021) 90 tablet 3       ALLERGIES:                  Allergies   Allergen Reactions     No Known Allergies        SOCIAL HISTORY:                  Social History     Socioeconomic History     Marital status:      Spouse name: Not on file     Number  of children: 0     Years of education: Not on file     Highest education level: Not on file   Occupational History     Occupation: Sales   Social Needs     Financial resource strain: Not on file     Food insecurity     Worry: Not on file     Inability: Not on file     Transportation needs     Medical: Not on file     Non-medical: Not on file   Tobacco Use     Smoking status: Former Smoker     Years: 30.00     Types: Cigarettes     Start date: 1990     Quit date: 2018     Years since quittin.6     Smokeless tobacco: Never Used     Tobacco comment:  2-3 cigs per day off and on   Substance and Sexual Activity     Alcohol use: Yes     Alcohol/week: 0.0 standard drinks     Comment: rare     Drug use: No     Sexual activity: Not Currently     Birth control/protection: Post-menopausal     Comment: postmenopausal   Lifestyle     Physical activity     Days per week: Not on file     Minutes per session: Not on file     Stress: Not on file   Relationships     Social connections     Talks on phone: Not on file     Gets together: Not on file     Attends [employer] service: Not on file     Active member of club or organization: Not on file     Attends meetings of clubs or organizations: Not on file     Relationship status: Not on file     Intimate partner violence     Fear of current or ex partner: Not on file     Emotionally abused: Not on file     Physically abused: Not on file     Forced sexual activity: Not on file   Other Topics Concern      Service Not Asked     Blood Transfusions Not Asked     Caffeine Concern No     Comment: 1 a day. mostly decaf     Occupational Exposure Not Asked     Hobby Hazards Not Asked     Sleep Concern Not Asked     Stress Concern Not Asked     Weight Concern Not Asked     Special Diet Not Asked     Back Care Not Asked     Exercise No     Comment: likes to walk     Bike Helmet Not Asked     Seat Belt Not Asked     Self-Exams Not Asked     Parent/sibling w/ CABG, MI or  angioplasty before 65F 55M? Yes   Social History Narrative     Not on file       FAMILY HISTORY:                   Family History   Problem Relation Age of Onset     Cerebrovascular Disease Mother      Coronary Artery Disease Mother              Osteoporosis Mother      Hyperlipidemia Mother      Coronary Artery Disease Father 81             Hyperlipidemia Father      Coronary Artery Disease Brother              Hyperlipidemia Brother      Coronary Artery Disease Sister 52             Hyperlipidemia Sister      Coronary Artery Disease Brother 62     Hyperlipidemia Brother      Coronary Artery Disease Sister      Hyperlipidemia Sister      Coronary Artery Disease Sister      Hyperlipidemia Sister      Breast Cancer Sister 80             Breast Cancer Niece      Coronary Artery Disease Sister      Coronary Artery Disease Sister          Physical exam Reveals:    O/P: WNL  HEENT: WNL  NECK: No JVD, thyromegaly, or lymphadenopathy  HEART: RRR, no murmurs, gallops, or rubs  LUNGS: CTA bilaterally without rales, wheezes, or rhonchi  GI: NABS, nondistended, nontender, soft  EXT:without cyanosis, clubbing, or edema  NEURO: nonfocal  : no flank tenderness    US ANKLE-BRACHIAL INDEX DOPPLER NO EXERCISE, ONE-TWO LEVELS, QUESTION  OF BILATERAL   3/22/2021 9:25 AM      HISTORY: Claudication (H)     COMPARISON: 2020     FINDINGS:  Right ZOILA:   PT: 0.68.  DP: 0.67     Left ZOILA:   PT: 0.96.  DP: 0.96      Waveforms: Biphasic on the right, triphasic on the left.                                                                      IMPRESSION:   1. Right ZOILA shows moderate arterial insufficiency, slightly improved  when compared to the prior exam.  2. Left ZOILA is normal, similar to the prior study.     ZOILA CRITERIA:  >1.4 NC  0.95-1.4 Normal  0.90 - 0.94 Mild  0.5 - 0.89 Moderate  0.2 - 0.49 Severe  <0.2 Critical     JULIUS MOSES DO      Component      Latest Ref Rng & Units  12/8/2020 3/22/2021   Total Cholesterol      <=199 mg/dL 125 131   Triglycerides      30 - 149 mg/dL 85 82   HDL Cholesterol      40 - 59 mg/dL 64 (H) 69 (H)   LDL Cholesterol      <=129 mg/dL 44 46   HDL Size      >=8.9 nm 9.5 9.9   VLDL Size      <=46.7 nm 49.4 (H) 48.8 (H)   LDL Particle Size      >=20.7 nm 21.2 21.2   Lge HDL Particle number      >=4.2 umol/L 9.6 12.0   HDL Particle Number NMR      >=33.0 umol/L 34.6 33.1   Lge VLDL Part number      <=2.7 nmol/L 2.1 1.6   Small LDL Particle number      <=634 nmol/L <165 <165   LDL Particle Number      <=1,135 nmol/L [id number]   EER LipoFit by NMR       SEE NOTE SEE NOTE   Lipoprotein (a)      <=29 mg/dL 46 (H) 46 (H)   CRP Cardiac Risk      mg/L 1.3 2.2   Hemoglobin A1C      0 - 5.6 %  5.7 (H)       A/P:           (I73.9) Claudication (H)  Comment: On her own she is walking 40 minutes w/o claudication. Mild PAD on stress ZOILA. No rest pain or tissue loss.   Plan: PAD rehab once COVID is over. She healed her hammer toe corrections.     (E78.5) Hyperlipidemia LDL goal <70  Comment: at LDL goal on Crestor 40 mg daily but Lp(a) and CRP are elevated   Plan: I offered her Zetia 10 mg daily to be added to Crestor. She said she would try that for three months to ascertain the effect on the above parameters, but then decided not to. That is her choice.         (R73.01) IFG (impaired fasting glucose)  (primary encounter diagnosis)  Comment: avoid CHO  Plan: Hemoglobin A1c      (I10) Essential hypertension  (primary encounter diagnosis)  Comment: hypertensive, start the below  Plan: lisinopril (ZESTRIL) 20 MG tablet        RTC 4/30/2021 for BP check.

## 2021-04-07 RX ORDER — LOSARTAN POTASSIUM 50 MG/1
50 TABLET ORAL DAILY
Qty: 30 TABLET | Refills: 11 | Status: SHIPPED | OUTPATIENT
Start: 2021-04-07 | End: 2021-04-07

## 2021-04-07 RX ORDER — LOSARTAN POTASSIUM 50 MG/1
50 TABLET ORAL DAILY
Qty: 30 TABLET | Refills: 11 | Status: SHIPPED | OUTPATIENT
Start: 2021-04-07 | End: 2022-03-30

## 2021-04-07 NOTE — TELEPHONE ENCOUNTER
Patient request transfer of losartan to [facility].  Mariah Ybarra RN BSN  [facility]  [phone]

## 2021-04-10 ENCOUNTER — ANCILLARY PROCEDURE (OUTPATIENT)
Dept: GENERAL RADIOLOGY | Facility: CLINIC | Age: 76
End: 2021-04-10
Attending: PHYSICIAN ASSISTANT
Payer: COMMERCIAL

## 2021-04-10 ENCOUNTER — OFFICE VISIT (OUTPATIENT)
Dept: URGENT CARE | Facility: URGENT CARE | Age: 76
End: 2021-04-10
Payer: COMMERCIAL

## 2021-04-10 VITALS
DIASTOLIC BLOOD PRESSURE: 88 MMHG | HEART RATE: 78 BPM | SYSTOLIC BLOOD PRESSURE: 168 MMHG | HEIGHT: 71 IN | OXYGEN SATURATION: 99 % | RESPIRATION RATE: 16 BRPM | BODY MASS INDEX: 23.8 KG/M2 | WEIGHT: 170 LBS | TEMPERATURE: 98.5 F

## 2021-04-10 DIAGNOSIS — R52 PAIN: Primary | ICD-10-CM

## 2021-04-10 DIAGNOSIS — R07.89 CHEST PRESSURE: ICD-10-CM

## 2021-04-10 DIAGNOSIS — I25.9 CHRONIC ISCHEMIC HEART DISEASE: ICD-10-CM

## 2021-04-10 DIAGNOSIS — K30 INDIGESTION: ICD-10-CM

## 2021-04-10 DIAGNOSIS — R06.02 SOB (SHORTNESS OF BREATH): ICD-10-CM

## 2021-04-10 DIAGNOSIS — R11.0 NAUSEA: ICD-10-CM

## 2021-04-10 DIAGNOSIS — I10 ESSENTIAL HYPERTENSION: ICD-10-CM

## 2021-04-10 LAB
ALBUMIN SERPL-MCNC: 3.8 G/DL (ref 3.4–5)
ALP SERPL-CCNC: 71 U/L (ref 40–150)
ALT SERPL W P-5'-P-CCNC: 32 U/L (ref 0–50)
AMYLASE SERPL-CCNC: 63 U/L (ref 30–110)
ANION GAP SERPL CALCULATED.3IONS-SCNC: 3 MMOL/L (ref 3–14)
AST SERPL W P-5'-P-CCNC: 24 U/L (ref 0–45)
BASOPHILS # BLD AUTO: 0 10E9/L (ref 0–0.2)
BASOPHILS NFR BLD AUTO: 0.4 %
BILIRUB SERPL-MCNC: 0.6 MG/DL (ref 0.2–1.3)
BUN SERPL-MCNC: 19 MG/DL (ref 7–30)
CALCIUM SERPL-MCNC: 9.2 MG/DL (ref 8.5–10.1)
CHLORIDE SERPL-SCNC: 109 MMOL/L (ref 94–109)
CO2 SERPL-SCNC: 27 MMOL/L (ref 20–32)
CREAT SERPL-MCNC: 0.82 MG/DL (ref 0.52–1.04)
DIFFERENTIAL METHOD BLD: NORMAL
EOSINOPHIL # BLD AUTO: 0.3 10E9/L (ref 0–0.7)
EOSINOPHIL NFR BLD AUTO: 3.8 %
ERYTHROCYTE [DISTWIDTH] IN BLOOD BY AUTOMATED COUNT: 14.5 % (ref 10–15)
ERYTHROCYTE [SEDIMENTATION RATE] IN BLOOD BY WESTERGREN METHOD: 10 MM/H (ref 0–30)
GFR SERPL CREATININE-BSD FRML MDRD: 70 ML/MIN/{1.73_M2}
GLUCOSE SERPL-MCNC: 116 MG/DL (ref 70–99)
HCT VFR BLD AUTO: 42.5 % (ref 35–47)
HGB BLD-MCNC: 13.8 G/DL (ref 11.7–15.7)
LIPASE SERPL-CCNC: 144 U/L (ref 73–393)
LYMPHOCYTES # BLD AUTO: 1.8 10E9/L (ref 0.8–5.3)
LYMPHOCYTES NFR BLD AUTO: 23.9 %
MCH RBC QN AUTO: 30.5 PG (ref 26.5–33)
MCHC RBC AUTO-ENTMCNC: 32.5 G/DL (ref 31.5–36.5)
MCV RBC AUTO: 94 FL (ref 78–100)
MONOCYTES # BLD AUTO: 0.6 10E9/L (ref 0–1.3)
MONOCYTES NFR BLD AUTO: 8.3 %
NEUTROPHILS # BLD AUTO: 4.7 10E9/L (ref 1.6–8.3)
NEUTROPHILS NFR BLD AUTO: 63.6 %
PLATELET # BLD AUTO: 268 10E9/L (ref 150–450)
POTASSIUM SERPL-SCNC: 4.9 MMOL/L (ref 3.4–5.3)
PROT SERPL-MCNC: 7.5 G/DL (ref 6.8–8.8)
RBC # BLD AUTO: 4.52 10E12/L (ref 3.8–5.2)
SODIUM SERPL-SCNC: 139 MMOL/L (ref 133–144)
TROPONIN I SERPL-MCNC: <0.015 UG/L (ref 0–0.04)
WBC # BLD AUTO: 7.3 10E9/L (ref 4–11)

## 2021-04-10 PROCEDURE — 85652 RBC SED RATE AUTOMATED: CPT | Performed by: PHYSICIAN ASSISTANT

## 2021-04-10 PROCEDURE — 82150 ASSAY OF AMYLASE: CPT | Performed by: PHYSICIAN ASSISTANT

## 2021-04-10 PROCEDURE — 84484 ASSAY OF TROPONIN QUANT: CPT | Performed by: PHYSICIAN ASSISTANT

## 2021-04-10 PROCEDURE — 80053 COMPREHEN METABOLIC PANEL: CPT | Performed by: PHYSICIAN ASSISTANT

## 2021-04-10 PROCEDURE — 85025 COMPLETE CBC W/AUTO DIFF WBC: CPT | Performed by: PHYSICIAN ASSISTANT

## 2021-04-10 PROCEDURE — 36415 COLL VENOUS BLD VENIPUNCTURE: CPT | Performed by: PHYSICIAN ASSISTANT

## 2021-04-10 PROCEDURE — 99215 OFFICE O/P EST HI 40 MIN: CPT | Performed by: PHYSICIAN ASSISTANT

## 2021-04-10 PROCEDURE — 71046 X-RAY EXAM CHEST 2 VIEWS: CPT | Performed by: RADIOLOGY

## 2021-04-10 PROCEDURE — 93000 ELECTROCARDIOGRAM COMPLETE: CPT | Performed by: PHYSICIAN ASSISTANT

## 2021-04-10 PROCEDURE — 83690 ASSAY OF LIPASE: CPT | Performed by: PHYSICIAN ASSISTANT

## 2021-04-10 RX ORDER — OMEPRAZOLE 40 MG/1
40 CAPSULE, DELAYED RELEASE ORAL DAILY
Qty: 30 CAPSULE | Refills: 0 | Status: SHIPPED | OUTPATIENT
Start: 2021-04-10 | End: 2021-04-30

## 2021-04-10 RX ORDER — ASPIRIN 325 MG
325 TABLET ORAL ONCE
Status: COMPLETED | OUTPATIENT
Start: 2021-04-10 | End: 2021-04-10

## 2021-04-10 RX ORDER — LOSARTAN POTASSIUM AND HYDROCHLOROTHIAZIDE 12.5; 5 MG/1; MG/1
1 TABLET ORAL DAILY
Qty: 30 TABLET | Refills: 0 | Status: SHIPPED | OUTPATIENT
Start: 2021-04-10 | End: 2021-04-30 | Stop reason: ALTCHOICE

## 2021-04-10 RX ADMIN — Medication 325 MG: at 12:24

## 2021-04-10 ASSESSMENT — MIFFLIN-ST. JEOR: SCORE: 1367.24

## 2021-04-10 NOTE — PROGRESS NOTES
Clinic Administered Medication Documentation    Oral Medication Documentation    Patient was given aspirin 325mg. Prior to medication administration, verified patients identity using patient s name and date of birth. Please see MAR and medication order for additional information.     Was entire amount of medication used? Yes  Expiration Date: 05/2022

## 2021-04-10 NOTE — PROGRESS NOTES
Assessment & Plan     Pain  Atypical chest pain, pressure  EKG normal  - EKG 12-lead complete w/read - Clinics  - aspirin (ASA) tablet 325 mg  - Troponin I  - Lipase  - Amylase    Chest pressure  Troponin and EKG negative for cardiac event  -aspirin (ASA) tablet 325 mg  ESR and EKG negative for pericarditis  If any symptoms worsen then go to the ED  - ESR: Erythrocyte sedimentation rate  - Troponin I  - XR Chest 2 Views  - lidocaine (XYLOCAINE) 2 % 15 mL, alum & mag hydroxide-simethicone (MAALOX) 15 mL GI Cocktail    SOB (shortness of breath)  Chest xray negative for cardiomegaly, pneumothorax  If any symptoms worsen then go to the ED  - aspirin (ASA) tablet 325 mg  - ESR: Erythrocyte sedimentation rate  - Troponin I  - XR Chest 2 Views    Nausea  Amylase and lipase neg for pancreatitis  CMP negative for renal or hepatic involvement  - Troponin I  - CBC with platelets differential  - Comprehensive metabolic panel  - omeprazole (PRILOSEC) 40 MG DR capsule; Take 1 capsule (40 mg) by mouth daily    Chronic ischemic heart disease  Patient to schedule follow up with cardiogist  If any symptoms worsen then go to the ED  - EKG 12-lead complete w/read - Clinics  - aspirin (ASA) tablet 325 mg  - Troponin I    Indigestion  GI cocktail improved chest discomfort  Amylase and Lipase negative for pancreatitis  Omeprazole for indigestion  - Troponin I  - Lipase  - Amylase  - omeprazole (PRILOSEC) 40 MG DR capsule; Take 1 capsule (40 mg) by mouth daily    Essential hypertension  Start Hyzaar  - losartan-hydrochlorothiazide (HYZAAR) 50-12.5 MG tablet; Take 1 tablet by mouth daily  56}     CONSULTATION/REFERRAL to cardiologist      Diaz Perdomo PA-C  [facility] JEWEL Bui is a 76 year old who presents for the following health issues     HPI     Intermittent SOB with exertion  Chest discomfort  GERD   Symptoms for 2 days    Review of Systems   Constitutional, HEENT, cardiovascular,  "pulmonary, GI, , musculoskeletal, neuro, skin, endocrine and psych systems are negative, except as otherwise noted.      Objective    BP (!) 168/88 (BP Location: Left arm, Patient Position: Sitting, Cuff Size: Adult Regular)   Pulse 78   Temp 98.5  F (36.9  C)   Resp 16   Ht 1.803 m (5' 11\")   Wt 77.1 kg (170 lb)   SpO2 99%   BMI 23.71 kg/m    Body mass index is 23.71 kg/m .  Physical Exam   GENERAL: healthy, alert and no distress  EYES: Eyes grossly normal to inspection, PERRL and conjunctivae and sclerae normal  HENT: ear canals and TM's normal, nose and mouth without ulcers or lesions  NECK: no adenopathy, no asymmetry, masses, or scars and thyroid normal to palpation  RESP: lungs clear to auscultation - no rales, rhonchi or wheezes  BREAST: normal without masses, tenderness or nipple discharge and no palpable axillary masses or adenopathy  CV: regular rate and rhythm, normal S1 S2, no S3 or S4, no murmur, click or rub, no peripheral edema and peripheral pulses strong  ABDOMEN: soft, nontender, no hepatosplenomegaly, no masses and bowel sounds normal  MS: no gross musculoskeletal defects noted, no edema  SKIN: no suspicious lesions or rashes  NEURO: Normal strength and tone, mentation intact and speech normal  PSYCH: mentation appears normal, affect normal/bright    Xray - Reviewed and interpreted by me.  Negative for acute findings, read by Diaz Perdomo PA-C [facility] at time of visit.    EKG - Reviewed and interpreted by me appears normal, NSR, normal axis, normal intervals, no acute ST/T changes c/w ischemia, no LVH by voltage criteria, unchanged from previous tracings    Results for orders placed or performed in visit on 04/10/21   XR Chest 2 Views     Status: None    Narrative    XR CHEST TWO VIEWS   4/10/2021 1:05 PM     HISTORY: Chest pressure. SOB (shortness of breath).    COMPARISON: Chest x-ray 7/26/2006.      Impression    IMPRESSION: PA and lateral views of the chest. Lungs are " hyperinflated  consistent with COPD. No pulmonary infiltrate or consolidation. Heart  is normal in size. No effusions are evident. No pneumothorax.    YESICA GUTIÉRREZ MD   ESR: Erythrocyte sedimentation rate     Status: None   Result Value Ref Range    Sed Rate 10 0 - 30 mm/h   Troponin I     Status: None   Result Value Ref Range    Troponin I ES <0.015 0.000 - 0.045 ug/L   CBC with platelets differential     Status: None   Result Value Ref Range    WBC 7.3 4.0 - 11.0 10e9/L    RBC Count 4.52 3.8 - 5.2 10e12/L    Hemoglobin 13.8 11.7 - 15.7 g/dL    Hematocrit 42.5 35.0 - 47.0 %    MCV 94 78 - 100 fl    MCH 30.5 26.5 - 33.0 pg    MCHC 32.5 31.5 - 36.5 g/dL    RDW 14.5 10.0 - 15.0 %    Platelet Count 268 150 - 450 10e9/L    % Neutrophils 63.6 %    % Lymphocytes 23.9 %    % Monocytes 8.3 %    % Eosinophils 3.8 %    % Basophils 0.4 %    Absolute Neutrophil 4.7 1.6 - 8.3 10e9/L    Absolute Lymphocytes 1.8 0.8 - 5.3 10e9/L    Absolute Monocytes 0.6 0.0 - 1.3 10e9/L    Absolute Eosinophils 0.3 0.0 - 0.7 10e9/L    Absolute Basophils 0.0 0.0 - 0.2 10e9/L    Diff Method Automated Method    Comprehensive metabolic panel     Status: Abnormal   Result Value Ref Range    Sodium 139 133 - 144 mmol/L    Potassium 4.9 3.4 - 5.3 mmol/L    Chloride 109 94 - 109 mmol/L    Carbon Dioxide 27 20 - 32 mmol/L    Anion Gap 3 3 - 14 mmol/L    Glucose 116 (H) 70 - 99 mg/dL    Urea Nitrogen 19 7 - 30 mg/dL    Creatinine 0.82 0.52 - 1.04 mg/dL    GFR Estimate 70 >60 mL/min/[1.73_m2]    GFR Estimate If Black 82 >60 mL/min/[1.73_m2]    Calcium 9.2 8.5 - 10.1 mg/dL    Bilirubin Total 0.6 0.2 - 1.3 mg/dL    Albumin 3.8 3.4 - 5.0 g/dL    Protein Total 7.5 6.8 - 8.8 g/dL    Alkaline Phosphatase 71 40 - 150 U/L    ALT 32 0 - 50 U/L    AST 24 0 - 45 U/L   Amylase     Status: None   Result Value Ref Range    Amylase 63 30 - 110 U/L

## 2021-04-12 ENCOUNTER — OFFICE VISIT (OUTPATIENT)
Dept: CARDIOLOGY | Facility: CLINIC | Age: 76
End: 2021-04-12
Payer: COMMERCIAL

## 2021-04-12 VITALS
HEIGHT: 71 IN | DIASTOLIC BLOOD PRESSURE: 80 MMHG | WEIGHT: 168 LBS | BODY MASS INDEX: 23.52 KG/M2 | SYSTOLIC BLOOD PRESSURE: 144 MMHG | HEART RATE: 83 BPM

## 2021-04-12 DIAGNOSIS — I73.9 PVD (PERIPHERAL VASCULAR DISEASE) (H): Primary | ICD-10-CM

## 2021-04-12 DIAGNOSIS — R07.2 PRECORDIAL PAIN: ICD-10-CM

## 2021-04-12 PROCEDURE — 99203 OFFICE O/P NEW LOW 30 MIN: CPT | Performed by: INTERNAL MEDICINE

## 2021-04-12 ASSESSMENT — MIFFLIN-ST. JEOR: SCORE: 1350.23

## 2021-04-12 NOTE — PROGRESS NOTES
HPI and Plan:   76-year-old lady here for evaluation of chest discomfort hypertension.    See my colleague Dr. Tejinder Flores's in 2015 for assessment of atypical chest discomfort.  Chest echocardiogram and was negative for ischemia.    She gave up smoking about 2 years ago.  She tells me that she has been smoking since she was a child.  She was seen in emergency room recently.  She has GERD and she woke up with epigastric lower chest and abdominal discomfort with nausea dyspnea and the symptoms lasted for several hours.  She was seen in the emergency room.  First troponin was negative and EKG was unremarkable.  These tests were personally reviewed.  Diagnosis of recurrent GERD was made and she was put on a proton pump inhibitor.  In addition she was given an extra dose prescription of losartan hydrochlorothiazide.  Since then she has not had recurrent of chest discomfort but she has noted that she has been more short of breath over the past few months.  There is no PND orthopnea or ankle swelling.  She follows with Dr. Dominik Wolf for nonobstructive PAD.  She takes high-dose rosuvastatin for dyslipidemia.    Cardiac examination is unremarkable.  Initial blood pressure was around 166 systolic.  When I rechecked it and there was a 20 point drop.  I advised her to continue taking losartan high-dose together with hydrochlorothiazide.    She may be getting more out of breath she may have COPD and she is aging.  However there is certainly the possibility that this shortness of breath on exertion is an anginal equivalent and for this reason I will request a stress nuclear study for further evaluation.  If this is normal she can follow-up with Dr. Wolf for her cardiac risk factors.    No orders of the defined types were placed in this encounter.      No orders of the defined types were placed in this encounter.      Encounter Diagnoses   Name Primary?     PVD (peripheral vascular disease) (H) Yes     Precordial pain         CURRENT MEDICATIONS:  Current Outpatient Medications   Medication Sig Dispense Refill     aspirin 81 MG tablet Take by mouth daily 1 tablet      Cholecalciferol (VITAMIN D) 1000 UNITS capsule Take 1 capsule by mouth daily       Coenzyme Q10 (CO Q10) 100 MG CAPS Take by mouth daily       losartan (COZAAR) 50 MG tablet Take 1 tablet (50 mg) by mouth daily 30 tablet 11     losartan-hydrochlorothiazide (HYZAAR) 50-12.5 MG tablet Take 1 tablet by mouth daily 30 tablet 0     multivitamin, therapeutic with minerals (MULTI-VITAMIN) TABS Take 1 tablet by mouth daily       Omega-3 Fatty Acids (FISH OIL) 1000 MG CPDR Take by mouth daily       omeprazole (PRILOSEC) 40 MG DR capsule Take 1 capsule (40 mg) by mouth daily 30 capsule 0     rosuvastatin (CRESTOR) 40 MG tablet Take 1 tablet (40 mg) by mouth daily 90 tablet 3       ALLERGIES     Allergies   Allergen Reactions     No Known Allergies        PAST MEDICAL HISTORY:  Past Medical History:   Diagnosis Date     Arthritis 2000    Hands & feet     Chest pain      Chronic ischemic heart disease      Condyloma     had for5 years, 20 years ago,recurrence  biopsy taken     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)      Family history of heart disease      Hyperlipidemia      PVD (peripheral vascular disease) (H)      Tobacco abuse        PAST SURGICAL HISTORY:  Past Surgical History:   Procedure Laterality Date     BIOPSY  3/1/2018    Mole on back     BUNIONECTOMY Right 2019    Procedure: BUNIONECTOMY RIGHT FOOT;  Surgeon: Rajni Sanchez MD;  Location: [facility] OR     COLONOSCOPY  10/18/2018    1 polyp - all clear     NO HISTORY OF SURGERY       ORTHOPEDIC SURGERY  2012    Rotator Cuff - Shoulder       FAMILY HISTORY:  Family History   Problem Relation Age of Onset     Cerebrovascular Disease Mother      Coronary Artery Disease Mother              Osteoporosis Mother      Hyperlipidemia  Mother      Coronary Artery Disease Father 81             Hyperlipidemia Father      Coronary Artery Disease Brother              Hyperlipidemia Brother      Coronary Artery Disease Sister 52             Hyperlipidemia Sister      Coronary Artery Disease Brother 62     Hyperlipidemia Brother      Coronary Artery Disease Sister      Hyperlipidemia Sister      Coronary Artery Disease Sister      Hyperlipidemia Sister      Breast Cancer Sister 80             Breast Cancer Niece      Coronary Artery Disease Sister      Coronary Artery Disease Sister        SOCIAL HISTORY:  Social History     Socioeconomic History     Marital status:      Spouse name: None     Number of children: 0     Years of education: None     Highest education level: None   Occupational History     Occupation: Sales   Social Needs     Financial resource strain: None     Food insecurity     Worry: None     Inability: None     Transportation needs     Medical: None     Non-medical: None   Tobacco Use     Smoking status: Former Smoker     Years: 3000     Types: Cigarettes     Start date: 1990     Quit date: 2018     Years since quittin.6     Smokeless tobacco: Never Used     Tobacco comment:  2-3 cigs per day off and on   Substance and Sexual Activity     Alcohol use: Yes     Alcohol/week: 0.0 standard drinks     Comment: rare     Drug use: No     Sexual activity: Not Currently     Birth control/protection: Post-menopausal     Comment: postmenopausal   Lifestyle     Physical activity     Days per week: None     Minutes per session: None     Stress: None   Relationships     Social connections     Talks on phone: None     Gets together: None     Attends Yazidi service: None     Active member of club or organization: None     Attends meetings of clubs or organizations: None     Relationship status: None     Intimate partner violence     Fear of current or ex partner: None     Emotionally abused: None  "    Physically abused: None     Forced sexual activity: None   Other Topics Concern      Service Not Asked     Blood Transfusions Not Asked     Caffeine Concern No     Comment: 1 a day. mostly decaf     Occupational Exposure Not Asked     Hobby Hazards Not Asked     Sleep Concern Not Asked     Stress Concern Not Asked     Weight Concern Not Asked     Special Diet Not Asked     Back Care Not Asked     Exercise No     Comment: likes to walk     Bike Helmet Not Asked     Seat Belt Not Asked     Self-Exams Not Asked     Parent/sibling w/ CABG, MI or angioplasty before 65F 55M? Yes   Social History Narrative     None       Review of Systems:  Skin:  Negative     Eyes:  Positive for glasses  ENT:  Negative    Respiratory:  Positive for dyspnea on exertion  Cardiovascular:  Negative Positive for;chest pain;exercise intolerance;dizziness(15 minutes after taking a walk. burning feeling)  Gastroenterology: Positive for heartburn(improved)  Genitourinary:  not assessed    Musculoskeletal:  Negative    Neurologic:  Negative    Psychiatric:  Negative    Heme/Lymph/Imm:  Negative    Endocrine:  Negative      Physical Exam:  Vitals: BP (!) 160/85   Pulse 83   Ht 1.791 m (5' 10.5\")   Wt 76.2 kg (168 lb)   BMI 23.76 kg/m      Constitutional:           Skin:             Head:           Eyes:           Lymph:      ENT:           Neck:           Respiratory:            Cardiac:                                                           GI:           Extremities and Muscular Skeletal:                 Neurological:           Psych:           Recent Lab Results:  LIPID RESULTS:  Lab Results   Component Value Date    CHOL 168 12/10/2019    HDL 64 12/10/2019    LDL 85 12/10/2019    TRIG 94 12/10/2019    CHOLHDLRATIO 2.6 10/30/2015       LIVER ENZYME RESULTS:  Lab Results   Component Value Date    AST 24 04/10/2021    ALT 32 04/10/2021       CBC RESULTS:  Lab Results   Component Value Date    WBC 7.3 04/10/2021    RBC 4.52 " 04/10/2021    HGB 13.8 04/10/2021    HCT 42.5 04/10/2021    MCV 94 04/10/2021    MCH 30.5 04/10/2021    MCHC 32.5 04/10/2021    RDW 14.5 04/10/2021     04/10/2021       BMP RESULTS:  Lab Results   Component Value Date     04/10/2021    POTASSIUM 4.9 04/10/2021    CHLORIDE 109 04/10/2021    CO2 27 04/10/2021    ANIONGAP 3 04/10/2021     (H) 04/10/2021    BUN 19 04/10/2021    CR 0.82 04/10/2021    GFRESTIMATED 70 04/10/2021    GFRESTBLACK 82 04/10/2021    ASHLEY 9.2 04/10/2021        A1C RESULTS:  Lab Results   Component Value Date    A1C 5.7 (H) 03/22/2021       INR RESULTS:  No results found for: INR        CC  No referring provider defined for this encounter.

## 2021-04-12 NOTE — LETTER
4/12/2021    Madan Lizama MD  [address]  [facility] [address]    RE: Ramya Hillman       Dear Colleague,    I had the pleasure of seeing Ramya Hillman in the [facility].    HPI and Plan:   76-year-old lady here for evaluation of chest discomfort hypertension.    See my colleague Dr. Tejinder Flores's in 2015 for assessment of atypical chest discomfort.  Chest echocardiogram and was negative for ischemia.    She gave up smoking about 2 years ago.  She tells me that she has been smoking since she was a child.  She was seen in emergency room recently.  She has GERD and she woke up with epigastric lower chest and abdominal discomfort with nausea dyspnea and the symptoms lasted for several hours.  She was seen in the emergency room.  First troponin was negative and EKG was unremarkable.  These tests were personally reviewed.  Diagnosis of recurrent GERD was made and she was put on a proton pump inhibitor.  In addition she was given an extra dose prescription of losartan hydrochlorothiazide.  Since then she has not had recurrent of chest discomfort but she has noted that she has been more short of breath over the past few months.  There is no PND orthopnea or ankle swelling.  She follows with Dr. Dominik Wolf for nonobstructive PAD.  She takes high-dose rosuvastatin for dyslipidemia.    Cardiac examination is unremarkable.  Initial blood pressure was around 166 systolic.  When I rechecked it and there was a 20 point drop.  I advised her to continue taking losartan high-dose together with hydrochlorothiazide.    She may be getting more out of breath she may have COPD and she is aging.  However there is certainly the possibility that this shortness of breath on exertion is an anginal equivalent and for this reason I will request a stress nuclear study for further evaluation.  If this is normal she can follow-up with Dr. Wolf for her cardiac  risk factors.    No orders of the defined types were placed in this encounter.      No orders of the defined types were placed in this encounter.      Encounter Diagnoses   Name Primary?     PVD (peripheral vascular disease) (H) Yes     Precordial pain        CURRENT MEDICATIONS:  Current Outpatient Medications   Medication Sig Dispense Refill     aspirin 81 MG tablet Take by mouth daily 1 tablet      Cholecalciferol (VITAMIN D) 1000 UNITS capsule Take 1 capsule by mouth daily       Coenzyme Q10 (CO Q10) 100 MG CAPS Take by mouth daily       losartan (COZAAR) 50 MG tablet Take 1 tablet (50 mg) by mouth daily 30 tablet 11     losartan-hydrochlorothiazide (HYZAAR) 50-12.5 MG tablet Take 1 tablet by mouth daily 30 tablet 0     multivitamin, therapeutic with minerals (MULTI-VITAMIN) TABS Take 1 tablet by mouth daily       Omega-3 Fatty Acids (FISH OIL) 1000 MG CPDR Take by mouth daily       omeprazole (PRILOSEC) 40 MG DR capsule Take 1 capsule (40 mg) by mouth daily 30 capsule 0     rosuvastatin (CRESTOR) 40 MG tablet Take 1 tablet (40 mg) by mouth daily 90 tablet 3       ALLERGIES     Allergies   Allergen Reactions     No Known Allergies        PAST MEDICAL HISTORY:  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)      Family history of heart disease      Hyperlipidemia      PVD (peripheral vascular disease) (H)      Tobacco abuse        PAST SURGICAL HISTORY:  Past Surgical History:   Procedure Laterality Date     BIOPSY  3/1/2018    Mole on back     BUNIONECTOMY Right 8/26/2019    Procedure: BUNIONECTOMY RIGHT FOOT;  Surgeon: Rajni Sanchez MD;  Location: [facility] OR     COLONOSCOPY  10/18/2018    1 polyp - all clear     NO HISTORY OF SURGERY        ORTHOPEDIC SURGERY  2012    Rotator Cuff - Shoulder       FAMILY HISTORY:  Family History   Problem Relation Age of Onset     Cerebrovascular Disease Mother      Coronary Artery Disease Mother              Osteoporosis Mother      Hyperlipidemia Mother      Coronary Artery Disease Father 81             Hyperlipidemia Father      Coronary Artery Disease Brother              Hyperlipidemia Brother      Coronary Artery Disease Sister 52             Hyperlipidemia Sister      Coronary Artery Disease Brother 62     Hyperlipidemia Brother      Coronary Artery Disease Sister      Hyperlipidemia Sister      Coronary Artery Disease Sister      Hyperlipidemia Sister      Breast Cancer Sister 80             Breast Cancer Niece      Coronary Artery Disease Sister      Coronary Artery Disease Sister        SOCIAL HISTORY:  Social History     Socioeconomic History     Marital status:      Spouse name: None     Number of children: 0     Years of education: None     Highest education level: None   Occupational History     Occupation: Sales   Social Needs     Financial resource strain: None     Food insecurity     Worry: None     Inability: None     Transportation needs     Medical: None     Non-medical: None   Tobacco Use     Smoking status: Former Smoker     Years: 30.00     Types: Cigarettes     Start date: 1990     Quit date: 2018     Years since quittin.6     Smokeless tobacco: Never Used     Tobacco comment:  2-3 cigs per day off and on   Substance and Sexual Activity     Alcohol use: Yes     Alcohol/week: 0.0 standard drinks     Comment: rare     Drug use: No     Sexual activity: Not Currently     Birth control/protection: Post-menopausal     Comment: postmenopausal   Lifestyle     Physical activity     Days per week: None     Minutes per session: None     Stress: None   Relationships     Social connections     Talks on phone: None     Gets together: None      "Attends Roman Catholic service: None     Active member of club or organization: None     Attends meetings of clubs or organizations: None     Relationship status: None     Intimate partner violence     Fear of current or ex partner: None     Emotionally abused: None     Physically abused: None     Forced sexual activity: None   Other Topics Concern      Service Not Asked     Blood Transfusions Not Asked     Caffeine Concern No     Comment: 1 a day. mostly decaf     Occupational Exposure Not Asked     Hobby Hazards Not Asked     Sleep Concern Not Asked     Stress Concern Not Asked     Weight Concern Not Asked     Special Diet Not Asked     Back Care Not Asked     Exercise No     Comment: likes to walk     Bike Helmet Not Asked     Seat Belt Not Asked     Self-Exams Not Asked     Parent/sibling w/ CABG, MI or angioplasty before 65F 55M? Yes   Social History Narrative     None       Review of Systems:  Skin:  Negative     Eyes:  Positive for glasses  ENT:  Negative    Respiratory:  Positive for dyspnea on exertion  Cardiovascular:  Negative Positive for;chest pain;exercise intolerance;dizziness(15 minutes after taking a walk. burning feeling)  Gastroenterology: Positive for heartburn(improved)  Genitourinary:  not assessed    Musculoskeletal:  Negative    Neurologic:  Negative    Psychiatric:  Negative    Heme/Lymph/Imm:  Negative    Endocrine:  Negative      Physical Exam:  Vitals: BP (!) 160/85   Pulse 83   Ht 1.791 m (5' 10.5\")   Wt 76.2 kg (168 lb)   BMI 23.76 kg/m      Constitutional:           Skin:             Head:           Eyes:           Lymph:      ENT:           Neck:           Respiratory:            Cardiac:                                                           GI:           Extremities and Muscular Skeletal:                 Neurological:           Psych:           Recent Lab Results:  LIPID RESULTS:  Lab Results   Component Value Date    CHOL 168 12/10/2019    HDL 64 12/10/2019    LDL 85 " 12/10/2019    TRIG 94 12/10/2019    CHOLHDLRATIO 2.6 10/30/2015       LIVER ENZYME RESULTS:  Lab Results   Component Value Date    AST 24 04/10/2021    ALT 32 04/10/2021       CBC RESULTS:  Lab Results   Component Value Date    WBC 7.3 04/10/2021    RBC 4.52 04/10/2021    HGB 13.8 04/10/2021    HCT 42.5 04/10/2021    MCV 94 04/10/2021    MCH 30.5 04/10/2021    MCHC 32.5 04/10/2021    RDW 14.5 04/10/2021     04/10/2021       BMP RESULTS:  Lab Results   Component Value Date     04/10/2021    POTASSIUM 4.9 04/10/2021    CHLORIDE 109 04/10/2021    CO2 27 04/10/2021    ANIONGAP 3 04/10/2021     (H) 04/10/2021    BUN 19 04/10/2021    CR 0.82 04/10/2021    GFRESTIMATED 70 04/10/2021    GFRESTBLACK 82 04/10/2021    ASHLEY 9.2 04/10/2021        A1C RESULTS:  Lab Results   Component Value Date    A1C 5.7 (H) 03/22/2021       INR RESULTS:  No results found for: INR    Thank you for allowing me to participate in the care of your patient.      Sincerely,     DR ERNESTINA PACE MD     [facility]    cc:   No referring provider defined for this encounter.

## 2021-04-26 ENCOUNTER — HOSPITAL ENCOUNTER (OUTPATIENT)
Dept: CARDIOLOGY | Facility: CLINIC | Age: 76
End: 2021-04-26
Attending: INTERNAL MEDICINE
Payer: COMMERCIAL

## 2021-04-26 VITALS
BODY MASS INDEX: 23.74 KG/M2 | HEART RATE: 94 BPM | WEIGHT: 165.8 LBS | OXYGEN SATURATION: 99 % | SYSTOLIC BLOOD PRESSURE: 102 MMHG | DIASTOLIC BLOOD PRESSURE: 70 MMHG | HEIGHT: 70 IN

## 2021-04-26 DIAGNOSIS — R07.2 PRECORDIAL PAIN: ICD-10-CM

## 2021-04-26 DIAGNOSIS — I73.9 PVD (PERIPHERAL VASCULAR DISEASE) (H): ICD-10-CM

## 2021-04-26 LAB
CV STRESS MAX HR HE: 117
NUC STRESS EJECTION FRACTION: 72 %
RATE PRESSURE PRODUCT: NORMAL
STRESS ANGINA INDEX: 0
STRESS ECHO BASELINE DIASTOLIC HE: 70
STRESS ECHO BASELINE HR: 100
STRESS ECHO BASELINE SYSTOLIC BP: 100
STRESS ECHO CALCULATED PERCENT HR: 80 %
STRESS ECHO LAST STRESS DIASTOLIC BP: 66
STRESS ECHO LAST STRESS SYSTOLIC BP: 140
STRESS ECHO POST ESTIMATED WORKLOAD: 7 METS
STRESS ECHO POST EXERCISE DUR MIN: 4 MIN
STRESS ECHO POST EXERCISE DUR SEC: 20 SEC
STRESS ECHO TARGET HR: 146

## 2021-04-26 PROCEDURE — 93018 CV STRESS TEST I&R ONLY: CPT | Performed by: INTERNAL MEDICINE

## 2021-04-26 PROCEDURE — 93017 CV STRESS TEST TRACING ONLY: CPT

## 2021-04-26 PROCEDURE — A9502 TC99M TETROFOSMIN: HCPCS | Performed by: INTERNAL MEDICINE

## 2021-04-26 PROCEDURE — 93016 CV STRESS TEST SUPVJ ONLY: CPT | Performed by: INTERNAL MEDICINE

## 2021-04-26 PROCEDURE — 343N000001 HC RX 343: Performed by: INTERNAL MEDICINE

## 2021-04-26 PROCEDURE — 78452 HT MUSCLE IMAGE SPECT MULT: CPT | Mod: 26 | Performed by: INTERNAL MEDICINE

## 2021-04-26 RX ADMIN — TETROFOSMIN 9.85 MCI.: 1.38 INJECTION, POWDER, LYOPHILIZED, FOR SOLUTION INTRAVENOUS at 11:09

## 2021-04-26 RX ADMIN — TETROFOSMIN 3.55 MCI.: 1.38 INJECTION, POWDER, LYOPHILIZED, FOR SOLUTION INTRAVENOUS at 09:50

## 2021-04-26 ASSESSMENT — MIFFLIN-ST. JEOR: SCORE: 1332.31

## 2021-04-30 ENCOUNTER — OFFICE VISIT (OUTPATIENT)
Dept: OTHER | Facility: CLINIC | Age: 76
End: 2021-04-30
Attending: INTERNAL MEDICINE
Payer: COMMERCIAL

## 2021-04-30 VITALS
WEIGHT: 165 LBS | HEIGHT: 70 IN | RESPIRATION RATE: 18 BRPM | OXYGEN SATURATION: 99 % | HEART RATE: 88 BPM | BODY MASS INDEX: 23.62 KG/M2 | SYSTOLIC BLOOD PRESSURE: 127 MMHG | DIASTOLIC BLOOD PRESSURE: 77 MMHG

## 2021-04-30 DIAGNOSIS — I10 ESSENTIAL HYPERTENSION: ICD-10-CM

## 2021-04-30 DIAGNOSIS — R73.01 IFG (IMPAIRED FASTING GLUCOSE): ICD-10-CM

## 2021-04-30 DIAGNOSIS — E78.5 HYPERLIPIDEMIA LDL GOAL <70: ICD-10-CM

## 2021-04-30 DIAGNOSIS — I73.9 CLAUDICATION (H): ICD-10-CM

## 2021-04-30 DIAGNOSIS — K21.9 GASTROESOPHAGEAL REFLUX DISEASE WITHOUT ESOPHAGITIS: Primary | ICD-10-CM

## 2021-04-30 PROCEDURE — G0463 HOSPITAL OUTPT CLINIC VISIT: HCPCS

## 2021-04-30 PROCEDURE — 99214 OFFICE O/P EST MOD 30 MIN: CPT | Performed by: INTERNAL MEDICINE

## 2021-04-30 RX ORDER — OMEPRAZOLE 40 MG/1
40 CAPSULE, DELAYED RELEASE ORAL DAILY
Qty: 90 CAPSULE | Refills: 3 | Status: SHIPPED | OUTPATIENT
Start: 2021-04-30 | End: 2021-11-22

## 2021-04-30 ASSESSMENT — MIFFLIN-ST. JEOR: SCORE: 1328.69

## 2021-04-30 NOTE — PROGRESS NOTES
Ramya Hillman is a 76 year old female who is presenting at the current time to discuss her diagnosi(es) of        Claudication (H)  Hyperlipidemia LDL goal <70  IFG (impaired fasting glucose)  Essential hypertension  Gastroesophageal reflux disease without esophagitis  .      HPI: Ramya Hillman is a 75 year old female who was seen today regarding bilateral lower leg claudication.  She is a former smoker and who quit smoking all together in 8/2019 after her appointment with Dr. Salinas, who has since moved.  When seen on 9/17/2020, the patient could walk about 4 blocks/15 minutes before she would get cramping calf pain that resolves with short rest. This distance is very repeatable. She had improved since last year when she could only walk for 2 blocks and four minutes.  She denied chest pain and no stroke or TIA symptoms. Currently, she does not feel her lifestyle is severely limited by her symptoms.  She had bunion surgery on the right foot in 8/2019 which healed well. She has RLE digital issues with hammer toes which she wish to have corrected surgically with Dr. Sanchez. Her ZOILA's are currently  0.56 (had been 0.66 in 8/2019) on the right and 0.95 (had been 0.89 8/2019) on the left, with digit pressures > 60 mmHg bilaterally, thereby indicating she would heal hammer toe corrective surgery.  The patient is also denying rest pain and no tissue loss noted.      On simvastatin 20 mg daily, she was not at LDL goal < 70, and was switched to Crestor 40 mg daily. She returned for lab review in December. 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 Zetia 10 mg daily was added to Crestor when previously seen. Her LDL is unchanged. Her Lp(a) and CRP are still elevated. She admits she did not take the zetia. She agreed to take it for three months to ascertain if it would decrease her Lp(a) and cardiac CRP but then changed her mind. Her goal is to be well but not perfectly managed  so she can be on fewer meds.      She was hypertensive when last seen and was started on losartan 50 mg daily. She had atypical CP, presented to the UC, and was also put on losartan-HCTX, which she took in addition to the losartan. On that combination she was developing orthostatic sxs and stopped the losartan/HCTZ, remaining just on the losartan. On that regimen her BP is now well controlled.     She was referred to cardiology due to  the CP. A Lexiscan was performed revealing no inducible ischemia, infarcts, or decreased LV fcn.     She also had GERD sxs in UC, was prescribed Prilosec which helped..    Review Of Systems  Skin: negative  Eyes: negative  Ears/Nose/Throat: negative  Respiratory: No shortness of breath, dyspnea on exertion, cough, or hemoptysis  Cardiovascular: negative  Gastrointestinal: negative  Genitourinary: negative  Musculoskeletal: negative  Neurologic: negative  Psychiatric: negative  Hematologic/Lymphatic/Immunologic: negative  Endocrine: negative      PAST MEDICAL HISTORY:                  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)      Family history of heart disease      Hyperlipidemia      PVD (peripheral vascular disease) (H)      Tobacco abuse        PAST SURGICAL HISTORY:                  Past Surgical History:   Procedure Laterality Date     BIOPSY  3/1/2018    Mole on back     BUNIONECTOMY Right 8/26/2019    Procedure: BUNIONECTOMY RIGHT FOOT;  Surgeon: Rajni Sanchez MD;  Location: [facility] OR     COLONOSCOPY  10/18/2018    1 polyp - all clear     NO HISTORY OF SURGERY       ORTHOPEDIC SURGERY  1/1/2012    Rotator Cuff - Shoulder       CURRENT MEDICATIONS:                  Current Outpatient Medications    Medication Sig Dispense Refill     aspirin 81 MG tablet Take by mouth daily 1 tablet      Cholecalciferol (VITAMIN D) 1000 UNITS capsule Take 1 capsule by mouth daily       Coenzyme Q10 (CO Q10) 100 MG CAPS Take by mouth daily       losartan (COZAAR) 50 MG tablet Take 1 tablet (50 mg) by mouth daily 30 tablet 11     multivitamin, therapeutic with minerals (MULTI-VITAMIN) TABS Take 1 tablet by mouth daily       Omega-3 Fatty Acids (FISH OIL) 1000 MG CPDR Take by mouth daily       omeprazole (PRILOSEC) 40 MG DR capsule Take 1 capsule (40 mg) by mouth daily 90 capsule 3     rosuvastatin (CRESTOR) 40 MG tablet Take 1 tablet (40 mg) by mouth daily 90 tablet 3       ALLERGIES:                  Allergies   Allergen Reactions     No Known Allergies        SOCIAL HISTORY:                  Social History     Socioeconomic History     Marital status:      Spouse name: Not on file     Number of children: 0     Years of education: Not on file     Highest education level: Not on file   Occupational History     Occupation: Sales   Social Needs     Financial resource strain: Not on file     Food insecurity     Worry: Not on file     Inability: Not on file     Transportation needs     Medical: Not on file     Non-medical: Not on file   Tobacco Use     Smoking status: Former Smoker     Years: 30.00     Types: Cigarettes     Start date: 1990     Quit date: 2018     Years since quittin.6     Smokeless tobacco: Never Used     Tobacco comment:  2-3 cigs per day off and on   Substance and Sexual Activity     Alcohol use: Yes     Alcohol/week: 0.0 standard drinks     Comment: rare     Drug use: No     Sexual activity: Not Currently     Birth control/protection: Post-menopausal     Comment: postmenopausal   Lifestyle     Physical activity     Days per week: Not on file     Minutes per session: Not on file     Stress: Not on file   Relationships     Social connections     Talks on phone: Not on file     Gets together:  Not on file     Attends Church service: Not on file     Active member of club or organization: Not on file     Attends meetings of clubs or organizations: Not on file     Relationship status: Not on file     Intimate partner violence     Fear of current or ex partner: Not on file     Emotionally abused: Not on file     Physically abused: Not on file     Forced sexual activity: Not on file   Other Topics Concern      Service Not Asked     Blood Transfusions Not Asked     Caffeine Concern No     Comment: 1 a day. mostly decaf     Occupational Exposure Not Asked     Hobby Hazards Not Asked     Sleep Concern Not Asked     Stress Concern Not Asked     Weight Concern Not Asked     Special Diet Not Asked     Back Care Not Asked     Exercise No     Comment: likes to walk     Bike Helmet Not Asked     Seat Belt Not Asked     Self-Exams Not Asked     Parent/sibling w/ CABG, MI or angioplasty before 65F 55M? Yes   Social History Narrative     Not on file       FAMILY HISTORY:                   Family History   Problem Relation Age of Onset     Cerebrovascular Disease Mother      Coronary Artery Disease Mother              Osteoporosis Mother      Hyperlipidemia Mother      Coronary Artery Disease Father 81             Hyperlipidemia Father      Coronary Artery Disease Brother              Hyperlipidemia Brother      Coronary Artery Disease Sister 52             Hyperlipidemia Sister      Coronary Artery Disease Brother 62     Hyperlipidemia Brother      Coronary Artery Disease Sister      Hyperlipidemia Sister      Coronary Artery Disease Sister      Hyperlipidemia Sister      Breast Cancer Sister 80             Breast Cancer Niece      Coronary Artery Disease Sister      Coronary Artery Disease Sister            Physical exam Reveals:    O/P: WNL  HEENT: WNL  NECK: No JVD, thyromegaly, or lymphadenopathy  HEART: RRR, no murmurs, gallops, or rubs  LUNGS: CTA bilaterally  without rales, wheezes, or rhonchi  GI: NABS, nondistended, nontender, soft  EXT:without cyanosis, clubbing, or edema  NEURO: nonfocal  : no flank tenderness      A/P:    (K21.9) Gastroesophageal reflux disease without esophagitis  (primary encounter diagnosis)  Comment: I told her she would in the future need this refilled by her new PCP.   Plan: omeprazole (PRILOSEC) 40 MG DR capsule           (I73.9) Claudication (H)  Comment: not lifestyle limiting. Advised continued walking. Obtain below in one year. Present to clinic sooner if claudication worsen, or CLI sxs develop.  Plan: US ZOILA Doppler with Exercise Bilateral           (E78.5) Hyperlipidemia LDL goal <70  Comment: She only wants  To remian on a statin  Plan: T3 Free, T4 free, TSH, LipoFit by NMR, CRP         cardiac risk, Lipoprotein (a)           (R73.01) IFG (impaired fasting glucose)  Comment: avoid CHO  Plan: Hemoglobin A1c, Albumin Random Urine         Quantitative with Creat Ratio           (I10) Essential hypertension  Comment: at goal  Plan: stay on losartan w/o HCTZ

## 2021-04-30 NOTE — PROGRESS NOTES
"Ramya Hillman is a 76 year old female who presents for:  Chief Complaint   Patient presents with     RECHECK       30 minute in clinic blood pressure check. - No labs - F/u to 4/6/21 visit *LMB 04/06/21        Vitals:    Vitals:    04/30/21 0840 04/30/21 0841   BP: 124/73 127/77   BP Location: Right arm Left arm   Patient Position: Chair Chair   Cuff Size: Adult Regular Adult Regular   Pulse: 88    Resp: 18    SpO2: 99%    Weight: 165 lb (74.8 kg)    Height: 5' 10\" (1.778 m)        BMI:  Estimated body mass index is 23.68 kg/m  as calculated from the following:    Height as of this encounter: 5' 10\" (1.778 m).    Weight as of this encounter: 165 lb (74.8 kg).    Pain Score:  Data Unavailable        Kelly Lora, [facility]    "

## 2021-06-08 ENCOUNTER — OFFICE VISIT (OUTPATIENT)
Dept: URGENT CARE | Facility: URGENT CARE | Age: 76
End: 2021-06-08
Payer: COMMERCIAL

## 2021-06-08 VITALS — SYSTOLIC BLOOD PRESSURE: 143 MMHG | DIASTOLIC BLOOD PRESSURE: 78 MMHG | HEART RATE: 84 BPM | TEMPERATURE: 98 F

## 2021-06-08 DIAGNOSIS — R21 RASH: Primary | ICD-10-CM

## 2021-06-08 PROCEDURE — 99213 OFFICE O/P EST LOW 20 MIN: CPT | Performed by: FAMILY MEDICINE

## 2021-06-08 RX ORDER — NYSTATIN 100000 U/G
CREAM TOPICAL 2 TIMES DAILY
Qty: 30 G | Refills: 1 | Status: SHIPPED | OUTPATIENT
Start: 2021-06-08 | End: 2021-06-22

## 2021-06-08 RX ORDER — CEPHALEXIN 500 MG/1
500 CAPSULE ORAL 2 TIMES DAILY
Qty: 20 CAPSULE | Refills: 0 | Status: SHIPPED | OUTPATIENT
Start: 2021-06-08 | End: 2021-06-18

## 2021-06-08 NOTE — PROGRESS NOTES
SUBJECTIVE: Ramya Hillman is a 76 year old female presenting with a chief complaint of rash between glutes.  Onset of symptoms was day(s) ago.  Course of illness is worsening.      Current and Associated symptoms: tenderness  Treatment measures tried include hydrocortisone.  Predisposing factors include None.    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)      Family history of heart disease      Hyperlipidemia      PVD (peripheral vascular disease) (H)      Tobacco abuse      Allergies   Allergen Reactions     No Known Allergies      Social History     Tobacco Use     Smoking status: Former Smoker     Years: 30.00     Types: Cigarettes     Start date: 1990     Quit date: 2018     Years since quittin.8     Smokeless tobacco: Never Used     Tobacco comment:  2-3 cigs per day off and on   Substance Use Topics     Alcohol use: Yes     Alcohol/week: 0.0 standard drinks     Comment: rare       ROS:  SKIN: no rash  GI: no vomiting    OBJECTIVE:  BP (!) 143/78   Pulse 84   Temp 98  F (36.7  C) (Tympanic) GENERAL APPEARANCE: healthy, alert and no distress  SKIN: mildly red rash between glutes      ICD-10-CM    1. Rash  R21 nystatin (MYCOSTATIN) 675263 UNIT/GM external cream     cephALEXin (KEFLEX) 500 MG capsule       Fluids/Rest, f/u if worse/not any better

## 2021-09-05 ENCOUNTER — HEALTH MAINTENANCE LETTER (OUTPATIENT)
Age: 76
End: 2021-09-05

## 2021-09-14 DIAGNOSIS — E78.5 HYPERLIPIDEMIA LDL GOAL <70: ICD-10-CM

## 2021-09-14 DIAGNOSIS — I73.9 CLAUDICATION (H): ICD-10-CM

## 2021-09-14 NOTE — TELEPHONE ENCOUNTER
[facility]     Who is the name of the provider? Dr Wolf     What is the location you see this provider at?  [place]     Reason for call:  Pt needs a refill on her rosuvastatin (CRESTOR) 40 MG tablet    : Ramya     Phone number to call:  [phone]    Additional Notes: LOV 04/30/21

## 2021-09-15 RX ORDER — ROSUVASTATIN CALCIUM 40 MG/1
40 TABLET, COATED ORAL DAILY
Qty: 90 TABLET | Refills: 2 | Status: SHIPPED | OUTPATIENT
Start: 2021-09-15 | End: 2022-04-21

## 2021-09-15 NOTE — TELEPHONE ENCOUNTER
"rosuvastatin (CRESTOR) 40 MG tablet  Last Written Prescription Date:  9/17/20  Last Fill Quantity: 90,  # refills: 3     Last office visit: 4/30/2021   Follow up due:  March/April 2022    Requested Prescriptions   Pending Prescriptions Disp Refills     rosuvastatin (CRESTOR) 40 MG tablet 90 tablet 2     Sig: Take 1 tablet (40 mg) by mouth daily       Statins Protocol Passed - 9/15/2021  8:17 AM        Passed - LDL on file in past 12 months     Recent Labs   Lab Test 12/10/19  1020   LDL 85             Passed - No abnormal creatine kinase in past 12 months     No lab results found.             Passed - Recent (12 mo) or future (30 days) visit within the authorizing provider's specialty     Patient has had an office visit with the authorizing provider or a provider within the authorizing providers department within the previous 12 mos or has a future within next 30 days. See \"Patient Info\" tab in inbasket, or \"Choose Columns\" in Meds & Orders section of the refill encounter.              Passed - Medication is active on med list        Passed - Patient is age 18 or older        Passed - No active pregnancy on record        Passed - No positive pregnancy test in past 12 months           Prescription approved per [facility] Refill Protocol.  Mariah Ybarra RN BSN  [facility]  [phone]      "

## 2021-10-14 ENCOUNTER — TRANSFERRED RECORDS (OUTPATIENT)
Dept: HEALTH INFORMATION MANAGEMENT | Facility: CLINIC | Age: 76
End: 2021-10-14
Payer: COMMERCIAL

## 2021-10-29 ENCOUNTER — OFFICE VISIT (OUTPATIENT)
Dept: URGENT CARE | Facility: URGENT CARE | Age: 76
End: 2021-10-29
Payer: COMMERCIAL

## 2021-10-29 VITALS
SYSTOLIC BLOOD PRESSURE: 156 MMHG | HEART RATE: 94 BPM | WEIGHT: 165 LBS | RESPIRATION RATE: 16 BRPM | OXYGEN SATURATION: 99 % | DIASTOLIC BLOOD PRESSURE: 90 MMHG | BODY MASS INDEX: 23.68 KG/M2

## 2021-10-29 DIAGNOSIS — L29.9 ITCHY SCALP: Primary | ICD-10-CM

## 2021-10-29 PROCEDURE — 99213 OFFICE O/P EST LOW 20 MIN: CPT | Performed by: FAMILY MEDICINE

## 2021-10-29 RX ORDER — KETOCONAZOLE 20 MG/ML
SHAMPOO TOPICAL DAILY PRN
Qty: 120 ML | Refills: 0 | Status: SHIPPED | OUTPATIENT
Start: 2021-10-29 | End: 2021-11-28

## 2021-10-29 NOTE — PROGRESS NOTES
SUBJECTIVE: Ramya Hillman is a 76 year old female presenting with a chief complaint of scalp itching.  Onset of symptoms was 1 year(s) ago.  Course of illness is waxing and waning.    Current and Associated symptoms: none  Treatment measures tried include otc  Predisposing factors include None.    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)      Family history of heart disease      Hyperlipidemia      PVD (peripheral vascular disease) (H)      Tobacco abuse      Allergies   Allergen Reactions     No Known Allergies      Social History     Tobacco Use     Smoking status: Former Smoker     Years: 30.00     Types: Cigarettes     Start date: 1/1/1990     Quit date: 8/19/2018     Years since quitting: 3.1     Smokeless tobacco: Never Used     Tobacco comment:  2-3 cigs per day off and on   Substance Use Topics     Alcohol use: Yes     Alcohol/week: 0.0 standard drinks     Comment: rare       ROS:  SKIN: no rash  GI: no vomiting    OBJECTIVE:  BP (!) 156/90   Pulse 94   Resp 16   Wt 74.8 kg (165 lb)   SpO2 99%   BMI 23.68 kg/m  GENERAL APPEARANCE: healthy, alert and no distress  EYES: EOMI,  PERRL, conjunctiva clear  SKIN: no suspicious lesions or rashes      ICD-10-CM    1. Itchy scalp  L29.9 ketoconazole (NIZORAL) 2 % external shampoo       Fluids/Rest, f/u if worse/not any better

## 2021-11-22 ENCOUNTER — OFFICE VISIT (OUTPATIENT)
Dept: FAMILY MEDICINE | Facility: CLINIC | Age: 76
End: 2021-11-22
Payer: COMMERCIAL

## 2021-11-22 VITALS
BODY MASS INDEX: 23.48 KG/M2 | HEART RATE: 94 BPM | OXYGEN SATURATION: 99 % | TEMPERATURE: 97.4 F | WEIGHT: 164 LBS | DIASTOLIC BLOOD PRESSURE: 83 MMHG | HEIGHT: 70 IN | RESPIRATION RATE: 16 BRPM | SYSTOLIC BLOOD PRESSURE: 133 MMHG

## 2021-11-22 DIAGNOSIS — J34.89 NASAL SORE: ICD-10-CM

## 2021-11-22 DIAGNOSIS — I73.9 CLAUDICATION (H): ICD-10-CM

## 2021-11-22 DIAGNOSIS — E78.5 HYPERLIPIDEMIA LDL GOAL <100: Primary | ICD-10-CM

## 2021-11-22 DIAGNOSIS — K21.00 GASTROESOPHAGEAL REFLUX DISEASE WITH ESOPHAGITIS WITHOUT HEMORRHAGE: ICD-10-CM

## 2021-11-22 DIAGNOSIS — I73.9 PAD (PERIPHERAL ARTERY DISEASE) (H): ICD-10-CM

## 2021-11-22 DIAGNOSIS — I25.750 CORONARY ARTERY DISEASE INVOLVING NATIVE ARTERY OF TRANSPLANTED HEART WITH UNSTABLE ANGINA PECTORIS (H): ICD-10-CM

## 2021-11-22 DIAGNOSIS — Z87.891 FORMER SMOKER: ICD-10-CM

## 2021-11-22 DIAGNOSIS — K52.9 CHRONIC DIARRHEA: ICD-10-CM

## 2021-11-22 PROCEDURE — 99214 OFFICE O/P EST MOD 30 MIN: CPT | Performed by: INTERNAL MEDICINE

## 2021-11-22 ASSESSMENT — MIFFLIN-ST. JEOR: SCORE: 1324.15

## 2021-11-22 NOTE — PROGRESS NOTES
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 2018 cscope reviewed.  Recommend GI eval and recs  - Adult Gastro Ref - Consult Only    Nasal sore  Agree with TID x 7 days mupirocin and keep derm eval as scheduled if not improving or worsening.      Return in about 1 month (around 12/22/2021) for Routine preventive, with me, in person, sooner if symptoms worsen or do not improve.    Abena Maddox DO  [facility] RALPH Bui is a 76 year old who presents for the following health issues     HPI     Establish care  Sore on the inside of the nose   Diarrhea concerns, chronic    Former PCP: Alda  Specialists: derm (MARCELINO); cardio (Dr Alvarez); Dr Wolf (vascular)  Problem list and medications reviewed and updated. See below for additional notes.  Social: quit smoking (2 years ago); no etoh    GERD: stopped PPI. States started earlier this year for heartburn. Thought it was heart attack and went to ER. Took PPI for short time and helped. Stopped cause thought she was better. Has not had recurrence of heartburn.    Stomach issues: intermittent diarrhea. Lately, last 2-3 weeks,  loose stools. Usually soon after eating. Once had an accident with incontinence. States this is not unusual for her, happens on and off chronically. Urgency. Has seen GI years ago at [place], nothing was detected per patient. No fevers/chills. No unusual food or recent travel. No relation to food. No abd pain. No \"warning symptoms\" per patient. No n/v.   Cscope 2018, does every 5 years-polyps and diverticula.     Itchy scalp-working with derm, " "states getting better.     Nasal sore, R nostril. Has had in the past, gets every 2 years or so, has pending derm appt and wants to make sure ok to use mupirocin TID rx she has, states it is helping.    CAD- follows cardio and vascular. No hx of MI/stents. Claudication. On asa/statin    Review of Systems   Constitutional, HEENT, cardiovascular, pulmonary, gi and gu systems are negative, except as otherwise noted.      Objective    /83 (BP Location: Right arm, Patient Position: Sitting, Cuff Size: Adult Regular)   Pulse 94   Temp 97.4  F (36.3  C) (Temporal)   Resp 16   Ht 1.778 m (5' 10\")   Wt 74.4 kg (164 lb)   SpO2 99%   BMI 23.53 kg/m    Body mass index is 23.53 kg/m .  Physical Exam     GENERAL APPEARANCE: AAOx3, no distress. Well developed.    PSYCH: appropriate mood and affect.               "

## 2021-12-13 ENCOUNTER — TRANSFERRED RECORDS (OUTPATIENT)
Dept: MULTI SPECIALTY CLINIC | Facility: CLINIC | Age: 76
End: 2021-12-13

## 2021-12-15 ENCOUNTER — TELEPHONE (OUTPATIENT)
Dept: FAMILY MEDICINE | Facility: CLINIC | Age: 76
End: 2021-12-15
Payer: COMMERCIAL

## 2021-12-16 ASSESSMENT — ENCOUNTER SYMPTOMS
DIZZINESS: 0
FREQUENCY: 0
DYSURIA: 0
HEMATURIA: 0
COUGH: 0
PARESTHESIAS: 0
ARTHRALGIAS: 0
NAUSEA: 0
CHILLS: 0
ABDOMINAL PAIN: 0
BREAST MASS: 0
JOINT SWELLING: 0
PALPITATIONS: 0
DIARRHEA: 0
EYE PAIN: 0
FEVER: 0
MYALGIAS: 0
NERVOUS/ANXIOUS: 0
HEARTBURN: 0
HEADACHES: 0
HEMATOCHEZIA: 0
SHORTNESS OF BREATH: 0
SORE THROAT: 0
WEAKNESS: 0
CONSTIPATION: 0

## 2021-12-16 ASSESSMENT — ACTIVITIES OF DAILY LIVING (ADL): CURRENT_FUNCTION: NO ASSISTANCE NEEDED

## 2021-12-21 ENCOUNTER — OFFICE VISIT (OUTPATIENT)
Dept: FAMILY MEDICINE | Facility: CLINIC | Age: 76
End: 2021-12-21
Payer: COMMERCIAL

## 2021-12-21 VITALS
WEIGHT: 167 LBS | RESPIRATION RATE: 18 BRPM | SYSTOLIC BLOOD PRESSURE: 134 MMHG | TEMPERATURE: 95.7 F | OXYGEN SATURATION: 98 % | HEIGHT: 70 IN | BODY MASS INDEX: 23.91 KG/M2 | HEART RATE: 87 BPM | DIASTOLIC BLOOD PRESSURE: 80 MMHG

## 2021-12-21 DIAGNOSIS — E78.5 HYPERLIPIDEMIA LDL GOAL <100: ICD-10-CM

## 2021-12-21 DIAGNOSIS — I25.750 CORONARY ARTERY DISEASE INVOLVING NATIVE ARTERY OF TRANSPLANTED HEART WITH UNSTABLE ANGINA PECTORIS (H): ICD-10-CM

## 2021-12-21 DIAGNOSIS — Z00.00 ROUTINE HISTORY AND PHYSICAL EXAMINATION OF ADULT: Primary | ICD-10-CM

## 2021-12-21 DIAGNOSIS — Z23 NEED FOR VACCINATION: ICD-10-CM

## 2021-12-21 DIAGNOSIS — I73.9 PVD (PERIPHERAL VASCULAR DISEASE) (H): ICD-10-CM

## 2021-12-21 DIAGNOSIS — L98.9 SKIN LESIONS, GENERALIZED: ICD-10-CM

## 2021-12-21 DIAGNOSIS — I73.9 PAD (PERIPHERAL ARTERY DISEASE) (H): ICD-10-CM

## 2021-12-21 DIAGNOSIS — I73.9 CLAUDICATION (H): ICD-10-CM

## 2021-12-21 DIAGNOSIS — Z87.891 FORMER SMOKER: ICD-10-CM

## 2021-12-21 DIAGNOSIS — Z87.891 PERSONAL HISTORY OF TOBACCO USE: ICD-10-CM

## 2021-12-21 DIAGNOSIS — Z23 NEED FOR PROPHYLACTIC VACCINATION AND INOCULATION AGAINST INFLUENZA: ICD-10-CM

## 2021-12-21 PROCEDURE — 90662 IIV NO PRSV INCREASED AG IM: CPT | Performed by: INTERNAL MEDICINE

## 2021-12-21 PROCEDURE — G0008 ADMIN INFLUENZA VIRUS VAC: HCPCS | Performed by: INTERNAL MEDICINE

## 2021-12-21 PROCEDURE — 99397 PER PM REEVAL EST PAT 65+ YR: CPT | Mod: 25 | Performed by: INTERNAL MEDICINE

## 2021-12-21 ASSESSMENT — ENCOUNTER SYMPTOMS
CONSTIPATION: 0
HEMATOCHEZIA: 0
COUGH: 0
BREAST MASS: 0
ABDOMINAL PAIN: 0
DYSURIA: 0
ARTHRALGIAS: 0
EYE PAIN: 0
HEARTBURN: 0
PALPITATIONS: 0
DIARRHEA: 0
CHILLS: 0
JOINT SWELLING: 0
SORE THROAT: 0
WEAKNESS: 0
MYALGIAS: 0
PARESTHESIAS: 0
NERVOUS/ANXIOUS: 0
HEMATURIA: 0
NAUSEA: 0
SHORTNESS OF BREATH: 0
DIZZINESS: 0
FREQUENCY: 0
FEVER: 0
HEADACHES: 0

## 2021-12-21 ASSESSMENT — MIFFLIN-ST. JEOR: SCORE: 1337.76

## 2021-12-21 ASSESSMENT — ACTIVITIES OF DAILY LIVING (ADL): CURRENT_FUNCTION: NO ASSISTANCE NEEDED

## 2021-12-21 ASSESSMENT — PAIN SCALES - GENERAL: PAINLEVEL: NO PAIN (0)

## 2021-12-21 NOTE — PROGRESS NOTES
"SUBJECTIVE:   Ramya Hillman is a 76 year old female who presents for Preventive Visit.      Patient has been advised of split billing requirements and indicates understanding: No  Advised but not certain she understands   Are you in the first 12 months of your Medicare coverage?  No    Healthy Habits:     In general, how would you rate your overall health?  Good    Frequency of exercise:  1 day/week    Duration of exercise:  Less than 15 minutes    Do you usually eat at least 4 servings of fruit and vegetables a day, include whole grains    & fiber and avoid regularly eating high fat or \"junk\" foods?  Yes    Taking medications regularly:  Yes    Medication side effects:  None    Ability to successfully perform activities of daily living:  No assistance needed    Home Safety:  No safety concerns identified    Hearing Impairment:  No hearing concerns    In the past 6 months, have you been bothered by leaking of urine?  No    In general, how would you rate your overall mental or emotional health?  Good      PHQ-2 Total Score: 0    Additional concerns today:  No    Do you feel safe in your environment? Yes    Have you ever done Advance Care Planning? (For example, a Health Directive, POLST, or a discussion with a medical provider or your loved ones about your wishes): No, advance care planning information given to patient to review.  Patient declined advance care planning discussion at this time.       Fall risk  Fallen 2 or more times in the past year?: No  Any fall with injury in the past year?: No (shower but no injury)    Cognitive Screening   1) Repeat 3 items (Leader, Season, Table)    2) Clock draw: NORMAL  3) 3 item recall: Recalls 3 objects  Results: 3 items recalled: COGNITIVE IMPAIRMENT LESS LIKELY    Mini-CogTM Copyright GERA King. Licensed by the author for use in [facility]; reprinted with permission (cameron@.[facility]). All rights reserved.      Do you have sleep apnea, excessive snoring or " daytime drowsiness?: no    Reviewed and updated as needed this visit by clinical staff  Tobacco  Allergies  Meds             Reviewed and updated as needed this visit by Provider               Social History     Tobacco Use     Smoking status: Former Smoker     Packs/day: 0.00     Years: 30.00     Pack years: 0.00     Types: Cigarettes     Start date: 1/1/1990     Quit date: 8/1/2018     Years since quitting: 3.3     Smokeless tobacco: Never Used     Tobacco comment:  2-3 cigs per day off and on   Substance Use Topics     Alcohol use: Yes     Alcohol/week: 0.0 standard drinks     Comment: Very minimal     If you drink alcohol do you typically have >3 drinks per day or >7 drinks per week? No    Alcohol Use 12/21/2021   Prescreen: >3 drinks/day or >7 drinks/week? -   Prescreen: >3 drinks/day or >7 drinks/week? No       Current providers sharing in care for this patient include:   Patient Care Team:  Abena Maddox DO as PCP - General (Internal Medicine)  Dominik Wolf MD as Assigned Heart and Vascular Provider  Abena Maddox DO as Assigned PCP    The following health maintenance items are reviewed in Epic and correct as of today:  Health Maintenance Due   Topic Date Due     HEPATITIS C SCREENING  Never done     LUNG CANCER SCREENING  Never done     FALL RISK ASSESSMENT  12/22/2021     Diarrhea has improved, has follow-up with Dr Soto next month for recheck.   She is interested in high dose flu vaccine today.  She had labs including lipids with vascular Dr Wolf earlier this year. Compliant with meds. She has follow-up with him early next year.  She saw her derm for scalp itching, plans to do massage of neck for possible neck/nerve etiology.    Review of Systems   Constitutional: Negative for chills and fever.   HENT: Negative for congestion, ear pain, hearing loss and sore throat.    Eyes: Negative for pain and visual disturbance.   Respiratory: Negative for cough and shortness of breath.   "  Cardiovascular: Negative for chest pain, palpitations and peripheral edema.   Gastrointestinal: Negative for abdominal pain, constipation, diarrhea, heartburn, hematochezia and nausea.   Breasts:  Negative for breast mass.   Genitourinary: Negative for dysuria, frequency, genital sores, hematuria, pelvic pain, urgency and vaginal discharge.   Musculoskeletal: Negative for arthralgias, joint swelling and myalgias.   Skin: Negative for rash.   Neurological: Negative for dizziness, weakness, headaches and paresthesias.   Psychiatric/Behavioral: Negative for mood changes. The patient is not nervous/anxious.          OBJECTIVE:   /80 (BP Location: Right arm, Patient Position: Chair, Cuff Size: Adult Regular)   Pulse 87   Temp (!) 95.7  F (35.4  C) (Temporal)   Resp 18   Ht 1.778 m (5' 10\")   Wt 75.8 kg (167 lb)   SpO2 98%   BMI 23.96 kg/m   Estimated body mass index is 23.96 kg/m  as calculated from the following:    Height as of this encounter: 1.778 m (5' 10\").    Weight as of this encounter: 75.8 kg (167 lb).  Physical Exam    GENERAL APPEARANCE: AAOx3, no distress. Well developed.    RESP: Lungs CTA bilaterally. No w/r/r. No distress     CV: RRR, S1/S2 present. No m/r/c.     ABDOMEN:  soft, nontender, no distention. No rebound or guarding.     EXT: No c/c/e in lower extremities b/l. No rashes or deformities noted.    MSK: ROM and strength intact in four extremities. No gross deformities noted.    SKIN: no suspicious lesions or rashes    NEURO: AAOx3, Motor function intact w/ 5/5 strength bilaterally to UE and LE. Sensation intact bilaterally. Speech is fluent and comprehension intact. No gait abnormalities noted.    PSYCH: appropriate mood and affect.           ASSESSMENT / PLAN:   Ramya was seen today for physical and imm/inj.    Diagnoses and all orders for this visit:    Routine history and physical examination of adult   High dose flu vaccine today    Hyperlipidemia LDL goal <100  Coronary artery " "disease involving native artery of transplanted heart with unstable angina pectoris (H)  PAD (peripheral artery disease) (H)  PVD (peripheral vascular disease) (H)  Claudication (H)  Former smoker   Following cardio/vascular.   On statin/arb/asa   Labs are UTD    Personal history of tobacco use  We reviewed lung cancer screening guidelines. Shared decision making. She would like to proceed with order, though she plans to double check with insurance first to ensure covered and if so, will schedule it.  -     Prof Fee: Shared Decision Making Visit for Lung Cancer Screening  -     CT Chest Lung Cancer Scrn Low Dose wo; Future    Need for vaccination  Need for prophylactic vaccination and inoculation against influenza    Skin lesions, generalized   Follows derm    Other orders  -     REVIEW OF HEALTH MAINTENANCE PROTOCOL ORDERS        COUNSELING:  Reviewed preventive health counseling, as reflected in patient instructions       Regular exercise       Healthy diet/nutrition    Estimated body mass index is 23.96 kg/m  as calculated from the following:    Height as of this encounter: 1.778 m (5' 10\").    Weight as of this encounter: 75.8 kg (167 lb).        She reports that she quit smoking about 3 years ago. Her smoking use included cigarettes. She started smoking about 31 years ago. She smoked 0.00 packs per day for 30.00 years. She has never used smokeless tobacco.      Appropriate preventive services were discussed with this patient, including applicable screening as appropriate for cardiovascular disease, diabetes, osteopenia/osteoporosis, and glaucoma.  As appropriate for age/gender, discussed screening for colorectal cancer, prostate cancer, breast cancer, and cervical cancer. Checklist reviewing preventive services available has been given to the patient.    Reviewed patients plan of care and provided an AVS. The Basic Care Plan (routine screening as documented in Health Maintenance) for Ramya meets the Care Plan " requirement. This Care Plan has been established and reviewed with the Patient.    Counseling Resources:  ATP IV Guidelines  Pooled Cohorts Equation Calculator  Breast Cancer Risk Calculator  Breast Cancer: Medication to Reduce Risk  FRAX Risk Assessment  ICSI Preventive Guidelines  Dietary Guidelines for Americans, 2010  USDA's MyPlate  ASA Prophylaxis  Lung CA Screening    DO LILLIAM Desouza [facility]    Identified Health Risks:  Lung Cancer Screening Shared Decision Making Visit     Ramya Hillman is eligible for lung cancer screening on the basis of the information provided in my signed lung cancer screening order.     I have discussed with patient the risks and benefits of screening for lung cancer with low-dose CT.

## 2021-12-21 NOTE — PATIENT INSTRUCTIONS

## 2022-01-05 ENCOUNTER — HOSPITAL ENCOUNTER (OUTPATIENT)
Dept: CT IMAGING | Facility: CLINIC | Age: 77
Discharge: HOME | End: 2022-01-05
Attending: INTERNAL MEDICINE | Admitting: INTERNAL MEDICINE
Payer: COMMERCIAL

## 2022-01-05 DIAGNOSIS — Z87.891 PERSONAL HISTORY OF TOBACCO USE: ICD-10-CM

## 2022-01-05 PROCEDURE — 71271 CT THORAX LUNG CANCER SCR C-: CPT

## 2022-01-06 PROBLEM — J43.9 EMPHYSEMA LUNG (H): Status: ACTIVE | Noted: 2022-01-06

## 2022-03-30 DIAGNOSIS — I10 ESSENTIAL HYPERTENSION: ICD-10-CM

## 2022-03-30 NOTE — TELEPHONE ENCOUNTER
[facility]    Who is the name of the provider?:  Dr. Wolf      What is the location you see this provider at[place]    Can we leave a detailed message on this number? n/a    Reason for call:  Needs refill for Rx Losartan. Pharmacy associated with current Rx confirmed.     Scheduled follow up for 4/21/22.       Keren Duenas    [facility] [place]   [phone]

## 2022-03-30 NOTE — TELEPHONE ENCOUNTER
Refill loaded and routed to Dr. Wolf to fill next week when he is in clinic.    Sophia MAHERN, RN    [facility]  Vascular [facility]  Office: [phone]  Fax: [phone]

## 2022-04-05 RX ORDER — LOSARTAN POTASSIUM 50 MG/1
50 TABLET ORAL DAILY
Qty: 30 TABLET | Refills: 3 | Status: SHIPPED | OUTPATIENT
Start: 2022-04-05 | End: 2022-04-21

## 2022-04-06 ENCOUNTER — LAB (OUTPATIENT)
Dept: LAB | Facility: CLINIC | Age: 77
End: 2022-04-06
Payer: COMMERCIAL

## 2022-04-06 DIAGNOSIS — Z11.59 NEED FOR HEPATITIS C SCREENING TEST: Primary | ICD-10-CM

## 2022-04-06 DIAGNOSIS — R73.01 IFG (IMPAIRED FASTING GLUCOSE): ICD-10-CM

## 2022-04-06 DIAGNOSIS — E78.5 HYPERLIPIDEMIA LDL GOAL <70: ICD-10-CM

## 2022-04-06 LAB
CREAT UR-MCNC: 101 MG/DL
CRP SERPL HS-MCNC: 1.2 MG/L
HBA1C MFR BLD: 5.7 % (ref 0–5.6)
HCV AB SERPL QL IA: NONREACTIVE
MICROALBUMIN UR-MCNC: 7 MG/L
MICROALBUMIN/CREAT UR: 6.93 MG/G CR (ref 0–25)
T3FREE SERPL-MCNC: 3.1 PG/ML (ref 2.3–4.2)
T4 FREE SERPL-MCNC: 1.14 NG/DL (ref 0.76–1.46)
TSH SERPL DL<=0.005 MIU/L-ACNC: 1.86 MU/L (ref 0.4–4)

## 2022-04-06 PROCEDURE — 84439 ASSAY OF FREE THYROXINE: CPT

## 2022-04-06 PROCEDURE — 84481 FREE ASSAY (FT-3): CPT

## 2022-04-06 PROCEDURE — 84443 ASSAY THYROID STIM HORMONE: CPT

## 2022-04-06 PROCEDURE — 83695 ASSAY OF LIPOPROTEIN(A): CPT | Mod: 90

## 2022-04-06 PROCEDURE — 83704 LIPOPROTEIN BLD QUAN PART: CPT | Mod: 90

## 2022-04-06 PROCEDURE — 99000 SPECIMEN HANDLING OFFICE-LAB: CPT

## 2022-04-06 PROCEDURE — 83036 HEMOGLOBIN GLYCOSYLATED A1C: CPT

## 2022-04-06 PROCEDURE — 36415 COLL VENOUS BLD VENIPUNCTURE: CPT

## 2022-04-06 PROCEDURE — 82043 UR ALBUMIN QUANTITATIVE: CPT

## 2022-04-06 PROCEDURE — 86803 HEPATITIS C AB TEST: CPT

## 2022-04-06 PROCEDURE — 86141 C-REACTIVE PROTEIN HS: CPT

## 2022-04-07 NOTE — RESULT ENCOUNTER NOTE
Luisa Bui,    This is to inform you regarding your test result.    I am covering for .  Hepatitis C Antibody is negative.      Sincerely,      Dr.Nasima Ricci MD,FACP

## 2022-04-08 LAB — LPA SERPL-MCNC: 45 MG/DL

## 2022-04-10 LAB
CHOLEST SERPL-MCNC: 136 MG/DL
HDL SERPL QN: 9.8 NM
HDL SERPL-SCNC: 32.4 UMOL/L
HDLC SERPL-MCNC: 76 MG/DL
HLD.LARGE SERPL-SCNC: 11.2 UMOL/L
LDL SERPL QN: 21.1 NM
LDL SERPL-SCNC: 484 NMOL/L
LDL SMALL SERPL-SCNC: <165 NMOL/L
LDLC SERPL CALC-MCNC: 42 MG/DL
PATHOLOGY STUDY: ABNORMAL
TRIGL SERPL-MCNC: 91 MG/DL
VLDL LARGE SERPL-SCNC: 2.2 NMOL/L
VLDL SERPL QN: 49.3 NM

## 2022-04-21 ENCOUNTER — HOSPITAL ENCOUNTER (OUTPATIENT)
Dept: ULTRASOUND IMAGING | Facility: CLINIC | Age: 77
Discharge: HOME | End: 2022-04-21
Attending: INTERNAL MEDICINE
Payer: COMMERCIAL

## 2022-04-21 ENCOUNTER — OFFICE VISIT (OUTPATIENT)
Dept: OTHER | Facility: CLINIC | Age: 77
End: 2022-04-21
Attending: INTERNAL MEDICINE
Payer: COMMERCIAL

## 2022-04-21 ENCOUNTER — TELEPHONE (OUTPATIENT)
Dept: OTHER | Facility: CLINIC | Age: 77
End: 2022-04-21

## 2022-04-21 VITALS — HEART RATE: 77 BPM | DIASTOLIC BLOOD PRESSURE: 83 MMHG | SYSTOLIC BLOOD PRESSURE: 154 MMHG | OXYGEN SATURATION: 100 %

## 2022-04-21 DIAGNOSIS — E78.5 HYPERLIPIDEMIA LDL GOAL <70: ICD-10-CM

## 2022-04-21 DIAGNOSIS — K21.9 GASTROESOPHAGEAL REFLUX DISEASE WITHOUT ESOPHAGITIS: ICD-10-CM

## 2022-04-21 DIAGNOSIS — I73.9 CLAUDICATION (H): ICD-10-CM

## 2022-04-21 DIAGNOSIS — R73.01 IFG (IMPAIRED FASTING GLUCOSE): ICD-10-CM

## 2022-04-21 DIAGNOSIS — I10 ESSENTIAL HYPERTENSION: ICD-10-CM

## 2022-04-21 PROCEDURE — 93922 UPR/L XTREMITY ART 2 LEVELS: CPT

## 2022-04-21 PROCEDURE — G0463 HOSPITAL OUTPT CLINIC VISIT: HCPCS

## 2022-04-21 PROCEDURE — 99214 OFFICE O/P EST MOD 30 MIN: CPT | Performed by: INTERNAL MEDICINE

## 2022-04-21 RX ORDER — ROSUVASTATIN CALCIUM 40 MG/1
40 TABLET, COATED ORAL DAILY
Qty: 90 TABLET | Refills: 2 | Status: SHIPPED | OUTPATIENT
Start: 2022-04-21 | End: 2023-04-04

## 2022-04-21 RX ORDER — LOSARTAN POTASSIUM 50 MG/1
50 TABLET ORAL DAILY
Qty: 30 TABLET | Refills: 3 | Status: SHIPPED | OUTPATIENT
Start: 2022-04-21 | End: 2022-08-24

## 2022-04-21 NOTE — PROGRESS NOTES
Patient is here to discuss one year follow up for vascular risk factor management and  fasting labs and urinalysis.    BP (!) 154/83 (BP Location: Right arm, Patient Position: Chair, Cuff Size: Adult Regular)   Pulse 77   SpO2 100%     Questions patient would like addressed today are: Results of a couple tests patient took.    Refills are needed: Yes:  Rosuvastatin and Losartin    Has homecare services and agency name:  [facility]

## 2022-04-21 NOTE — PROGRESS NOTES
Ramya Hillman is a 77 year old female who is presenting at the current time to discuss her diagnosi(es) of          Claudication (H)  Hyperlipidemia LDL goal <70  IFG (impaired fasting glucose)  Essential hypertension  Gastroesophageal reflux disease without esophagitis           HPI: Ramya Hillman is a 75 year old female who was seen today regarding bilateral lower leg claudication.  She is a former smoker and who quit smoking all together in 8/2019 after her appointment with Dr. Salinas, who has since moved.  When seen on 9/17/2020, the patient could walk about 4 blocks/15 minutes before she would get cramping calf pain that resolves with short rest. This distance is very repeatable. She had improved since last year when she could only walk for 2 blocks and four minutes.  She denied chest pain and no stroke or TIA symptoms. Currently, she does not feel her lifestyle is severely limited by her symptoms, but she is not walking regularly.    On a statin, her LDL <70, LDL-P<1000, Lp(a) moderately elevated at 45. BP today is 154/78. Her resting ZOILA is  0.58 on the right, previously 0.68 with monophasic waveforms;Her left ZOILA is 0.9, previously 0.96, with triphasic waveforms. She can currently walk 2 blocks before she develops pain in the RLE. This does not appreciably impact her ADLs. She has no nocturnal ischemic rest pain or nonhealing ulcers.      Review Of Systems  Skin: negative  Eyes: negative  Ears/Nose/Throat: negative  Respiratory: No shortness of breath, dyspnea on exertion, cough, or hemoptysis  Cardiovascular: as above  Gastrointestinal: negative  Genitourinary: negative  Musculoskeletal: as above  Neurologic: negative  Psychiatric: negative  Hematologic/Lymphatic/Immunologic: negative  Endocrine: negative      PAST MEDICAL HISTORY:                  Past Medical History:   Diagnosis Date     Arthritis 1/01/2000    Hands & feet     Chest pain      Chronic ischemic heart disease      Condyloma     had for5  years, 20 years ago,recurrence 2012 biopsy taken     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)      Emphysema lung (H) 1/6/2022     Family history of heart disease      Hyperlipidemia      PVD (peripheral vascular disease) (H)      Tobacco abuse        PAST SURGICAL HISTORY:                  Past Surgical History:   Procedure Laterality Date     BIOPSY  3/1/2018    Mole on back     BUNIONECTOMY Right 8/26/2019    Procedure: BUNIONECTOMY RIGHT FOOT;  Surgeon: Rajni Sanchez MD;  Location: [facility] OR     COLONOSCOPY  10/18/2018    1 polyp - all clear     NO HISTORY OF SURGERY       ORTHOPEDIC SURGERY  1/1/2012    Rotator Cuff - Shoulder       CURRENT MEDICATIONS:                  Current Outpatient Medications   Medication Sig Dispense Refill     aspirin 81 MG tablet Take by mouth daily 1 tablet      Cholecalciferol (VITAMIN D) 1000 UNITS capsule Take 1 capsule by mouth daily       Coenzyme Q10 (CO Q10) 100 MG CAPS Take by mouth daily       losartan (COZAAR) 50 MG tablet Take 1 tablet (50 mg) by mouth daily 30 tablet 3     multivitamin w/minerals (THERA-VIT-M) tablet Take 1 tablet by mouth daily       Omega-3 Fatty Acids (FISH OIL) 1000 MG CPDR Take by mouth daily       rosuvastatin (CRESTOR) 40 MG tablet Take 1 tablet (40 mg) by mouth daily 90 tablet 2       ALLERGIES:                  Allergies   Allergen Reactions     No Known Allergies        SOCIAL HISTORY:                  Social History     Socioeconomic History     Marital status:      Spouse name: Not on file     Number of children: 0     Years of education: Not on file     Highest education level: Not on file   Occupational History     Occupation: Sales   Tobacco Use     Smoking status: Former Smoker     Packs/day: 0.00     Years: 30.00     Pack years: 0.00     Types: Cigarettes     Start date: 1/1/1990     Quit date: 8/1/2018     Years since quitting: 3.7     Smokeless tobacco: Never Used      Tobacco comment:  2-3 cigs per day off and on   Substance and Sexual Activity     Alcohol use: Yes     Alcohol/week: 0.0 standard drinks     Comment: Very minimal     Drug use: No     Sexual activity: Not Currently     Birth control/protection: Post-menopausal     Comment: postmenopausal   Other Topics Concern      Service Not Asked     Blood Transfusions Not Asked     Caffeine Concern No     Comment: 1 a day. mostly decaf     Occupational Exposure Not Asked     Hobby Hazards Not Asked     Sleep Concern Not Asked     Stress Concern Not Asked     Weight Concern Not Asked     Special Diet Not Asked     Back Care Not Asked     Exercise No     Comment: likes to walk     Bike Helmet Not Asked     Seat Belt Not Asked     Self-Exams Not Asked     Parent/sibling w/ CABG, MI or angioplasty before 65F 55M? Yes   Social History Narrative     Not on file     Social Determinants of Health     Financial Resource Strain: Not on file   Food Insecurity: Not on file   Transportation Needs: Not on file   Physical Activity: Not on file   Stress: Not on file   Social Connections: Not on file   Intimate Partner Violence: Not on file   Housing Stability: Not on file       FAMILY HISTORY:                   Family History   Problem Relation Age of Onset     Cerebrovascular Disease Mother      Coronary Artery Disease Mother              Osteoporosis Mother      Hyperlipidemia Mother      Coronary Artery Disease Father 81             Hyperlipidemia Father      Coronary Artery Disease Brother              Hyperlipidemia Brother      Coronary Artery Disease Sister 52             Hyperlipidemia Sister      Coronary Artery Disease Brother 62     Hyperlipidemia Brother      Coronary Artery Disease Sister      Hyperlipidemia Sister      Coronary Artery Disease Sister      Hyperlipidemia Sister      Breast Cancer Sister 80             Breast Cancer Niece      Coronary Artery Disease Sister       Breast Cancer Sister      Coronary Artery Disease Sister          Physical exam Reveals:    O/P: WNL  HEENT: WNL  NECK: No JVD, thyromegaly, or lymphadenopathy  HEART: RRR, no murmurs, gallops, or rubs  LUNGS: CTA bilaterally without rales, wheezes, or rhonchi  GI: NABS, nondistended, nontender, soft  EXT:without cyanosis, clubbing, or edema  NEURO: nonfocal  : no flank tenderness      Component      Latest Ref Rng & Units 3/22/2021 4/6/2022   Total Cholesterol      <=199 mg/dL 131 136   Triglycerides      30 - 149 mg/dL 82 91   HDL Cholesterol      40 - 59 mg/dL 69 (H) 76 (H)   LDL Cholesterol      <=129 mg/dL 46 42   HDL Size      >=8.9 nm 9.9 9.8   VLDL Size      <=46.7 nm 48.8 (H) 49.3 (H)   LDL Particle Size      >=20.7 nm 21.2 21.1   Lge HDL Particle number      >=4.2 umol/L 12.0 11.2   HDL Particle Number NMR      >=33.0 umol/L 33.1 32.4 (L)   Lge VLDL Part number      <=2.7 nmol/L 1.6 2.2   Small LDL Particle number      <=634 nmol/L <165 <165   LDL Particle Number      <=1135 nmol/L [id number]   EER LipoFit by NMR       SEE NOTE See Note   Creatinine Urine      mg/dL  101   Albumin Urine mg/L      mg/L  7   Albumin Urine mg/g Cr      0.00 - 25.00 mg/g Cr  6.93   Hemoglobin A1C POCT      0 - 5.6 % 5.7 (H)    Lipoprotein (a)      <=29 mg/dL 46 (H) 45 (H)   CRP Cardiac Risk      mg/L 2.2    Free T3      2.3 - 4.2 pg/mL  3.1   T4 Free      0.76 - 1.46 ng/dL  1.14   TSH      0.40 - 4.00 mU/L  1.86   C-Reactive Protein High Sensitivity      mg/L  1.2   Hemoglobin A1C      0.0 - 5.6 %  5.7 (H)     ULTRASOUND ANKLE-BRACHIAL INDEX DOPPLER WITHOUT EXERCISE, ONE TO TWO  LEVELS, BILATERAL  4/21/2022 8:46 AM      HISTORY: Claudication (H).     COMPARISON: March 22, 2021     FINDINGS:  Right ZOILA:   PT: 98, index of 0.58, previously 0.68.  DP: 94, index of 0.56, previously 0.67.     Left ZOILA:   PT: 152, index of 0.9, previously 0.96.  DP: 142, index of 0.85, previously 0.96.      Waveforms: Distal waveforms in the right  posterior tibial and dorsalis  pedis arteries are predominantly monophasic. Distal left posterior  tibial and dorsalis pedis arteries are triphasic.     Exercise: Patient with back pain today, therefore unable to exercise.                                                                      IMPRESSION:   1. Moderate arterial insufficiency in the right lower extremity,  slightly worsened since previous exam.  2. Borderline normal/abnormal ZOILA examination in the left lower  extremity.     ZOILA CRITERIA:  >1.4 NC  0.95-1.4 Normal  0.90 - 0.94 Mild  0.5 - 0.89 Moderate  0.2 - 0.49 Severe  <0.2 Critical     ERNESTINA ELI MD            A/P:              (I73.9) Claudication (H)  Comment: not lifestyle limiting. Advised continued walking. Do not clip toenails agressively. She does not want to be on more meds and as such Pletal was not offered to her. Obtain below in one year. Present to clinic sooner if claudication worsen, or CLI sxs develop.  Plan: US ZOILA Doppler with Exercise Bilateral            (E78.5) Hyperlipidemia LDL goal <70  Comment: At LDL goal She only wants  o remian on a statin  Plan: check lipid panel in one year. Place on Lp(a) inhibitor when one becomes avialable on the market            (R73.01) IFG (impaired fasting glucose)  Comment: avoid CHO  Plan: Hemoglobin A1c in one year            (I10) Essential hypertension  Comment:not at goal  Plan: check BP daily at home, RTC one year

## 2022-04-21 NOTE — TELEPHONE ENCOUNTER
Follow-up to 4/21/22      In clinic blood pressure check around 5/21/22 with Dr. Luciano chowdary.

## 2022-04-21 NOTE — TELEPHONE ENCOUNTER
Pt has been scheduled for 1 month BP follow up with Dr. Wolf     Future Appointments   Date Time Provider Department Center   5/25/2022 11:10 AM Dominik Wolf MD [facility]

## 2022-05-25 ENCOUNTER — OFFICE VISIT (OUTPATIENT)
Dept: OTHER | Facility: CLINIC | Age: 77
End: 2022-05-25
Attending: INTERNAL MEDICINE
Payer: COMMERCIAL

## 2022-05-25 VITALS
BODY MASS INDEX: 23.1 KG/M2 | HEIGHT: 71 IN | HEART RATE: 86 BPM | DIASTOLIC BLOOD PRESSURE: 72 MMHG | SYSTOLIC BLOOD PRESSURE: 119 MMHG | WEIGHT: 165 LBS | OXYGEN SATURATION: 99 %

## 2022-05-25 DIAGNOSIS — I73.9 CLAUDICATION (H): ICD-10-CM

## 2022-05-25 DIAGNOSIS — I10 ESSENTIAL HYPERTENSION: ICD-10-CM

## 2022-05-25 DIAGNOSIS — R73.01 IFG (IMPAIRED FASTING GLUCOSE): ICD-10-CM

## 2022-05-25 DIAGNOSIS — K21.9 GASTROESOPHAGEAL REFLUX DISEASE WITHOUT ESOPHAGITIS: ICD-10-CM

## 2022-05-25 DIAGNOSIS — E78.5 HYPERLIPIDEMIA LDL GOAL <70: ICD-10-CM

## 2022-05-25 PROCEDURE — G0463 HOSPITAL OUTPT CLINIC VISIT: HCPCS

## 2022-05-25 PROCEDURE — 99214 OFFICE O/P EST MOD 30 MIN: CPT | Performed by: INTERNAL MEDICINE

## 2022-05-25 NOTE — PROGRESS NOTES
"Patient is here to discuss blood pressure check.    BP (!) 167/84 (BP Location: Left arm, Patient Position: Chair, Cuff Size: Adult Large)   Pulse 86   Ht 5' 10.5\" (1.791 m)   Wt 165 lb (74.8 kg)   SpO2 99%   BMI 23.34 kg/m      Questions patient would like addressed today are: N/A.    Refills are needed: No    Has homecare services and agency name:  [facility]  "

## 2022-05-25 NOTE — PROGRESS NOTES
Ramya Hillman is a 77 year old female who is presenting at the current time to discuss her diagnosi(es) of        Claudication (H)  Hyperlipidemia LDL goal <70  IFG (impaired fasting glucose)  Essential hypertension  Gastroesophageal reflux disease without esophagitis        HPI: Ramya Hillman is a 75 year old female who was seen today regarding bilateral lower leg claudication.  She is a former smoker and who quit smoking all together in 8/2019 after her appointment with Dr. Salinas, who has since moved.  When seen on 9/17/2020, the patient could walk about 4 blocks/15 minutes before she would get cramping calf pain that resolves with short rest. This distance is very repeatable. She had improved since last year when she could only walk for 2 blocks and four minutes.  She denied chest pain and no stroke or TIA symptoms. Currently, she does not feel her lifestyle is severely limited by her symptoms, but she is not walking regularly.     On a statin, her LDL <70, LDL-P<1000, Lp(a) moderately elevated at 45. BP today is 154/78. Her resting ZOILA is  0.58 on the right, previously 0.68 with monophasic waveforms;Her left ZOILA is 0.9, previously 0.96, with triphasic waveforms. She can currently walk 2 blocks before she develops pain in the RLE. This does not appreciably impact her ADLs. She has no nocturnal ischemic rest pain or nonhealing ulcers.       When last seen in 4/2022, her BP was elevated. Med changes were not made at that time. She was advised to RTC with a month's worth of BP readings from home. Today, it is noted her SBP has averaged 119 at home.       Review Of Systems  Skin: negative  Eyes: negative  Ears/Nose/Throat: negative sensation of right leg discomfort  Respiratory: No shortness of breath, dyspnea on exertion, cough, or hemoptysis  Cardiovascular: sensation of right leg discomfort  Gastrointestinal: negative  Genitourinary: negative  Musculoskeletal: as above  Neurologic: negative  Psychiatric:  negative  Hematologic/Lymphatic/Immunologic: negative  Endocrine: negative    PAST MEDICAL HISTORY:                  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)      Emphysema lung (H) 1/6/2022     Family history of heart disease      Hyperlipidemia      PVD (peripheral vascular disease) (H)      Tobacco abuse        PAST SURGICAL HISTORY:                  Past Surgical History:   Procedure Laterality Date     BIOPSY  3/1/2018    Mole on back     BUNIONECTOMY Right 8/26/2019    Procedure: BUNIONECTOMY RIGHT FOOT;  Surgeon: Rajni Sanchez MD;  Location: [facility] OR     COLONOSCOPY  10/18/2018    1 polyp - all clear     NO HISTORY OF SURGERY       ORTHOPEDIC SURGERY  1/1/2012    Rotator Cuff - Shoulder       CURRENT MEDICATIONS:                  Current Outpatient Medications   Medication Sig Dispense Refill     aspirin 81 MG tablet Take by mouth daily 1 tablet      Cholecalciferol (VITAMIN D) 1000 UNITS capsule Take 1 capsule by mouth daily       Coenzyme Q10 (CO Q10) 100 MG CAPS Take by mouth daily       losartan (COZAAR) 50 MG tablet Take 1 tablet (50 mg) by mouth daily 30 tablet 3     multivitamin w/minerals (THERA-VIT-M) tablet Take 1 tablet by mouth daily       Omega-3 Fatty Acids (FISH OIL) 1000 MG CPDR Take by mouth daily       rosuvastatin (CRESTOR) 40 MG tablet Take 1 tablet (40 mg) by mouth daily 90 tablet 2       ALLERGIES:                  Allergies   Allergen Reactions     No Known Allergies        SOCIAL HISTORY:                  Social History     Socioeconomic History     Marital status:      Spouse name: Not on file     Number of children: 0     Years of education: Not on file     Highest education level: Not on file    Occupational History     Occupation: Sales   Tobacco Use     Smoking status: Former Smoker     Packs/day: 0.00     Years: 30.00     Pack years: 0.00     Types: Cigarettes     Start date: 1990     Quit date: 2018     Years since quitting: 3.8     Smokeless tobacco: Never Used     Tobacco comment:  2-3 cigs per day off and on   Substance and Sexual Activity     Alcohol use: Yes     Alcohol/week: 0.0 standard drinks     Comment: Very minimal     Drug use: No     Sexual activity: Not Currently     Birth control/protection: Post-menopausal     Comment: postmenopausal   Other Topics Concern      Service Not Asked     Blood Transfusions Not Asked     Caffeine Concern No     Comment: 1 a day. mostly decaf     Occupational Exposure Not Asked     Hobby Hazards Not Asked     Sleep Concern Not Asked     Stress Concern Not Asked     Weight Concern Not Asked     Special Diet Not Asked     Back Care Not Asked     Exercise No     Comment: likes to walk     Bike Helmet Not Asked     Seat Belt Not Asked     Self-Exams Not Asked     Parent/sibling w/ CABG, MI or angioplasty before 65F 55M? Yes   Social History Narrative     Not on file     Social Determinants of Health     Financial Resource Strain: Not on file   Food Insecurity: Not on file   Transportation Needs: Not on file   Physical Activity: Not on file   Stress: Not on file   Social Connections: Not on file   Intimate Partner Violence: Not on file   Housing Stability: Not on file       FAMILY HISTORY:                   Family History   Problem Relation Age of Onset     Cerebrovascular Disease Mother      Coronary Artery Disease Mother              Osteoporosis Mother      Hyperlipidemia Mother      Coronary Artery Disease Father 81             Hyperlipidemia Father      Coronary Artery Disease Brother              Hyperlipidemia Brother      Coronary Artery Disease Sister 52             Hyperlipidemia Sister      Coronary Artery  Disease Brother 62     Hyperlipidemia Brother      Coronary Artery Disease Sister      Hyperlipidemia Sister      Coronary Artery Disease Sister      Hyperlipidemia Sister      Breast Cancer Sister 80             Breast Cancer Niece      Coronary Artery Disease Sister      Breast Cancer Sister      Coronary Artery Disease Sister              A/P:         (I73.9) Claudication (H)  Comment: not lifestyle limiting. Advised continued walking. Do not clip toenails agressively. She does not want to be on more meds and as such Pletal was not offered to her. Obtain below in one year. Present to clinic sooner if claudication worsen, or CLI sxs develop.  Plan: US ZOILA Doppler with Exercise Bilateral            (E78.5) Hyperlipidemia LDL goal <70  Comment: At LDL goal She only wants  o remian on a statin  Plan: check lipid panel in one year. Place on Lp(a) inhibitor when one becomes avialable on the market            (R73.01) IFG (impaired fasting glucose)  Comment: avoid CHO  Plan: Hemoglobin A1c in one year            (I10) Essential hypertension  Comment:at goal at home  Plan: no BP med changes, RTC one year

## 2022-08-24 DIAGNOSIS — I10 ESSENTIAL HYPERTENSION: ICD-10-CM

## 2022-08-24 RX ORDER — LOSARTAN POTASSIUM 50 MG/1
TABLET ORAL
Qty: 90 TABLET | Refills: 2 | Status: SHIPPED | OUTPATIENT
Start: 2022-08-24 | End: 2023-05-22

## 2022-08-24 NOTE — TELEPHONE ENCOUNTER
losartan (COZAAR) 50 MG tablet  Last Written Prescription Date:  4/21/22  Last Fill Quantity: 30,  # refills: 3    Last office visit: 5/25/22  Follow up recommended:  5/2023       Unable to fill per [facility] protocol.  Medication and pharmacy loaded.     Angiotensin-II Receptors Failed 08/24/2022 09:39 AM   Protocol Details  Normal serum creatinine on file in past 12 months    Normal serum potassium on file in past 12 months

## 2022-10-23 ENCOUNTER — HEALTH MAINTENANCE LETTER (OUTPATIENT)
Age: 77
End: 2022-10-23

## 2022-11-14 ENCOUNTER — OFFICE VISIT (OUTPATIENT)
Dept: URGENT CARE | Facility: URGENT CARE | Age: 77
End: 2022-11-14
Payer: COMMERCIAL

## 2022-11-14 VITALS
DIASTOLIC BLOOD PRESSURE: 70 MMHG | OXYGEN SATURATION: 98 % | HEART RATE: 89 BPM | SYSTOLIC BLOOD PRESSURE: 134 MMHG | RESPIRATION RATE: 16 BRPM | TEMPERATURE: 96.8 F

## 2022-11-14 DIAGNOSIS — L04.9 LYMPHADENITIS, ACUTE: Primary | ICD-10-CM

## 2022-11-14 DIAGNOSIS — M54.2 NECK PAIN ON LEFT SIDE: ICD-10-CM

## 2022-11-14 DIAGNOSIS — Z87.891 FORMER SMOKER: ICD-10-CM

## 2022-11-14 PROCEDURE — 99214 OFFICE O/P EST MOD 30 MIN: CPT | Performed by: PHYSICIAN ASSISTANT

## 2022-11-14 RX ORDER — CEPHALEXIN 500 MG/1
500 CAPSULE ORAL 3 TIMES DAILY
Qty: 30 CAPSULE | Refills: 0 | Status: SHIPPED | OUTPATIENT
Start: 2022-11-14 | End: 2022-12-01

## 2022-11-14 RX ORDER — CELECOXIB 200 MG/1
200 CAPSULE ORAL DAILY
Qty: 14 CAPSULE | Refills: 0 | Status: SHIPPED | OUTPATIENT
Start: 2022-11-14 | End: 2022-12-01

## 2022-11-14 NOTE — PROGRESS NOTES
Assessment & Plan     Lymphadenitis, acute    You have a bacterial infection of a lymph node. The lymph nodes are part of your immune system. They are found under the jaw and along the side of the neck, in the armpits groin, and some other parts of the body. An infection or inflammation in nearby tissues causes the lymph nodes to swell and become tender.   When a bacterial infection occurs in the lymph node, it becomes very painful. The nearby skin gets red and warm. You may also have a fever.   Antibiotics and warm compresses are used to treat this infection. The pain and redness will get better over the next 7 to 10 days. Swelling may take several months to completely go away.   Sometimes an abscess (with pus) forms inside the lymph node. If this happens, antibiotics may not be enough to cure the infection. Your healthcare provider may advise draining it with a needle or that minor surgery is needed to better drain the pus. You may need blood tests or a study of the pus inside the abscess to guide your treatment.   Home care  Follow these guidelines when caring for yourself at home:     Take all of the antibiotic medicine exactly as prescribed until it's gone. Be careful not to miss any doses.    Make a warm compress by running warm water over a washcloth. Apply it to the sore area until the cloth cools off. Repeat this for 20 minutes. Use the warm compress 3 times a day for the first 3 days, or until the pain and redness start to get better. The heat will increase the blood flow to the area and speed the healing process.      - cephALEXin (KEFLEX) 500 MG capsule; Take 1 capsule (500 mg) by mouth 3 times daily  - Adult ENT  Referral; Future    Neck pain on left side    Left side neck tenderness of lymph nodes  Patient has GERD so we will try celebrex to protect stomach     - celecoxib (CELEBREX) 200 MG capsule; Take 1 capsule (200 mg) by mouth daily    Former smoker    Patient is a former smoker  If  lymph nodes do not improve with treatment then patient will see ENT      Review of external notes as documented elsewhere in note       CONSULTATION/REFERRAL to ENT    No follow-ups on file.    Diaz Perdomo, [facility], PA-C  [facility] JEWEL Bui is a 77 year old, presenting for the following health issues:  Neck Pain (L side neck and ear pain X 1 week )      HPI   Review of Systems   Constitutional, HEENT, cardiovascular, pulmonary, gi and gu systems are negative, except as otherwise noted.      Objective    /70   Pulse 89   Temp 96.8  F (36  C) (Tympanic)   Resp 16   SpO2 98%   There is no height or weight on file to calculate BMI.  Physical Exam   GENERAL: healthy, alert and no distress  EYES: Eyes grossly normal to inspection, PERRL and conjunctivae and sclerae normal  HENT: ear canals and TM's normal, nose and mouth without ulcers or lesions  NECK: cervical adenopathy left side  RESP: lungs clear to auscultation - no rales, rhonchi or wheezes  CV: regular rate and rhythm, normal S1 S2, no S3 or S4, no murmur, click or rub, no peripheral edema and peripheral pulses strong  MS: no gross musculoskeletal defects noted, no edema  SKIN: no suspicious lesions or rashes  NEURO: Normal strength and tone, mentation intact and speech normal  PSYCH: mentation appears normal, affect normal/bright  LYMPH: Positive for left side submandibular lymph node tenderness        No results found for any visits on 11/14/22.

## 2022-11-16 ENCOUNTER — TELEPHONE (OUTPATIENT)
Dept: OTOLARYNGOLOGY | Facility: CLINIC | Age: 77
End: 2022-11-16

## 2022-11-16 NOTE — TELEPHONE ENCOUNTER
[facility] Call Center    Phone Message    May a detailed message be left on voicemail: yes     Reason for Call: Appointment Intake    Referring Provider Name: Diaz Perdomo PA-C in  URGENT CARE  Diagnosis and/or Symptoms: Lymphadenitis, acute [L04.9]    Please advise     Action Taken: Message routed to:  [facility] ([facility] ENT    Travel Screening: Not Applicable

## 2022-11-17 ENCOUNTER — TRANSFERRED RECORDS (OUTPATIENT)
Dept: FAMILY MEDICINE | Facility: CLINIC | Age: 77
End: 2022-11-17

## 2022-12-01 ENCOUNTER — OFFICE VISIT (OUTPATIENT)
Dept: FAMILY MEDICINE | Facility: CLINIC | Age: 77
End: 2022-12-01
Payer: COMMERCIAL

## 2022-12-01 VITALS
RESPIRATION RATE: 18 BRPM | TEMPERATURE: 98.2 F | SYSTOLIC BLOOD PRESSURE: 133 MMHG | DIASTOLIC BLOOD PRESSURE: 77 MMHG | WEIGHT: 167 LBS | HEIGHT: 71 IN | OXYGEN SATURATION: 99 % | HEART RATE: 87 BPM | BODY MASS INDEX: 23.38 KG/M2

## 2022-12-01 DIAGNOSIS — S22.088A OTHER FRACTURE OF T11-T12 VERTEBRA, INITIAL ENCOUNTER FOR CLOSED FRACTURE (H): ICD-10-CM

## 2022-12-01 DIAGNOSIS — Z13.820 SCREENING FOR OSTEOPOROSIS: Primary | ICD-10-CM

## 2022-12-01 PROCEDURE — 99214 OFFICE O/P EST MOD 30 MIN: CPT | Performed by: PHYSICIAN ASSISTANT

## 2022-12-01 NOTE — PROGRESS NOTES
Assessment & Plan     Screening for osteoporosis  Other fracture of t11-T12 vertebra, initial encounter for closed fracture (H)    DEXA scan ordered.  Provided her the number to call and schedule.  Discussed calcium and vitamin D recommendations.  Increase weightbearing exercise.  Continue physical therapy which I think is a great option for her.  We will contact her with the results and the plan going forward.  Discussed Fosamax/Prolia today.  Did discuss Fosamax with her dentist already.  Due for AWV in new year.    - DX Hip/Pelvis/Spine    30 minutes spent on the date of the encounter doing chart review, review of test results, interpretation of tests, patient visit and documentation          Return in about 2 months (around 2/1/2023) for Routine preventive, with me.    The likelihood of other entities in the differential is insufficient to justify any further testing for them at this time. This was explained to the patient. The patient was advised that persistent or worsening symptoms would require further evaluation. Patient advised to call the office and if unable to reach to go to the emergency room if they develop any new or worsening symptoms. Expressed understanding and agreement with above stated plan.     LYNNE Conklin [facility] RALPH Bui is a 77 year old presenting for the following health issues:  Osteoporosis    Presents today to discuss screening for osteoporosis.  Longstanding history of thoracic back pain.  Underwent a lung cancer screening earlier this year which revealed compression deformities of T3 and T5.  Saw a spine specialist at [facility] orthopedics a few weeks ago who rescanned her her back with an x-ray which revealed additional compression fractures at T11.  They started her with physical therapy which she is enjoying thus far.  Seeing some mild improvement.  Additionally, they recommended screening for osteoporosis and beginning  "treatment.  Last DEXA scan 2011.  Does take vitamin D daily.  No calcium supplementation.    Otherwise she is doing well.  Retired.  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  2 days a week.    Up-to-date on COVID-19 boosters as well as flu shot which she completed at the pharmacy.    History of Present Illness       Back Pain:  She presents for follow up of back pain. Patient's back pain is a recurring problem.  Location of back pain:  Right middle of back and left middle of back  Description of back pain: dull ache  Back pain spreads: nowhere    Since patient first noticed back pain, pain is: always present, but gets better and worse  Does back pain interfere with her job:  No      She eats 2-3 servings of fruits and vegetables daily.She consumes 0 sweetened beverage(s) daily.She exercises with enough effort to increase her heart rate 10 to 19 minutes per day.  She exercises with enough effort to increase her heart rate 3 or less days per week.   She is taking medications regularly.       Review of Systems   Constitutional, HEENT, cardiovascular, pulmonary, GI, , musculoskeletal, neuro, skin, endocrine and psych systems are negative, except as otherwise noted.      Objective    /77 (BP Location: Right arm, Patient Position: Chair, Cuff Size: Adult Large)   Pulse 87   Temp 98.2  F (36.8  C) (Temporal)   Resp 18   Ht 1.791 m (5' 10.5\")   Wt 75.8 kg (167 lb)   SpO2 99%   BMI 23.62 kg/m    Body mass index is 23.62 kg/m .     Allergies   Allergen Reactions     No Known Allergies      Current Outpatient Medications   Medication Sig Dispense Refill     aspirin 81 MG tablet Take by mouth daily 1 tablet      Cholecalciferol (VITAMIN D) 1000 UNITS capsule Take 1 capsule by mouth daily       Coenzyme Q10 (CO Q10) 100 MG CAPS Take by mouth daily       losartan (COZAAR) 50 MG tablet TAKE 1 TABLET(50 MG) BY MOUTH DAILY 90 tablet 2     multivitamin w/minerals (THERA-VIT-M) tablet Take 1 tablet by " mouth daily       Omega-3 Fatty Acids (FISH OIL) 1000 MG CPDR Take by mouth daily       rosuvastatin (CRESTOR) 40 MG tablet Take 1 tablet (40 mg) by mouth daily 90 tablet 2     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)      Emphysema lung (H) 1/6/2022     Family history of heart disease      Hyperlipidemia      PVD (peripheral vascular disease) (H)      Tobacco abuse      Past Surgical History:   Procedure Laterality Date     BIOPSY  3/1/2018    Mole on back     BUNIONECTOMY Right 8/26/2019    Procedure: BUNIONECTOMY RIGHT FOOT;  Surgeon: Rajni Sanchez MD;  Location: [facility] OR     COLONOSCOPY  10/18/2018    1 polyp - all clear     NO HISTORY OF SURGERY       ORTHOPEDIC SURGERY  1/1/2012    Rotator Cuff - Shoulder         Physical Exam   GENERAL: healthy, alert and no distress  EYES: Eyes grossly normal to inspection, PERRL and conjunctivae and sclerae normal  NECK: no asymmetry, masses.  RESP: lungs clear to auscultation - no rales, rhonchi or wheezes  CV: regular rate and rhythm, normal S1 S2, no S3 or S4, no murmur, click or rub, no peripheral edema and peripheral pulses strong  MS: no gross musculoskeletal defects noted, no edema  SKIN: no suspicious lesions or rashes  NEURO: Normal strength and tone, mentation intact and speech normal  PSYCH: mentation appears normal, affect normal/bright

## 2022-12-01 NOTE — PATIENT INSTRUCTIONS
[facility], [address], [address], [place], [place], [id number] [phone]

## 2022-12-19 ENCOUNTER — HOSPITAL ENCOUNTER (OUTPATIENT)
Dept: BONE DENSITY | Facility: CLINIC | Age: 77
Discharge: HOME | End: 2022-12-19
Attending: PHYSICIAN ASSISTANT | Admitting: PHYSICIAN ASSISTANT
Payer: COMMERCIAL

## 2022-12-19 DIAGNOSIS — Z13.820 SCREENING FOR OSTEOPOROSIS: ICD-10-CM

## 2022-12-19 DIAGNOSIS — S22.088A OTHER FRACTURE OF T11-T12 VERTEBRA, INITIAL ENCOUNTER FOR CLOSED FRACTURE (H): ICD-10-CM

## 2022-12-19 PROCEDURE — 77080 DXA BONE DENSITY AXIAL: CPT

## 2022-12-20 ENCOUNTER — TELEPHONE (OUTPATIENT)
Dept: FAMILY MEDICINE | Facility: CLINIC | Age: 77
End: 2022-12-20

## 2022-12-20 DIAGNOSIS — M81.0 OSTEOPOROSIS, UNSPECIFIED OSTEOPOROSIS TYPE, UNSPECIFIED PATHOLOGICAL FRACTURE PRESENCE: Primary | ICD-10-CM

## 2022-12-20 DIAGNOSIS — S22.088A OTHER FRACTURE OF T11-T12 VERTEBRA, INITIAL ENCOUNTER FOR CLOSED FRACTURE (H): ICD-10-CM

## 2022-12-20 DIAGNOSIS — K21.00 GASTROESOPHAGEAL REFLUX DISEASE WITH ESOPHAGITIS WITHOUT HEMORRHAGE: ICD-10-CM

## 2022-12-20 RX ORDER — OMEPRAZOLE 40 MG/1
40 CAPSULE, DELAYED RELEASE ORAL DAILY
Qty: 90 CAPSULE | Refills: 1 | Status: SHIPPED | OUTPATIENT
Start: 2022-12-20

## 2022-12-20 RX ORDER — ALENDRONATE SODIUM 70 MG/1
70 TABLET ORAL
Qty: 30 TABLET | Refills: 0 | Status: SHIPPED | OUTPATIENT
Start: 2022-12-20 | End: 2023-06-14

## 2022-12-20 NOTE — TELEPHONE ENCOUNTER
Spoke with her - willing to start Fosamax. Discussed proper usage and monitoring.  Wants refill of Omeprazole. Takes 40 mg. Hx of GERD. Has f/u in Jan for AWV

## 2022-12-20 NOTE — RESULT ENCOUNTER NOTE
Perlita DAN,    I just left you a voicemail - Hope you are doing well! Your bone density scan does display osteopenia, borderline to osteoporosis.  Radiologist recommended pursuing treatment.  I know we had discussed this briefly at your most recent visit.     What I would recommend is beginning Fosamax 70 mg once weekly.  We will plan to do a DEXA scan in 1 year.  We discussed side effects last time such as esophagitis and the very rare jaw necrosis.    Administer first thing in the morning and >30 minutes before the first food, beverage (except plain water), or other medication of the day. Do not take with mineral water or with other beverages. Remain upright (do not lie down) for at least 30 minutes and until after first food of the day (to reduce esophageal irritation).    Is something that you wish me to send over now or we can discuss it further at your upcoming visit on 1/3?     Happy Holidays,    LYNNE Conklin [facility]

## 2022-12-29 ENCOUNTER — TRANSFERRED RECORDS (OUTPATIENT)
Dept: HEALTH INFORMATION MANAGEMENT | Facility: CLINIC | Age: 77
End: 2022-12-29

## 2023-01-16 ASSESSMENT — ENCOUNTER SYMPTOMS
EYE PAIN: 0
NAUSEA: 0
CHILLS: 0
ABDOMINAL PAIN: 0
BREAST MASS: 0
FREQUENCY: 0
ARTHRALGIAS: 0
FEVER: 0
HEARTBURN: 0
HEADACHES: 0
DIARRHEA: 0
PARESTHESIAS: 0
MYALGIAS: 0
JOINT SWELLING: 0
HEMATURIA: 0
CONSTIPATION: 0
COUGH: 0
DIZZINESS: 0
PALPITATIONS: 0
NERVOUS/ANXIOUS: 0
DYSURIA: 0
SHORTNESS OF BREATH: 0
SORE THROAT: 0
HEMATOCHEZIA: 0
WEAKNESS: 0

## 2023-01-16 ASSESSMENT — ACTIVITIES OF DAILY LIVING (ADL): CURRENT_FUNCTION: NO ASSISTANCE NEEDED

## 2023-01-17 ENCOUNTER — OFFICE VISIT (OUTPATIENT)
Dept: FAMILY MEDICINE | Facility: CLINIC | Age: 78
End: 2023-01-17
Payer: COMMERCIAL

## 2023-01-17 VITALS
HEART RATE: 77 BPM | TEMPERATURE: 97.5 F | DIASTOLIC BLOOD PRESSURE: 78 MMHG | HEIGHT: 71 IN | RESPIRATION RATE: 16 BRPM | WEIGHT: 165 LBS | SYSTOLIC BLOOD PRESSURE: 125 MMHG | OXYGEN SATURATION: 100 % | BODY MASS INDEX: 23.1 KG/M2

## 2023-01-17 DIAGNOSIS — E78.5 HYPERLIPIDEMIA LDL GOAL <100: ICD-10-CM

## 2023-01-17 DIAGNOSIS — M81.0 OSTEOPOROSIS, UNSPECIFIED OSTEOPOROSIS TYPE, UNSPECIFIED PATHOLOGICAL FRACTURE PRESENCE: ICD-10-CM

## 2023-01-17 DIAGNOSIS — I25.750 CORONARY ARTERY DISEASE INVOLVING NATIVE ARTERY OF TRANSPLANTED HEART WITH UNSTABLE ANGINA PECTORIS (H): ICD-10-CM

## 2023-01-17 DIAGNOSIS — H61.23 BILATERAL IMPACTED CERUMEN: ICD-10-CM

## 2023-01-17 DIAGNOSIS — Z00.00 ENCOUNTER FOR MEDICARE ANNUAL WELLNESS EXAM: Primary | ICD-10-CM

## 2023-01-17 DIAGNOSIS — Z87.891 FORMER SMOKER: ICD-10-CM

## 2023-01-17 PROBLEM — Z86.018 HISTORY OF DYSPLASTIC NEVUS: Status: ACTIVE | Noted: 2017-10-17

## 2023-01-17 PROBLEM — Z85.828 HISTORY OF BASAL CELL CARCINOMA: Status: ACTIVE | Noted: 2017-10-17

## 2023-01-17 LAB
ALBUMIN SERPL BCG-MCNC: 4.5 G/DL (ref 3.5–5.2)
ALP SERPL-CCNC: 68 U/L (ref 35–104)
ALT SERPL W P-5'-P-CCNC: 16 U/L (ref 10–35)
ANION GAP SERPL CALCULATED.3IONS-SCNC: 13 MMOL/L (ref 7–15)
AST SERPL W P-5'-P-CCNC: 35 U/L (ref 10–35)
BASOPHILS # BLD AUTO: 0 10E3/UL (ref 0–0.2)
BASOPHILS NFR BLD AUTO: 0 %
BILIRUB SERPL-MCNC: 0.5 MG/DL
BUN SERPL-MCNC: 19.1 MG/DL (ref 8–23)
CALCIUM SERPL-MCNC: 9.4 MG/DL (ref 8.8–10.2)
CHLORIDE SERPL-SCNC: 105 MMOL/L (ref 98–107)
CREAT SERPL-MCNC: 0.99 MG/DL (ref 0.51–0.95)
DEPRECATED HCO3 PLAS-SCNC: 21 MMOL/L (ref 22–29)
EOSINOPHIL # BLD AUTO: 0.2 10E3/UL (ref 0–0.7)
EOSINOPHIL NFR BLD AUTO: 2 %
ERYTHROCYTE [DISTWIDTH] IN BLOOD BY AUTOMATED COUNT: 13.6 % (ref 10–15)
GFR SERPL CREATININE-BSD FRML MDRD: 59 ML/MIN/1.73M2
GLUCOSE SERPL-MCNC: 100 MG/DL (ref 70–99)
HCT VFR BLD AUTO: 46.2 % (ref 35–47)
HGB BLD-MCNC: 15 G/DL (ref 11.7–15.7)
IMM GRANULOCYTES # BLD: 0 10E3/UL
IMM GRANULOCYTES NFR BLD: 0 %
LYMPHOCYTES # BLD AUTO: 1.8 10E3/UL (ref 0.8–5.3)
LYMPHOCYTES NFR BLD AUTO: 19 %
MCH RBC QN AUTO: 30.8 PG (ref 26.5–33)
MCHC RBC AUTO-ENTMCNC: 32.5 G/DL (ref 31.5–36.5)
MCV RBC AUTO: 95 FL (ref 78–100)
MONOCYTES # BLD AUTO: 0.8 10E3/UL (ref 0–1.3)
MONOCYTES NFR BLD AUTO: 8 %
NEUTROPHILS # BLD AUTO: 6.4 10E3/UL (ref 1.6–8.3)
NEUTROPHILS NFR BLD AUTO: 70 %
PLATELET # BLD AUTO: 280 10E3/UL (ref 150–450)
POTASSIUM SERPL-SCNC: 4.6 MMOL/L (ref 3.4–5.3)
PROT SERPL-MCNC: 7.5 G/DL (ref 6.4–8.3)
RBC # BLD AUTO: 4.87 10E6/UL (ref 3.8–5.2)
SODIUM SERPL-SCNC: 139 MMOL/L (ref 136–145)
WBC # BLD AUTO: 9.2 10E3/UL (ref 4–11)

## 2023-01-17 PROCEDURE — 80053 COMPREHEN METABOLIC PANEL: CPT | Performed by: PHYSICIAN ASSISTANT

## 2023-01-17 PROCEDURE — 85025 COMPLETE CBC W/AUTO DIFF WBC: CPT | Performed by: PHYSICIAN ASSISTANT

## 2023-01-17 PROCEDURE — 36415 COLL VENOUS BLD VENIPUNCTURE: CPT | Performed by: PHYSICIAN ASSISTANT

## 2023-01-17 PROCEDURE — G0439 PPPS, SUBSEQ VISIT: HCPCS | Performed by: PHYSICIAN ASSISTANT

## 2023-01-17 PROCEDURE — 69209 REMOVE IMPACTED EAR WAX UNI: CPT | Performed by: PHYSICIAN ASSISTANT

## 2023-01-17 ASSESSMENT — ENCOUNTER SYMPTOMS
HEADACHES: 0
ABDOMINAL PAIN: 0
EYE PAIN: 0
DIZZINESS: 0
SORE THROAT: 0
HEARTBURN: 0
FREQUENCY: 0
FEVER: 0
HEMATOCHEZIA: 0
CONSTIPATION: 0
HEMATURIA: 0
MYALGIAS: 0
DIARRHEA: 0
PARESTHESIAS: 0
JOINT SWELLING: 0
NERVOUS/ANXIOUS: 0
DYSURIA: 0
NAUSEA: 0
ARTHRALGIAS: 0
WEAKNESS: 0
BREAST MASS: 0
COUGH: 0
SHORTNESS OF BREATH: 0
CHILLS: 0
PALPITATIONS: 0

## 2023-01-17 ASSESSMENT — PAIN SCALES - GENERAL: PAINLEVEL: NO PAIN (0)

## 2023-01-17 ASSESSMENT — ACTIVITIES OF DAILY LIVING (ADL): CURRENT_FUNCTION: NO ASSISTANCE NEEDED

## 2023-01-17 NOTE — PATIENT INSTRUCTIONS
Flu and COVID shot dates    Lung CT Screening:     [facility], [address], [address], [place], [place], [address] [phone]

## 2023-01-17 NOTE — RESULT ENCOUNTER NOTE
Perlita DAN,     It was great seeing you for your Annual Wellness Visit.     -electrolytes look great.    -blood counts stable. No anemia    -Kidney function is more or less stable. Make sure you are hydrating and avoiding Advil/Aleve etc the best you can. Stick to Tylenol if needed for pain.     Appears they didn't draw your cholesterol today. I'll try and add it on. Either way, good reason to follow-up with your vascular team.     I will keep you posted on your lung screening when it returns.     Let me know if you have any questions or concerns,     Tejinder Gonsalez PA-C  [facility]

## 2023-01-17 NOTE — PROGRESS NOTES
"SUBJECTIVE:   Ramya is a 77 year old who presents for Preventive Visit.    Doing well overall.     Finishing PT for her back which has been helping. Plans to go back to work at the start of Feb. No set F/u with ortho at this time.     Active with going to the [facility] and doing the bike/treadmill without issues.     Due for vascular follow-up   Due colonoscopy in the fall. Hx of polyps. [facility]  Due for lung CT screening follow-up    Up-to-date on flu shot and COVID booster shots. Will send [employer] message with dates of shot to update.       Patient has been advised of split billing requirements and indicates understanding: Yes  Are you in the first 12 months of your Medicare coverage?  No    Healthy Habits:     In general, how would you rate your overall health?  Good    Frequency of exercise:  4-5 days/week    Duration of exercise:  30-45 minutes    Do you usually eat at least 4 servings of fruit and vegetables a day, include whole grains    & fiber and avoid regularly eating high fat or \"junk\" foods?  Yes    Taking medications regularly:  Yes    Ability to successfully perform activities of daily living:  No assistance needed    Home Safety:  No safety concerns identified    Hearing Impairment:  No hearing concerns    In the past 6 months, have you been bothered by leaking of urine?  No    In general, how would you rate your overall mental or emotional health?  Good      PHQ-2 Total Score: 0    Additional concerns today:  No      Have you ever done Advance Care Planning? (For example, a Health Directive, POLST, or a discussion with a medical provider or your loved ones about your wishes): No, advance care planning information given to patient to review.  Patient plans to discuss their wishes with loved ones or provider.        Fall risk  Fallen 2 or more times in the past year?: No  Any fall with injury in the past year?: No    Cognitive Screening   1) Repeat 3 items (Leader, Season, Table)    2) Clock draw: NORMAL  3) 3 " item recall: }Recalls 3 objects  Results: 3 items recalled: COGNITIVE IMPAIRMENT LESS LIKELY    Mini-CogTM Copyright S Fernando. Licensed by the author for use in [facility]; reprinted with permission (cameron@.[email or web address]). All rights reserved.      Do you have sleep apnea, excessive snoring or daytime drowsiness?: no    Reviewed and updated as needed this visit by clinical staff   Tobacco  Allergies  Meds   Med Hx  Surg Hx           Reviewed and updated as needed this visit by Provider   Tobacco   Meds   Med Hx  Surg Hx          Social History     Tobacco Use     Smoking status: Former     Packs/day: 0.00     Years: 30.00     Pack years: 0.00     Types: Cigarettes     Start date: 1990     Quit date: 2018     Years since quittin.4     Smokeless tobacco: Never     Tobacco comments:      2-3 cigs per day off and on   Substance Use Topics     Alcohol use: Yes     Alcohol/week: 0.0 standard drinks     Comment: Very minimal     Current providers sharing in care for this patient include:  Patient Care Team:  Tejinder Gonsalez PA-C as PCP - General (Physician Assistant - Medical)  Dominik Wolf MD as Assigned Heart and Vascular Provider  Abena Maddox DO as Assigned PCP    The following health maintenance items are reviewed in Epic and correct as of today:  Health Maintenance   Topic Date Due     SPIROMETRY  Never done     COVID-19 Vaccine (4 - Booster for Pfizer series) 2022     INFLUENZA VACCINE (1) 2022     LUNG CANCER SCREENING  2023     COLORECTAL CANCER SCREENING  10/15/2023     MAMMO SCREENING  2023     MEDICARE ANNUAL WELLNESS VISIT  2024     ANNUAL REVIEW OF HM ORDERS  2024     FALL RISK ASSESSMENT  2024     LIPID  12/10/2024     DTAP/TDAP/TD IMMUNIZATION (4 - Td or Tdap) 2027     ADVANCE CARE PLANNING  2028     DEXA  2037     HEPATITIS C SCREENING  Completed     PHQ-2 (once per calendar year)  Completed      Pneumococcal Vaccine: 65+ Years  Completed     ZOSTER IMMUNIZATION  Completed     COPD ACTION PLAN  Addressed     IPV IMMUNIZATION  Aged Out     MENINGITIS IMMUNIZATION  Aged Out     Lab work is in process  Labs reviewed in EPIC  BP Readings from Last 3 Encounters:   23 125/78   22 133/77   22 134/70    Wt Readings from Last 3 Encounters:   23 74.8 kg (165 lb)   22 75.8 kg (167 lb)   22 74.8 kg (165 lb)              Patient Active Problem List   Diagnosis     PVD (peripheral vascular disease) (H)     Chest pain     Hyperlipidemia LDL goal <100     Painful tongue     Chronic ischemic heart disease     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)     PAD (peripheral artery disease) (H)     Claudication (H)     Gastroesophageal reflux disease with esophagitis without hemorrhage     Former smoker     Skin lesions, generalized     Emphysema lung (H)     Tobacco use disorder     Rotator cuff syndrome     History of dysplastic nevus     History of basal cell carcinoma     Past Surgical History:   Procedure Laterality Date     BIOPSY  3/1/2018    Mole on back     BUNIONECTOMY Right 2019    Procedure: BUNIONECTOMY RIGHT FOOT;  Surgeon: Rajni Sanchez MD;  Location: [facility] OR     COLONOSCOPY  10/18/2018    1 polyp - all clear     NO HISTORY OF SURGERY       ORTHOPEDIC SURGERY  2012    Rotator Cuff - Shoulder       Social History     Tobacco Use     Smoking status: Former     Packs/day: 0.00     Years: 30.00     Pack years: 0.00     Types: Cigarettes     Start date: 1990     Quit date: 2018     Years since quittin.4     Smokeless tobacco: Never     Tobacco comments:      2-3 cigs per day off and on   Substance Use Topics     Alcohol use: Yes     Alcohol/week: 0.0 standard drinks     Comment: Very minimal     Family History   Problem Relation Age of Onset     Cerebrovascular Disease Mother      Coronary Artery  Disease Mother              Osteoporosis Mother      Hyperlipidemia Mother      Coronary Artery Disease Father 81             Hyperlipidemia Father      Coronary Artery Disease Brother              Hyperlipidemia Brother      Coronary Artery Disease Sister 52             Hyperlipidemia Sister      Coronary Artery Disease Brother 62     Hyperlipidemia Brother      Coronary Artery Disease Sister      Hyperlipidemia Sister      Coronary Artery Disease Sister      Hyperlipidemia Sister      Breast Cancer Sister 80             Breast Cancer Niece      Coronary Artery Disease Sister      Breast Cancer Sister      Coronary Artery Disease Sister          Current Outpatient Medications   Medication Sig Dispense Refill     alendronate (FOSAMAX) 70 MG tablet Take 1 tablet (70 mg) by mouth every 7 days 30 tablet 0     aspirin 81 MG tablet Take by mouth daily 1 tablet      Cholecalciferol (VITAMIN D) 1000 UNITS capsule Take 1 capsule by mouth daily       Coenzyme Q10 (CO Q10) 100 MG CAPS Take by mouth daily       losartan (COZAAR) 50 MG tablet TAKE 1 TABLET(50 MG) BY MOUTH DAILY 90 tablet 2     multivitamin w/minerals (THERA-VIT-M) tablet Take 1 tablet by mouth daily       Omega-3 Fatty Acids (FISH OIL) 1000 MG CPDR Take by mouth daily       omeprazole (PRILOSEC) 40 MG DR capsule Take 1 capsule (40 mg) by mouth daily 90 capsule 1     rosuvastatin (CRESTOR) 40 MG tablet Take 1 tablet (40 mg) by mouth daily 90 tablet 2     Allergies   Allergen Reactions     No Known Allergies      Recent Labs   Lab Test 22  1015 04/10/21  1244 21  0800 20  0843 20  0705 12/10/19  1020 19  1019 18  0901 17  1606   A1C 5.7*  --  5.7* 5.9*   < >  --   --   --   --    LDL  --   --   --   --   --  85  --  68 64   HDL  --   --   --   --   --  64  --  55 58   TRIG  --   --   --   --   --  94  --  169* 121   ALT  --  32  --  29  --  23  --  22 23   CR  --  0.82  --  1.06*    "< > 0.76   < > 0.78 0.81   GFRESTIMATED  --  70  --  52*   < > 77   < > 73 70   GFRESTBLACK  --  82  --  60*   < > 90   < > 88 84   POTASSIUM  --  4.9  --  4.3   < > 3.9   < > 4.0 4.6   TSH 1.86  --   --  3.33  --  2.14  --  2.53 1.72    < > = values in this interval not displayed.        Review of Systems   Constitutional: Negative for chills and fever.   HENT: Negative for congestion, ear pain, hearing loss and sore throat.    Eyes: Negative for pain and visual disturbance.   Respiratory: Negative for cough and shortness of breath.    Cardiovascular: Negative for chest pain, palpitations and peripheral edema.   Gastrointestinal: Negative for abdominal pain, constipation, diarrhea, heartburn, hematochezia and nausea.   Breasts:  Negative for tenderness, breast mass and discharge.   Genitourinary: Negative for dysuria, frequency, genital sores, hematuria, pelvic pain, urgency, vaginal bleeding and vaginal discharge.   Musculoskeletal: Negative for arthralgias, joint swelling and myalgias.   Skin: Negative for rash.   Neurological: Negative for dizziness, weakness, headaches and paresthesias.   Psychiatric/Behavioral: Negative for mood changes. The patient is not nervous/anxious.        OBJECTIVE:   /78   Pulse 77   Temp 97.5  F (36.4  C) (Oral)   Resp 16   Ht 1.791 m (5' 10.5\")   Wt 74.8 kg (165 lb)   SpO2 100%   BMI 23.34 kg/m   Estimated body mass index is 23.34 kg/m  as calculated from the following:    Height as of this encounter: 1.791 m (5' 10.5\").    Weight as of this encounter: 74.8 kg (165 lb).  Physical Exam  GENERAL: healthy, alert and no distress  EYES: Eyes grossly normal to inspection, PERRL and conjunctivae and sclerae normal  HENT: bilateral cerumen impaction. nose and mouth without ulcers or lesions  NECK: no adenopathy, no asymmetry, masses, or scars and thyroid normal to palpation  RESP: lungs clear to auscultation - no rales, rhonchi or wheezes  CV: regular rate and rhythm, normal S1 " S2, no S3 or S4, no murmur, click or rub, no peripheral edema and peripheral pulses strong  ABDOMEN: soft, nontender, no hepatosplenomegaly, no masses and bowel sounds normal  MS: no gross musculoskeletal defects noted, no edema  SKIN: no suspicious lesions or rashes  NEURO: Normal strength and tone, mentation intact and speech normal  PSYCH: mentation appears normal, affect normal/bright      ASSESSMENT / PLAN:   Ramya was seen today for physical.    Diagnoses and all orders for this visit:    Encounter for Medicare annual wellness exam    AWV in 1 year. Follow-up sooner if needed. Update flu/COVID shots. Will perform ear lavage today for cerumen impaction bilaterally without signs of infection     -     REVIEW OF HEALTH MAINTENANCE PROTOCOL ORDERS  -     CBC with platelets and differential; Future  -     Comprehensive metabolic panel (BMP + Alb, Alk Phos, ALT, AST, Total. 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 Bili, TP); Future    Repeat lipids today. Ordered already by vascular. Due for f/u appointment.     Osteoporosis, unspecified osteoporosis type, unspecified pathological fracture presence     Tolerating Fosamax. Labs today.      -     CBC with platelets and differential; Future  -     Comprehensive metabolic panel (BMP + Alb, Alk Phos, ALT, AST, Total. Bili, TP); Future    Former smoker  Emphysema lung (H)    CT lung screening ordered. Wants me to call with results to review.     -     CT Chest Lung Cancer Scrn Low Dose wo; Future  -     CBC with platelets and differential; Future  -     Comprehensive metabolic panel (BMP + Alb, Alk Phos, ALT, AST, Total. Bili, TP); Future      Patient has been advised of split billing requirements and indicates understanding:  Yes      COUNSELING:  Reviewed preventive health counseling, as reflected in patient instructions       Regular exercise       Healthy diet/nutrition       Vision screening       Dental care       Osteoporosis prevention/bone health       Consider lung cancer screening for ages 55-80 years (77 for Medicare) and 20 pack-year smoking history    She reports that she quit smoking about 4 years ago. Her smoking use included cigarettes. She started smoking about 33 years ago. She has never used smokeless tobacco.      Appropriate preventive services were discussed with this patient, including applicable screening as appropriate for cardiovascular disease, diabetes, osteopenia/osteoporosis, and glaucoma.  As appropriate for age/gender, discussed screening for colorectal cancer, prostate cancer, breast cancer, and cervical cancer. Checklist reviewing preventive services available has been given to the patient.    Reviewed patients plan of care and provided an AVS. The Basic Care Plan (routine screening as documented in Health Maintenance) for Ramya meets the Care Plan requirement. This Care Plan has been established and reviewed with the Patient.          The likelihood of other entities in the differential is insufficient to justify any further testing for them at this time. This was explained to the patient. The patient was advised that persistent or worsening symptoms would require further evaluation. Patient advised to call the office and if unable to reach to go to the emergency room if they develop any new or worsening symptoms. Expressed understanding and agreement with above stated plan.     Tejinder Gonsalez PA-C  [facility]

## 2023-01-17 NOTE — NURSING NOTE
Patient identified using two patient identifiers.  Ear exam showing wax occlusion completed by provider.  Solution: warm water was placed in the bilateral ear(s) via irrigation tool: elephant ear.    Padmaja Beverly MA on 1/17/2023 at 11:15 AM

## 2023-01-26 ENCOUNTER — HOSPITAL ENCOUNTER (OUTPATIENT)
Dept: CT IMAGING | Facility: CLINIC | Age: 78
Discharge: HOME | End: 2023-01-26
Attending: PHYSICIAN ASSISTANT | Admitting: PHYSICIAN ASSISTANT
Payer: COMMERCIAL

## 2023-01-26 DIAGNOSIS — Z87.891 FORMER SMOKER: ICD-10-CM

## 2023-01-26 PROCEDURE — 71271 CT THORAX LUNG CANCER SCR C-: CPT

## 2023-01-30 NOTE — RESULT ENCOUNTER NOTE
Perlita Bui,     I am happy to report no new or enlarging pulmonary nodules on your CT scan. Repeat in 1 year.     Let me know if you have any questions or concerns,     Tejinder Gonsalez PA-C  [facility]

## 2023-04-04 DIAGNOSIS — I73.9 CLAUDICATION (H): ICD-10-CM

## 2023-04-04 DIAGNOSIS — E78.5 HYPERLIPIDEMIA LDL GOAL <70: ICD-10-CM

## 2023-04-04 RX ORDER — ROSUVASTATIN CALCIUM 40 MG/1
TABLET, COATED ORAL
Qty: 90 TABLET | Refills: 0 | Status: SHIPPED | OUTPATIENT
Start: 2023-04-04 | End: 2023-07-03

## 2023-04-04 NOTE — TELEPHONE ENCOUNTER
"rosuvastatin (CRESTOR) 40 MG tablet  Last Written Prescription Date:  4/21/22  Last Fill Quantity: 90,  # refills: 2     Last office visit: 5/25/2022;  Follow up scheduled 4/25/23  Requested Prescriptions   Signed Prescriptions Disp Refills    rosuvastatin (CRESTOR) 40 MG tablet 90 tablet 0     Sig: TAKE 1 TABLET(40 MG) BY MOUTH DAILY       [facility] Protocol Passed - 4/4/2023  9:54 AM        Passed - LDL on file in past 12 months     Recent Labs   Lab Test 12/10/19  1020   LDL 85             Passed - No abnormal creatine kinase in past 12 months     No lab results found.             Passed - Recent (12 mo) or future (30 days) visit within the authorizing provider's specialty     Patient has had an office visit with the authorizing provider or a provider within the authorizing providers department within the previous 12 mos or has a future within next 30 days. See \"Patient Info\" tab in inbasket, or \"Choose Columns\" in Meds & Orders section of the refill encounter.              Passed - Medication is active on med list        Passed - Patient is age 18 or older        Passed - No active pregnancy on record        Passed - No positive pregnancy test in past 12 months           Prescription approved per [facility] Refill Protocol.              "

## 2023-04-18 ENCOUNTER — LAB (OUTPATIENT)
Dept: LAB | Facility: CLINIC | Age: 78
End: 2023-04-18
Payer: COMMERCIAL

## 2023-04-18 ENCOUNTER — HOSPITAL ENCOUNTER (OUTPATIENT)
Dept: ULTRASOUND IMAGING | Facility: CLINIC | Age: 78
Discharge: HOME | End: 2023-04-18
Attending: INTERNAL MEDICINE | Admitting: INTERNAL MEDICINE
Payer: COMMERCIAL

## 2023-04-18 DIAGNOSIS — I73.9 CLAUDICATION (H): ICD-10-CM

## 2023-04-18 DIAGNOSIS — E78.5 HYPERLIPIDEMIA LDL GOAL <70: ICD-10-CM

## 2023-04-18 DIAGNOSIS — R73.01 IFG (IMPAIRED FASTING GLUCOSE): ICD-10-CM

## 2023-04-18 LAB
CHOLEST SERPL-MCNC: 130 MG/DL
HBA1C MFR BLD: 5.9 % (ref 0–5.6)
HDLC SERPL-MCNC: 67 MG/DL
LDLC SERPL CALC-MCNC: 48 MG/DL
NONHDLC SERPL-MCNC: 63 MG/DL
TRIGL SERPL-MCNC: 77 MG/DL

## 2023-04-18 PROCEDURE — 93924 LWR XTR VASC STDY BILAT: CPT

## 2023-04-18 PROCEDURE — 80061 LIPID PANEL: CPT

## 2023-04-18 PROCEDURE — 36415 COLL VENOUS BLD VENIPUNCTURE: CPT

## 2023-04-18 PROCEDURE — 83036 HEMOGLOBIN GLYCOSYLATED A1C: CPT

## 2023-04-25 ENCOUNTER — OFFICE VISIT (OUTPATIENT)
Dept: OTHER | Facility: CLINIC | Age: 78
End: 2023-04-25
Attending: INTERNAL MEDICINE
Payer: COMMERCIAL

## 2023-04-25 VITALS
WEIGHT: 166 LBS | HEIGHT: 70 IN | DIASTOLIC BLOOD PRESSURE: 83 MMHG | OXYGEN SATURATION: 99 % | BODY MASS INDEX: 23.77 KG/M2 | HEART RATE: 80 BPM | SYSTOLIC BLOOD PRESSURE: 165 MMHG

## 2023-04-25 DIAGNOSIS — K21.9 GASTROESOPHAGEAL REFLUX DISEASE WITHOUT ESOPHAGITIS: ICD-10-CM

## 2023-04-25 DIAGNOSIS — R73.01 IFG (IMPAIRED FASTING GLUCOSE): ICD-10-CM

## 2023-04-25 DIAGNOSIS — I73.9 CLAUDICATION (H): ICD-10-CM

## 2023-04-25 DIAGNOSIS — E78.5 HYPERLIPIDEMIA LDL GOAL <70: ICD-10-CM

## 2023-04-25 DIAGNOSIS — I10 ESSENTIAL HYPERTENSION: ICD-10-CM

## 2023-04-25 PROCEDURE — 99215 OFFICE O/P EST HI 40 MIN: CPT | Performed by: INTERNAL MEDICINE

## 2023-04-25 PROCEDURE — G0463 HOSPITAL OUTPT CLINIC VISIT: HCPCS

## 2023-04-25 NOTE — PROGRESS NOTES
Ramya Hillman is a 78 year old female who is presenting at the current time to discuss her diagnosi(es) of         Claudication (H)  Hyperlipidemia LDL goal <70  IFG (impaired fasting glucose)  Essential hypertension  Gastroesophageal reflux disease without esophagitis           HPI: Ramya Hillman is a 78 year old female who was seen today regarding bilateral lower leg claudication.  She is a former smoker and who quit smoking all together in 8/2019 after her appointment with Dr. Salinas, who has since moved. When seen on 9/17/2020, the patient could walk about 4 blocks/15 minutes before she would get cramping calf pain that resolves with short rest. That distance was very repeatable. She had improved since 2021 when she could only walk for 2 blocks and four minutes.  She denied chest pain and no stroke or TIA symptoms. Currently, she does not feel her lifestyle is severely limited by her symptoms, and she is now walking regularly. She has no CLI sxs or tissue loss.      On a statin, her LDL <70, LDL-P<1000, Lp(a) moderately elevated at 45. BP today is 165/83. Her BP at home runs 128/78. Her resting ZOILA is  0.56 (post exercise 0.62, previously 0.25)on the right, previously 0.58 with monophasic waveforms;Her left ZOILA is 0.98, previously 0.90 (post exercise 0.80, previously 0.72), with triphasic waveforms. She can currently walk 6 blocks before she develops pain in the RLE. She will stop to rest for 1-2 minutes and her sxs resolve to the point that she can walk again without limitation.This does not appreciably impact her ADLs. She has no nocturnal ischemic rest pain or nonhealing ulcers.     When seen in 4/2022, her BP was elevated. Med changes were not made at that time. She was advised to RTC with a month's worth of BP readings from home. In 05/22, it was noted her SBP averaged 119 at home. Now her SBP averages 128.        Review Of Systems  Skin: negative  Eyes: negative  Ears/Nose/Throat: negative sensation of  right leg discomfort  Respiratory: No shortness of breath, dyspnea on exertion, cough, or hemoptysis  Cardiovascular: sensation of right leg discomfort  Gastrointestinal: negative  Genitourinary: negative  Musculoskeletal: as above  Neurologic: negative  Psychiatric: negative  Hematologic/Lymphatic/Immunologic: negative  Endocrine: negative     PAST MEDICAL HISTORY:                  Past Medical History        Past Medical History:   Diagnosis Date     Arthritis 1/01/2000     Hands & feet     Chest pain       Chronic ischemic heart disease       Condyloma       had for5 years, 20 years ago,recurrence 2012 biopsy taken     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)       Emphysema lung (H) 1/6/2022     Family history of heart disease       Hyperlipidemia       PVD (peripheral vascular disease) (H)       Tobacco abuse              PAST SURGICAL HISTORY:                  Past Surgical History         Past Surgical History:   Procedure Laterality Date     BIOPSY   3/1/2018     Mole on back     BUNIONECTOMY Right 8/26/2019     Procedure: BUNIONECTOMY RIGHT FOOT;  Surgeon: Rajni Sanchez MD;  Location: [facility] OR     COLONOSCOPY   10/18/2018     1 polyp - all clear     NO HISTORY OF SURGERY         ORTHOPEDIC SURGERY   1/1/2012     Rotator Cuff - Shoulder            CURRENT MEDICATIONS:                  Current Outpatient Prescriptions          Current Outpatient Medications   Medication Sig Dispense Refill     aspirin 81 MG tablet Take by mouth daily 1 tablet       Cholecalciferol (VITAMIN D) 1000 UNITS capsule Take 1 capsule by mouth daily         Coenzyme Q10 (CO Q10) 100 MG CAPS Take by mouth daily         losartan (COZAAR) 50 MG tablet Take 1 tablet (50 mg) by mouth daily 30 tablet 3     multivitamin w/minerals (THERA-VIT-M) tablet Take 1 tablet by mouth daily         Omega-3 Fatty Acids (FISH OIL) 1000 MG CPDR Take by mouth daily         rosuvastatin  (CRESTOR) 40 MG tablet Take 1 tablet (40 mg) by mouth daily 90 tablet 2            ALLERGIES:                       Allergies   Allergen Reactions     No Known Allergies           SOCIAL HISTORY:                  Social History   Social History            Socioeconomic History     Marital status:        Spouse name: Not on file     Number of children: 0     Years of education: Not on file     Highest education level: Not on file   Occupational History     Occupation: Sales   Tobacco Use     Smoking status: Former Smoker       Packs/day: 0.00       Years: 30.00       Pack years: 0.00       Types: Cigarettes       Start date: 1/1/1990       Quit date: 8/1/2018       Years since quitting: 3.8     Smokeless tobacco: Never Used     Tobacco comment:  2-3 cigs per day off and on   Substance and Sexual Activity     Alcohol use: Yes       Alcohol/week: 0.0 standard drinks       Comment: Very minimal     Drug use: No     Sexual activity: Not Currently       Birth control/protection: Post-menopausal       Comment: postmenopausal   Other Topics Concern      Service Not Asked     Blood Transfusions Not Asked     Caffeine Concern No       Comment: 1 a day. mostly decaf     Occupational Exposure Not Asked     Hobby Hazards Not Asked     Sleep Concern Not Asked     Stress Concern Not Asked     Weight Concern Not Asked     Special Diet Not Asked     Back Care Not Asked     Exercise No       Comment: likes to walk     Bike Helmet Not Asked     Seat Belt Not Asked     Self-Exams Not Asked     Parent/sibling w/ CABG, MI or angioplasty before 65F 55M? Yes   Social History Narrative     Not on file      Social Determinants of Health      Financial Resource Strain: Not on file   Food Insecurity: Not on file   Transportation Needs: Not on file   Physical Activity: Not on file   Stress: Not on file   Social Connections: Not on file   Intimate Partner Violence: Not on file   Housing Stability: Not on file            FAMILY  HISTORY:                   Family History         Family History   Problem Relation Age of Onset     Cerebrovascular Disease Mother       Coronary Artery Disease Mother                Osteoporosis Mother       Hyperlipidemia Mother       Coronary Artery Disease Father 81              Hyperlipidemia Father       Coronary Artery Disease Brother                Hyperlipidemia Brother       Coronary Artery Disease Sister 52              Hyperlipidemia Sister       Coronary Artery Disease Brother 62     Hyperlipidemia Brother       Coronary Artery Disease Sister       Hyperlipidemia Sister       Coronary Artery Disease Sister       Hyperlipidemia Sister       Breast Cancer Sister 80              Breast Cancer Niece       Coronary Artery Disease Sister       Breast Cancer Sister       Coronary Artery Disease Sister                 Component      Latest Ref Rng 2023  9:10 AM   Cholesterol      <200 mg/dL 130    Triglycerides      <150 mg/dL 77    HDL Cholesterol      >=50 mg/dL 67    LDL Cholesterol Calculated      <=100 mg/dL 48    Non HDL Cholesterol      <130 mg/dL 63    Hemoglobin A1C      0.0 - 5.6 % 5.9 (H)       Legend:  (H) High     ULTRASOUND ZOILA DOPPLER WITH EXERCISE BILATERAL 2023 10:23 AM        HISTORY: Claudication (H).     COMPARISON: 2019.     FINDINGS:  Right ZOILA:   PT: 105, index of 0.56, previously 0.66.  DP: 104, index of 0.56, previously 0.64.     Left ZOILA:   PT: 148, index of 0.8, previously 0.98.  DP: 154, index of 0.83, previously 0.9.      Waveforms: Distal waveforms monophasic in the right posterior tibial  artery and biphasic in the right dorsalis pedis artery. The left  posterior tibial and dorsalis pedis arteries are triphasic/biphasic.     Exercise: The patient was exercised on a treadmill at 1.0 MPH at a 10%  incline for 4 minutes total. Mild fatigue noted in the right leg at 3  minutes..     Right exercise ZOILA: 0.62, previously  0.25.  Left exercise ZOILA: 0.8, previously 0.72.                                                                      IMPRESSION:   1. Moderate arterial insufficiency in the right lower extremity,  resting ZOILA slightly diminished from previous exam, though  postexercise value improved from previous exam.  2. Mild arterial insufficiency in the left lower extremity, relatively  comparable.     ZOILA CRITERIA:  >1.4 NC  0.95-1.4 Normal  0.90 - 0.94 Mild  0.5 - 0.89 Moderate  0.2 - 0.49 Severe  <0.2 Critical     ERNESTINA ELI MD      A/P:           (I73.9) Claudication (H)  Comment: not lifestyle limiting. Advised continued walking. Do not clip toenails agressively. She does not want to be on more meds and as such Pletal was not offered to her. Obtain below in one year. Present to clinic sooner if claudication worsen, or CLI sxs develop.  Plan: US ZOILA Doppler with Exercise Bilateral            (E78.5) Hyperlipidemia LDL goal <70  Comment: At LDL goal  on a statin  Plan: check lipid panel in one year. Place on pelacarsen when it becomes avialable on the market            (R73.01) IFG (impaired fasting glucose)  Comment: avoid CHO, at goal  Plan: Hemoglobin A1c in one year            (I10) Essential hypertension  Comment:at goal at home, SBP averages 128  Plan: no BP med changes, RTC one year    45 minutes total medical care on today's date.

## 2023-04-25 NOTE — PROGRESS NOTES
Patient is here to discuss follow up    There were no vitals taken for this visit.    Questions patient would like addressed today are: N/A.    Refills are needed: Yes:  Losartan Potassium & Crestor    Has homecare services and agency name:  [facility]

## 2023-05-22 DIAGNOSIS — I10 ESSENTIAL HYPERTENSION: ICD-10-CM

## 2023-05-22 RX ORDER — LOSARTAN POTASSIUM 50 MG/1
TABLET ORAL
Qty: 90 TABLET | Refills: 0 | Status: SHIPPED | OUTPATIENT
Start: 2023-05-22 | End: 2023-08-22

## 2023-05-22 NOTE — TELEPHONE ENCOUNTER
losartan (COZAAR) 50 MG tablet  Last Written Prescription Date:  8//24/22  Last Fill Quantity: 90,  # refills: 2    Last office visit:  4/25/23  Follow up recommended:  One year    Unable to fill per [facility] protocol.  Medication and pharmacy loaded.     Angiotensin-II Receptors Failed 05/22/2023 09:40 AM   Protocol Details  Last blood pressure under 140/90 in past 12 months    Normal serum creatinine on file in past 12 months            Routing to covering provider for review/signature.

## 2023-06-13 NOTE — PROGRESS NOTES
Assessment & Plan         Shortness of breath  Other fatigue    Expressed my concern to her about her recent symptoms.  She appears well today in the office.  No acute distress.  EKG is unremarkable and unchanged compared to prior in 2021.  Normal sinus rhythm with no acute ST or T wave changes.  Reassured by negative Lexiscan in 2021.  Check CBC, CMP, and TSH today.  Wonder if this is related to underlying more or less undertreated emphysema, we will perform PFTs.  Option to start her on some type of maintenance inhaler to help with her breathing.  Additionally, not unreasonable to pursue Lexiscan stress test once again to reassess given significant family history for heart disease.  Reviewed signs and symptoms at length that warrant urgent/emergent re-evaluation.  Comfortable with plan.  She will keep me posted how she is feeling.    - EKG 12-lead complete w/read - Clinics  - CBC with platelets and differential  - Comprehensive metabolic panel (BMP + Alb, Alk Phos, ALT, AST, Total. Bili, TP)  - TSH with free T4 reflex  - General PFT Lab (Please always keep checked)  - NM Lexiscan stress test    Pulmonary emphysema, unspecified emphysema type (H)  - General PFT Lab (Please always keep checked)    Osteoporosis, unspecified osteoporosis type, unspecified pathological fracture presence  Other fracture of t11-T12 vertebra, initial encounter for closed fracture (H)  Fosamax refilled.  Did express interest in potentially pursuing Prolia.  She will keep me posted.  - alendronate (FOSAMAX) 70 MG tablet  Dispense: 30 tablet; Refill: 1      30 minutes spent by me on the date of the encounter doing chart review, review of test results, interpretation of tests, patient visit and documentation          Return in about 2 months (around 8/14/2023).    The likelihood of other entities in the differential is insufficient to justify any further testing for them at this time. This was explained to the patient. The patient was advised  that persistent or worsening symptoms would require further evaluation. Patient advised to call the office and if unable to reach to go to the emergency room if they develop any new or worsening symptoms. Expressed understanding and agreement with above stated plan.     LYNNE Conklin [facility]   Ramya Hillman is a 78 year old female presenting for the following health issues:  Patient presents with:  Fatigue  Shortness of Breath    Here for evaluation of shortness of breath as well as fatigue.  Patient notes over the past approximately 6 months has noted episodes of shortness of breath especially when walking.  Will become winded after approximately 10 minutes and need to sit down and rest.  Additionally, notes episodes when doing something is simply being in the shower will cause her to have episodes of shortness of breath.  Never occurs at rest.    Has fatigue.  Typically sleeps 7-8 hours night.  Wakes up feeling rested however will often have to take naps in the afternoon.    Denies headache, dizziness, vision changes, syncope, presyncope, chest pain, palpitations, abdominal pain, or new leg swelling.      Has ongoing care with vascular team for history of PAD.  Recen lung CT displayed moderate coronary artery calcification for which she is on statin therapy.  Blood pressure well controlled, monitors regularly at home.  History of emphysema.  No prior evaluation with PFTs.  Recently had nuclear medicine stress test in 2021 which was negative for ischemic changes.    Recently retired.    Requesting refill on her Fosamax, hoping for large amount of pills so she does not have to go to the pharmacy as often.      Review of Systems   Constitutional, HEENT, cardiovascular, pulmonary, GI, , musculoskeletal, neuro, skin, endocrine and psych systems are negative, except as otherwise noted.      Objective    /75   Pulse 86   Temp 97.6  F (36.4  C) (Tympanic)    "Resp 15   Ht 1.778 m (5' 10\")   Wt 74.6 kg (164 lb 6.4 oz)   SpO2 96%   BMI 23.59 kg/m    5' 10\"  164 lbs 6.4 oz    Allergies   Allergen Reactions     No Known Allergies      Current Outpatient Medications   Medication Sig Dispense Refill     alendronate (FOSAMAX) 70 MG tablet Take 1 tablet (70 mg) by mouth every 7 days 30 tablet 1     aspirin 81 MG tablet Take by mouth daily 1 tablet      Cholecalciferol (VITAMIN D) 1000 UNITS capsule Take 1 capsule by mouth daily       Coenzyme Q10 (CO Q10) 100 MG CAPS Take by mouth daily       losartan (COZAAR) 50 MG tablet TAKE 1 TABLET(50 MG) BY MOUTH DAILY 90 tablet 0     multivitamin w/minerals (THERA-VIT-M) tablet Take 1 tablet by mouth daily       Omega-3 Fatty Acids (FISH OIL) 1000 MG CPDR Take by mouth daily       omeprazole (PRILOSEC) 40 MG DR capsule Take 1 capsule (40 mg) by mouth daily 90 capsule 1     rosuvastatin (CRESTOR) 40 MG tablet TAKE 1 TABLET(40 MG) BY MOUTH DAILY 90 tablet 0     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)      Emphysema lung (H) 1/6/2022     Family history of heart disease      History of basal cell carcinoma 10/17/2017     Hyperlipidemia      PVD (peripheral vascular disease) (H)      Tobacco abuse      Past Surgical History:   Procedure Laterality Date     BIOPSY  3/1/2018    Mole on back     BUNIONECTOMY Right 8/26/2019    Procedure: BUNIONECTOMY RIGHT FOOT;  Surgeon: Rajni Sanchez MD;  Location: [facility] OR     COLONOSCOPY  10/18/2018    1 polyp - all clear     NO HISTORY OF SURGERY       ORTHOPEDIC SURGERY  1/1/2012    Rotator Cuff - Shoulder       Physical Exam   GENERAL: healthy, alert and no distress  EYES: Eyes grossly normal to inspection, PERRL and " conjunctivae and sclerae normal  HENT: ear canals and TM's normal, nose and mouth without ulcers or lesions  NECK: no adenopathy, no asymmetry, masses, or scars and thyroid normal to palpation  RESP: lungs clear to auscultation - no rales, rhonchi or wheezes  CV: regular rate and rhythm, normal S1 S2, no S3 or S4, no murmur, click or rub, no peripheral edema and peripheral pulses strong  ABDOMEN: soft, nontender, no hepatosplenomegaly, no masses and bowel sounds normal  MS: no gross musculoskeletal defects noted, no edema  SKIN: no suspicious lesions or rashes  NEURO: Normal strength and tone, mentation intact and speech normal  PSYCH: mentation appears normal, affect normal/bright        Answers for HPI/ROS submitted by the patient on 6/13/2023  How many servings of fruits and vegetables do you eat daily?: 2-3  On average, how many sweetened beverages do you drink each day (Examples: soda, juice, sweet tea, etc.  Do NOT count diet or artificially sweetened beverages)?: 1  How many minutes a day do you exercise enough to make your heart beat faster?: 9 or less  How many days a week do you exercise enough to make your heart beat faster?: 3 or less  How many days per week do you miss taking your medication?: 0  What is the reason for your visit today?: shortness of breath, little energy  When did your symptoms begin?: More than a month  What are your symptoms?: episodes of shortness of breath, fatigue  How would you describe these symptoms?: Moderate  Are your symptoms:: Staying the same  Have you had these symptoms before?: No  Is there anything that makes you feel worse?: no  Is there anything that makes you feel better?: resting, sitting down

## 2023-06-14 ENCOUNTER — OFFICE VISIT (OUTPATIENT)
Dept: FAMILY MEDICINE | Facility: CLINIC | Age: 78
End: 2023-06-14
Payer: COMMERCIAL

## 2023-06-14 VITALS
HEART RATE: 86 BPM | RESPIRATION RATE: 15 BRPM | BODY MASS INDEX: 23.54 KG/M2 | TEMPERATURE: 97.6 F | OXYGEN SATURATION: 96 % | HEIGHT: 70 IN | DIASTOLIC BLOOD PRESSURE: 75 MMHG | SYSTOLIC BLOOD PRESSURE: 132 MMHG | WEIGHT: 164.4 LBS

## 2023-06-14 DIAGNOSIS — S22.088A OTHER FRACTURE OF T11-T12 VERTEBRA, INITIAL ENCOUNTER FOR CLOSED FRACTURE (H): ICD-10-CM

## 2023-06-14 DIAGNOSIS — R53.83 OTHER FATIGUE: ICD-10-CM

## 2023-06-14 DIAGNOSIS — J43.9 PULMONARY EMPHYSEMA, UNSPECIFIED EMPHYSEMA TYPE (H): Primary | ICD-10-CM

## 2023-06-14 DIAGNOSIS — M81.0 OSTEOPOROSIS, UNSPECIFIED OSTEOPOROSIS TYPE, UNSPECIFIED PATHOLOGICAL FRACTURE PRESENCE: ICD-10-CM

## 2023-06-14 DIAGNOSIS — R06.02 SHORTNESS OF BREATH: ICD-10-CM

## 2023-06-14 LAB
ALBUMIN SERPL BCG-MCNC: 4.5 G/DL (ref 3.5–5.2)
ALP SERPL-CCNC: 43 U/L (ref 35–104)
ALT SERPL W P-5'-P-CCNC: 28 U/L (ref 0–50)
ANION GAP SERPL CALCULATED.3IONS-SCNC: 13 MMOL/L (ref 7–15)
AST SERPL W P-5'-P-CCNC: 31 U/L (ref 0–45)
BASOPHILS # BLD AUTO: 0 10E3/UL (ref 0–0.2)
BASOPHILS NFR BLD AUTO: 1 %
BILIRUB SERPL-MCNC: 0.5 MG/DL
BUN SERPL-MCNC: 15.6 MG/DL (ref 8–23)
CALCIUM SERPL-MCNC: 9.2 MG/DL (ref 8.8–10.2)
CHLORIDE SERPL-SCNC: 106 MMOL/L (ref 98–107)
CREAT SERPL-MCNC: 0.92 MG/DL (ref 0.51–0.95)
DEPRECATED HCO3 PLAS-SCNC: 23 MMOL/L (ref 22–29)
EOSINOPHIL # BLD AUTO: 0.2 10E3/UL (ref 0–0.7)
EOSINOPHIL NFR BLD AUTO: 3 %
ERYTHROCYTE [DISTWIDTH] IN BLOOD BY AUTOMATED COUNT: 13.7 % (ref 10–15)
GFR SERPL CREATININE-BSD FRML MDRD: 64 ML/MIN/1.73M2
GLUCOSE SERPL-MCNC: 93 MG/DL (ref 70–99)
HCT VFR BLD AUTO: 41.6 % (ref 35–47)
HGB BLD-MCNC: 13.8 G/DL (ref 11.7–15.7)
IMM GRANULOCYTES # BLD: 0 10E3/UL
IMM GRANULOCYTES NFR BLD: 0 %
LYMPHOCYTES # BLD AUTO: 1.9 10E3/UL (ref 0.8–5.3)
LYMPHOCYTES NFR BLD AUTO: 24 %
MCH RBC QN AUTO: 31.6 PG (ref 26.5–33)
MCHC RBC AUTO-ENTMCNC: 33.2 G/DL (ref 31.5–36.5)
MCV RBC AUTO: 95 FL (ref 78–100)
MONOCYTES # BLD AUTO: 0.7 10E3/UL (ref 0–1.3)
MONOCYTES NFR BLD AUTO: 9 %
NEUTROPHILS # BLD AUTO: 4.8 10E3/UL (ref 1.6–8.3)
NEUTROPHILS NFR BLD AUTO: 63 %
PLATELET # BLD AUTO: 245 10E3/UL (ref 150–450)
POTASSIUM SERPL-SCNC: 4.4 MMOL/L (ref 3.4–5.3)
PROT SERPL-MCNC: 7.3 G/DL (ref 6.4–8.3)
RBC # BLD AUTO: 4.37 10E6/UL (ref 3.8–5.2)
SODIUM SERPL-SCNC: 142 MMOL/L (ref 136–145)
TSH SERPL DL<=0.005 MIU/L-ACNC: 1.65 UIU/ML (ref 0.3–4.2)
WBC # BLD AUTO: 7.7 10E3/UL (ref 4–11)

## 2023-06-14 PROCEDURE — 36415 COLL VENOUS BLD VENIPUNCTURE: CPT | Performed by: PHYSICIAN ASSISTANT

## 2023-06-14 PROCEDURE — 93000 ELECTROCARDIOGRAM COMPLETE: CPT | Performed by: PHYSICIAN ASSISTANT

## 2023-06-14 PROCEDURE — 99214 OFFICE O/P EST MOD 30 MIN: CPT | Performed by: PHYSICIAN ASSISTANT

## 2023-06-14 PROCEDURE — 80053 COMPREHEN METABOLIC PANEL: CPT | Performed by: PHYSICIAN ASSISTANT

## 2023-06-14 PROCEDURE — 85025 COMPLETE CBC W/AUTO DIFF WBC: CPT | Performed by: PHYSICIAN ASSISTANT

## 2023-06-14 PROCEDURE — 84443 ASSAY THYROID STIM HORMONE: CPT | Performed by: PHYSICIAN ASSISTANT

## 2023-06-14 RX ORDER — ALENDRONATE SODIUM 70 MG/1
70 TABLET ORAL
Qty: 30 TABLET | Refills: 1 | Status: SHIPPED | OUTPATIENT
Start: 2023-06-14 | End: 2024-01-23

## 2023-06-14 ASSESSMENT — PAIN SCALES - GENERAL: PAINLEVEL: NO PAIN (0)

## 2023-06-15 NOTE — RESULT ENCOUNTER NOTE
Rudy DAN,    It was nice seeing you yesterday!  Labs look perfect.  Electrolytes, kidney function, liver function, as well as blood counts and thyroid function all look good.    Let me know if you have any questions or concerns,     Tejinder Gonsalez PA-C  [facility]

## 2023-06-28 ENCOUNTER — HOSPITAL ENCOUNTER (OUTPATIENT)
Dept: CARDIAC REHAB | Facility: CLINIC | Age: 78
Discharge: HOME | End: 2023-06-28
Attending: PHYSICIAN ASSISTANT
Payer: COMMERCIAL

## 2023-06-28 DIAGNOSIS — R06.02 SHORTNESS OF BREATH: ICD-10-CM

## 2023-06-28 DIAGNOSIS — J43.9 PULMONARY EMPHYSEMA, UNSPECIFIED EMPHYSEMA TYPE (H): ICD-10-CM

## 2023-06-28 PROCEDURE — 94729 DIFFUSING CAPACITY: CPT

## 2023-06-28 PROCEDURE — 94726 PLETHYSMOGRAPHY LUNG VOLUMES: CPT

## 2023-06-28 PROCEDURE — 94060 EVALUATION OF WHEEZING: CPT

## 2023-07-03 ENCOUNTER — TELEPHONE (OUTPATIENT)
Dept: FAMILY MEDICINE | Facility: CLINIC | Age: 78
End: 2023-07-03
Payer: COMMERCIAL

## 2023-07-03 DIAGNOSIS — E78.5 HYPERLIPIDEMIA LDL GOAL <70: ICD-10-CM

## 2023-07-03 DIAGNOSIS — I73.9 CLAUDICATION (H): ICD-10-CM

## 2023-07-03 RX ORDER — ROSUVASTATIN CALCIUM 40 MG/1
TABLET, COATED ORAL
Qty: 90 TABLET | Refills: 2 | Status: SHIPPED | OUTPATIENT
Start: 2023-07-03 | End: 2023-12-14

## 2023-07-03 NOTE — TELEPHONE ENCOUNTER
"rosuvastatin (CRESTOR) 40 MG tablet  Last Written Prescription Date:  4/4/23  Last Fill Quantity: 90,  # refills: 0    Last office visit:  4/25/23  Follow up recommended:  One year    Requested Prescriptions   Pending Prescriptions Disp Refills     rosuvastatin (CRESTOR) 40 MG tablet [Pharmacy Med Name: ROSUVASTATIN 40MG TABLETS] 90 tablet 0     Sig: TAKE 1 TABLET(40 MG) BY MOUTH DAILY       Statins Protocol Passed - 7/3/2023 10:14 AM        Passed - LDL on file in past 12 months     Recent Labs   Lab Test 04/18/23  0910   LDL 48             Passed - No abnormal creatine kinase in past 12 months     No lab results found.             Passed - Recent (12 mo) or future (30 days) visit within the authorizing provider's specialty     Patient has had an office visit with the authorizing provider or a provider within the authorizing providers department within the previous 12 mos or has a future within next 30 days. See \"Patient Info\" tab in inbasket, or \"Choose Columns\" in Meds & Orders section of the refill encounter.              Passed - Medication is active on med list        Passed - Patient is age 18 or older        Passed - No active pregnancy on record        Passed - No positive pregnancy test in past 12 months           Prescription approved per [facility] Refill Protocol.           "

## 2023-07-03 NOTE — TELEPHONE ENCOUNTER
"TO PA-C    Patient calling stating, \"I had a pulmonary test done last Wednesday and haven't heard anything yet. Wondering if he received the results. Please have him call my cell number if he calls today.\"    Shira Lopez RN on 7/3/2023 at 10:33 AM    "

## 2023-07-04 LAB
DLCOCOR-%PRED-PRE: 91 %
DLCOCOR-PRE: 19.14 ML/MIN/MMHG
DLCOUNC-%PRED-PRE: 92 %
DLCOUNC-PRE: 19.37 ML/MIN/MMHG
DLCOUNC-PRED: 20.97 ML/MIN/MMHG
ERV-%PRED-PRE: 50 %
ERV-PRE: 0.42 L
ERV-PRED: 0.83 L
EXPTIME-PRE: 9.78 SEC
FEF2575-%PRED-POST: 59 %
FEF2575-%PRED-PRE: 43 %
FEF2575-POST: 1.05 L/SEC
FEF2575-PRE: 0.77 L/SEC
FEF2575-PRED: 1.76 L/SEC
FEFMAX-%PRED-PRE: 82 %
FEFMAX-PRE: 4.94 L/SEC
FEFMAX-PRED: 5.96 L/SEC
FEV1-%PRED-PRE: 73 %
FEV1-PRE: 1.63 L
FEV1FEV6-PRE: 66 %
FEV1FEV6-PRED: 78 %
FEV1FVC-PRE: 63 %
FEV1FVC-PRED: 78 %
FEV1SVC-PRE: 64 %
FEV1SVC-PRED: 69 %
FIFMAX-PRE: 3.92 L/SEC
FRCPLETH-%PRED-PRE: 111 %
FRCPLETH-PRE: 3.82 L
FRCPLETH-PRED: 3.44 L
FVC-%PRED-PRE: 89 %
FVC-PRE: 2.6 L
FVC-PRED: 2.9 L
IC-%PRED-PRE: 89 %
IC-PRE: 2.12 L
IC-PRED: 2.37 L
RVPLETH-%PRED-PRE: 136 %
RVPLETH-PRE: 3.38 L
RVPLETH-PRED: 2.48 L
TLCPLETH-%PRED-PRE: 102 %
TLCPLETH-PRE: 5.94 L
TLCPLETH-PRED: 5.8 L
VA-%PRED-PRE: 94 %
VA-PRE: 4.98 L
VC-%PRED-PRE: 79 %
VC-PRE: 2.56 L
VC-PRED: 3.22 L

## 2023-07-05 NOTE — TELEPHONE ENCOUNTER
Called with PFT results which overall look really good considering history of smoking - pursue stress test next. Reviewed signs and symptoms that warrant urgent/emergent signs in the meantime.

## 2023-07-10 ENCOUNTER — HOSPITAL ENCOUNTER (OUTPATIENT)
Dept: CARDIOLOGY | Facility: CLINIC | Age: 78
Discharge: HOME | End: 2023-07-10
Attending: PHYSICIAN ASSISTANT
Payer: COMMERCIAL

## 2023-07-10 VITALS
SYSTOLIC BLOOD PRESSURE: 92 MMHG | OXYGEN SATURATION: 98 % | HEART RATE: 91 BPM | DIASTOLIC BLOOD PRESSURE: 62 MMHG | BODY MASS INDEX: 23.16 KG/M2 | WEIGHT: 161.8 LBS | HEIGHT: 70 IN

## 2023-07-10 DIAGNOSIS — R06.02 SHORTNESS OF BREATH: ICD-10-CM

## 2023-07-10 LAB
CV BLOOD PRESSURE: 76 MMHG
CV STRESS MAX HR HE: 108
NUC STRESS EJECTION FRACTION: 60 %
RATE PRESSURE PRODUCT: 9072
STRESS ECHO BASELINE DIASTOLIC HE: 62
STRESS ECHO BASELINE HR: 93 BPM
STRESS ECHO BASELINE SYSTOLIC BP: 92
STRESS ECHO CALCULATED PERCENT HR: 75 %
STRESS ECHO LAST STRESS DIASTOLIC BP: 46
STRESS ECHO LAST STRESS SYSTOLIC BP: 84
STRESS ECHO TARGET HR: 144

## 2023-07-10 PROCEDURE — 93018 CV STRESS TEST I&R ONLY: CPT | Performed by: INTERNAL MEDICINE

## 2023-07-10 PROCEDURE — 93016 CV STRESS TEST SUPVJ ONLY: CPT | Performed by: INTERNAL MEDICINE

## 2023-07-10 PROCEDURE — 78452 HT MUSCLE IMAGE SPECT MULT: CPT | Mod: 26 | Performed by: INTERNAL MEDICINE

## 2023-07-10 PROCEDURE — A9502 TC99M TETROFOSMIN: HCPCS | Performed by: PHYSICIAN ASSISTANT

## 2023-07-10 PROCEDURE — 93017 CV STRESS TEST TRACING ONLY: CPT

## 2023-07-10 PROCEDURE — 250N000011 HC RX IP 250 OP 636: Performed by: INTERNAL MEDICINE

## 2023-07-10 PROCEDURE — 343N000001 HC RX 343: Performed by: PHYSICIAN ASSISTANT

## 2023-07-10 RX ORDER — AMINOPHYLLINE 25 MG/ML
50-100 INJECTION, SOLUTION INTRAVENOUS
Status: DISCONTINUED | OUTPATIENT
Start: 2023-07-10 | End: 2023-07-11 | Stop reason: HOSPADM

## 2023-07-10 RX ORDER — CAFFEINE CITRATE 20 MG/ML
60 SOLUTION INTRAVENOUS
Status: DISCONTINUED | OUTPATIENT
Start: 2023-07-10 | End: 2023-07-11 | Stop reason: HOSPADM

## 2023-07-10 RX ORDER — REGADENOSON 0.08 MG/ML
0.4 INJECTION, SOLUTION INTRAVENOUS ONCE
Status: COMPLETED | OUTPATIENT
Start: 2023-07-10 | End: 2023-07-10

## 2023-07-10 RX ORDER — ACYCLOVIR 200 MG/1
0-1 CAPSULE ORAL
Status: DISCONTINUED | OUTPATIENT
Start: 2023-07-10 | End: 2023-07-11 | Stop reason: HOSPADM

## 2023-07-10 RX ORDER — ALBUTEROL SULFATE 90 UG/1
2 AEROSOL, METERED RESPIRATORY (INHALATION) EVERY 5 MIN PRN
Status: DISCONTINUED | OUTPATIENT
Start: 2023-07-10 | End: 2023-07-11 | Stop reason: HOSPADM

## 2023-07-10 RX ADMIN — TETROFOSMIN 3.38 MILLICURIE: 1.38 INJECTION, POWDER, LYOPHILIZED, FOR SOLUTION INTRAVENOUS at 09:32

## 2023-07-10 RX ADMIN — REGADENOSON 0.4 MG: 0.08 INJECTION, SOLUTION INTRAVENOUS at 10:48

## 2023-07-10 RX ADMIN — TETROFOSMIN 9.28 MILLICURIE: 1.38 INJECTION, POWDER, LYOPHILIZED, FOR SOLUTION INTRAVENOUS at 10:52

## 2023-07-11 ENCOUNTER — MYC MEDICAL ADVICE (OUTPATIENT)
Dept: FAMILY MEDICINE | Facility: CLINIC | Age: 78
End: 2023-07-11
Payer: COMMERCIAL

## 2023-07-11 DIAGNOSIS — Z87.891 FORMER SMOKER: Primary | ICD-10-CM

## 2023-07-11 RX ORDER — ALBUTEROL SULFATE 90 UG/1
2 AEROSOL, METERED RESPIRATORY (INHALATION) EVERY 6 HOURS PRN
Qty: 18 G | Refills: 1 | Status: SHIPPED | OUTPATIENT
Start: 2023-07-11

## 2023-07-11 NOTE — RESULT ENCOUNTER NOTE
Rudy DAN,    Happy to report your stress test was normal which is a really good sign. Compared to your old study in 2021 nothing has changed.     Let me know if you have any questions and keep me posted how you are feeling.     Mustapha

## 2023-07-11 NOTE — TELEPHONE ENCOUNTER
Call patient with results of Lexiscan.  Pulmonary test revealed mild airway obstruction.  Suspect her symptoms to potentiallybe related to some level of deconditioning.  Will trial albuterol inhaler.  However, bronchodilation did not improve lung test during PFTs.  Close follow-up with any new or worsening symptoms.

## 2023-08-22 DIAGNOSIS — I10 ESSENTIAL HYPERTENSION: ICD-10-CM

## 2023-08-22 RX ORDER — LOSARTAN POTASSIUM 50 MG/1
TABLET ORAL
Qty: 90 TABLET | Refills: 2 | Status: SHIPPED | OUTPATIENT
Start: 2023-08-22 | End: 2023-12-14

## 2023-08-22 NOTE — TELEPHONE ENCOUNTER
"Last Written Prescription Date:  5/22/23  Last Fill Quantity: 90,  # refills: 0   Last office visit: 4/25/2023 ; last virtual visit: Visit date not found with prescribing provider:       Future Office Visit:      Requested Prescriptions   Pending Prescriptions Disp Refills    losartan (COZAAR) 50 MG tablet [Pharmacy Med Name: LOSARTAN 50MG TABLETS] 90 tablet 0     Sig: TAKE 1 TABLET(50 MG) BY MOUTH DAILY       Angiotensin-II Receptors Passed - 8/22/2023 12:59 PM        Passed - Last blood pressure under 140/90 in past 12 months     BP Readings from Last 3 Encounters:   07/10/23 92/62   06/14/23 132/75   04/25/23 (!) 165/83                 Passed - Recent (12 mo) or future (30 days) visit within the authorizing provider's specialty     Patient has had an office visit with the authorizing provider or a provider within the authorizing providers department within the previous 12 mos or has a future within next 30 days. See \"Patient Info\" tab in inbasket, or \"Choose Columns\" in Meds & Orders section of the refill encounter.              Passed - Medication is active on med list        Passed - Patient is age 18 or older        Passed - No active pregnancy on record        Passed - Normal serum creatinine on file in past 12 months     Recent Labs   Lab Test 06/14/23  1201 11/02/17  1606 05/18/17  1142   CR 0.92   < >  --    CREAT  --   --  0.8    < > = values in this interval not displayed.       Ok to refill medication if creatinine is low          Passed - Normal serum potassium on file in past 12 months     Recent Labs   Lab Test 06/14/23  1201   POTASSIUM 4.4                    Passed - No positive pregnancy test in past 12 months           Prescription approved per [facility] Refill Protocol.    Sophia LIVINGSTON, RN    [facility]  Office: [phone]  Fax: [phone]      "

## 2023-10-06 ENCOUNTER — NURSE TRIAGE (OUTPATIENT)
Dept: NURSING | Facility: CLINIC | Age: 78
End: 2023-10-06
Payer: COMMERCIAL

## 2023-10-06 ENCOUNTER — NURSE TRIAGE (OUTPATIENT)
Dept: FAMILY MEDICINE | Facility: CLINIC | Age: 78
End: 2023-10-06
Payer: COMMERCIAL

## 2023-10-06 NOTE — TELEPHONE ENCOUNTER
Called patient and left voicemail.  Previous message states patient was told she would receive a call from covid hub RN tonight at 7 p.m.  Covid hub not available after 4 p.m. and over weekend.  Informed patient she will need to return call at [phone] when she returns from [place] if still interested in Paxlovid treatment.  Betsy Hooper RN

## 2023-10-06 NOTE — TELEPHONE ENCOUNTER
Pt called from [place]. She is on her way home but tested positive for COVID-19 today. She has sore throat, body aches and HA since yesterday morning. Pt denies chest pain, fever or breathing problems, but is requesting a prescription for Paxlovid. Triage advised pt on red flag symptoms severe chest pain or breathing problems she should see care at ER. Also advised her on COVID-19 quarantine. Explained that a VV is needed to discuss Paxlovid or appropriate Tx since there can be medication interactions and may need to hold some medications. VV cannot be done if she is out of the country or the state. Pt verbalized understanding. She is at airport during call and will be on flight for 8 hours.  She wants message sent to RN hub so she can be contacted [facility]. She plans on been home around 7:00 PM tonight.     Routing message to PCP per patients request and to RN hub for follow up.     Reason for Disposition   [1] Age 12 and above AND [2] COVID-19 lab test positive AND [3] HIGH-RISK patient for complications with COVID-19  (See that CDC List)    Additional Information   Negative: Severe difficulty breathing (struggling for each breath, unable to speak or cry, making grunting noises with each breath, severe retractions) (Triage tip: Listen to the child's breathing.)   Negative: Slow, shallow, weak breathing   Negative: [1] Bluish (or gray) lips or face now AND [2] persists when not coughing   Negative: Difficult to awaken or not alert when awake (confusion)   Negative: Very weak (doesn't move or make eye contact)   Negative: Sounds like a life-threatening emergency to the triager   Negative: [1] Had lab test confirmed COVID-19 infection within last 3 months AND [2] new-onset of COVID-19 possible symptoms AND [3] no NEW variant strains in community   Negative: [1] Stridor (harsh, raspy sound heard with breathing in) AND [2] confirmed by triager   Negative: Runny nose from nasal allergies   Negative: [1] Headache is  isolated symptom (no fever) AND [2] no known COVID-19 close contact   Negative: [1] Vomiting is isolated symptom (no fever) AND [2] no known COVID-19 close contact   Negative: [1] Diarrhea is isolated symptom (no fever) AND [2] no known COVID-19 close contact   Negative: [1] COVID-19 exposure AND [2] NO symptoms   Negative: [1] COVID-19 vaccine general reaction (fever, headache, muscle aches, fatigue) AND [2] starts within 48 hours of shot (Note: vaccine does not cause respiratory symptoms. Stay here for those symptoms.)   Negative: COVID-19 vaccine, questions about   Negative: [1] Diagnosed with influenza within the last 2 weeks by a HCP AND [2] follow-up call   Negative: [1] Household exposure to known influenza (flu test positive) AND [2] child with influenza-like symptoms   Negative: [1] Difficulty breathing confirmed by triager BUT [2] not severe (Triage tip: Listen to the child's breathing.)   Negative: Ribs are pulling in with each breath (retractions)   Negative: [1] Age < 12 weeks AND [2] fever 100.4 F (38.0 C) or higher rectally   Negative: SEVERE chest pain or pressure (excruciating)   Negative: [1] Oxygen level <92% (<90% if altitude > 5000 feet) AND [2] any trouble breathing   Negative: [1] Stridor (harsh sound with breathing in) AND [2] doesn't respond to 20 minutes of warm mist OR has occurred 2 or more times   Negative: Rapid breathing (Breaths/min > 60 if < 2 mo; > 50 if 2-12 mo; > 40 if 1-5 years; > 30 if 6-11 years; > 20 if > 12 years)   Negative: [1] MODERATE chest pain or pressure (by caller's report) AND [2] can't take a deep breath   Negative: [1] Fever AND [2] > 105 F (40.6 C) by any route OR axillary > 104 F (40 C)   Negative: [1] Shaking chills (shivering) AND [2] present constantly > 30 minutes   Negative: [1] Sore throat AND [2] complication suspected (refuses to drink, can't swallow fluids, new-onset drooling, can't move neck normally or other serious symptom)   Negative: [1] Muscle or  body pains AND [2] complication suspected (can't stand, can't walk, can barely walk, can't move arm or hand normally or other serious symptom)   Negative: [1] Headache AND [2] complication suspected (stiff neck, incapacitated by pain, worst headache ever, confused, weakness or other serious symptom)   Negative: [1] Dehydration suspected AND [2] age < 1 year (signs: no urine > 8 hours AND very dry mouth, no  tears, ill-appearing, etc.)   Negative: [1] Dehydration suspected AND [2] age > 1 year (signs: no urine > 12 hours AND very dry mouth, no tears, ill-appearing, etc.)   Negative: Child sounds very sick or weak to the triager   Negative: [1] Wheezing confirmed by triager AND [2] no trouble breathing (Exception: known asthmatic)   Negative: [1] Lips or face have turned bluish BUT [2] only during coughing fits   Negative: [1] Age < 3 months AND [2] lots of coughing   Negative: [1] Crying continuously AND [2] cannot be comforted AND [3] present > 2 hours   Negative: [1] Oxygen level <92% (90% if altitude > 5000 feet) AND [2] no trouble breathing   Negative: [1] SEVERE RISK patient (e.g., immuno-compromised, serious lung disease, on oxygen, heart disease, bedridden, etc) AND [2] suspected COVID-19 with mild symptoms (Exception: Already seen by PCP and no new or worsening symptoms.)   Negative: [1] Age less than 12 weeks AND [2] suspected COVID-19 with mild symptoms   Negative: Multisystem Inflammatory Syndrome (MIS-C) suspected (Fever AND 2 or more of the following:  widespread red rash, red eyes, red lips, red palms/soles, swollen hands/feet, abdominal pain, vomiting, diarrhea)   Negative: [1] Stridor (harsh sound with breathing in) occurred once BUT [2] not present now   Negative: [1] Continuous coughing keeps from playing or sleeping AND [2] no improvement using cough treatment per guideline   Negative: Earache or ear discharge also present   Negative: Strep throat infection suspected by triager   Negative: [1] Age  3-6 months AND [2] fever present > 24 hours AND [3] without other symptoms (no cold, cough, diarrhea, etc.)   Negative: [1] Age 6 - 24 months AND [2] fever present > 24 hours AND [3] without other symptoms (no cold, diarrhea, etc.) AND [4] fever > 102 F (39 C) by any route OR axillary > 101 F (38.3 C)   Negative: [1] Fever returns after gone for over 24 hours AND [2] symptoms worse or not improved   Negative: Fever present > 3 days (72 hours)   Negative: [1] Age > 5 years AND [2] sinus pain around cheekbone or eye (not just congestion) AND [3] fever    Protocols used: Coronavirus (COVID-19) Diagnosed or [id number]

## 2023-10-07 ENCOUNTER — OFFICE VISIT (OUTPATIENT)
Dept: URGENT CARE | Facility: URGENT CARE | Age: 78
End: 2023-10-07
Payer: COMMERCIAL

## 2023-10-07 VITALS
TEMPERATURE: 96.9 F | BODY MASS INDEX: 23.1 KG/M2 | OXYGEN SATURATION: 99 % | DIASTOLIC BLOOD PRESSURE: 79 MMHG | SYSTOLIC BLOOD PRESSURE: 134 MMHG | WEIGHT: 161 LBS | HEART RATE: 93 BPM | RESPIRATION RATE: 14 BRPM

## 2023-10-07 DIAGNOSIS — U07.1 INFECTION DUE TO 2019 NOVEL CORONAVIRUS: Primary | ICD-10-CM

## 2023-10-07 PROCEDURE — 99214 OFFICE O/P EST MOD 30 MIN: CPT | Performed by: PHYSICIAN ASSISTANT

## 2023-10-07 NOTE — PROGRESS NOTES
Patient presents with:  Covid Concern: Patient tested positive COVID on Thursday with sore throat, fever and fatigue. Patient is requesting Paxlovid medication.     (U07.1) Infection due to 2019 novel coronavirus  (primary encounter diagnosis)  Comment:   Plan: nirmatrelvir and ritonavir (PAXLOVID) 300         mg/100 mg therapy pack          See the CDC website on Covid 19 - calcuator for time of isolation, etc.     Isolation and Precautions for People with COVID-19  CDC         If not improving follow up with your Primary Care Provider.  If symptoms worsen please be evaluated in the emergency department.    31 minutes spent by me on the date of the encounter doing chart review, patient visit, documentation, and review of all potential Paxlovid drug interactions with patient's medications.  Advised patient to hold her cholesterol medication while on the Paxlovid.        SUBJECTIVE:   Ramya Hillman is a 78 year old female who presents today with positive COVID tests yesterday and the day prior.  Onset of sore throat fever and fatigue was 2 days ago as well.  She returned from [place] last night.  She denies any chest pain or shortness of breath.  She would like the Paxlovid antiviral treatment today.      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 (Patient not taking: Reported on 10/19/2020) 30 tablet  7     Social History     Tobacco Use    Smoking status: Never Smoker    Smokeless tobacco: Never Used   Substance Use Topics    Alcohol use: Not on file     Family History   Problem Relation Age of Onset    Diabetes Mother     Diabetes Father          ROS:    10 point ROS of systems including Constitutional, Eyes, Respiratory, Cardiovascular, Gastroenterology, Genitourinary, Integumentary, Muscularskeletal, Psychiatric ,neurological were all negative except for pertinent positives noted in my HPI       OBJECTIVE:  /79 (BP Location: Left arm, Patient Position: Sitting, Cuff Size: Adult Large)   Pulse 93   Temp 96.9  F (36.1  C) (Tympanic)   Resp 14   Wt 73 kg (161 lb)   SpO2 99%   BMI 23.10 kg/m    Physical Exam:  GENERAL APPEARANCE: healthy, alert and no distress  RESP: lungs clear to auscultation - no rales, rhonchi or wheezes  CV: regular rates and rhythm, normal S1 S2, no murmur noted  SKIN: no suspicious lesions or rashes

## 2023-10-07 NOTE — TELEPHONE ENCOUNTER
"Pt reports \"sore throat, aches and pains and headache\". Pt declines triage and requests Paxlovid prescription \"as soon as possible\".     Writer advised pt she can contact [facility] through website or [phone] or [facility] pharmacy for assistance. Call back with any other questions or concerns.     Pt verbalizes understanding and agrees to plan.     Reason for Disposition   Health Information question, no triage required and triager able to answer question    Protocols used: Information Only Call - No Triage-A-    "

## 2023-10-07 NOTE — PATIENT INSTRUCTIONS
(U07.1) Infection due to 2019 novel coronavirus  (primary encounter diagnosis)  Comment:   Plan: nirmatrelvir and ritonavir (PAXLOVID) 300         mg/100 mg therapy pack          See the CDC website on Covid 19 - calcuator for time of isolation, etc.     Isolation and Precautions for People with COVID-19  CDC

## 2023-10-26 ENCOUNTER — TRANSFERRED RECORDS (OUTPATIENT)
Dept: HEALTH INFORMATION MANAGEMENT | Facility: CLINIC | Age: 78
End: 2023-10-26
Payer: COMMERCIAL

## 2023-11-29 ENCOUNTER — PATIENT OUTREACH (OUTPATIENT)
Dept: CARE COORDINATION | Facility: CLINIC | Age: 78
End: 2023-11-29
Payer: COMMERCIAL

## 2023-11-30 ENCOUNTER — LAB (OUTPATIENT)
Dept: LAB | Facility: CLINIC | Age: 78
End: 2023-11-30
Payer: COMMERCIAL

## 2023-11-30 DIAGNOSIS — E78.5 HYPERLIPIDEMIA LDL GOAL <70: ICD-10-CM

## 2023-11-30 DIAGNOSIS — R73.01 IFG (IMPAIRED FASTING GLUCOSE): ICD-10-CM

## 2023-11-30 LAB
APO A-I SERPL-MCNC: 43 MG/DL
CRP SERPL HS-MCNC: 1.72 MG/L
HBA1C MFR BLD: 5.7 % (ref 0–5.6)

## 2023-11-30 PROCEDURE — 83704 LIPOPROTEIN BLD QUAN PART: CPT | Mod: 90

## 2023-11-30 PROCEDURE — 80061 LIPID PANEL: CPT | Mod: 90

## 2023-11-30 PROCEDURE — 36415 COLL VENOUS BLD VENIPUNCTURE: CPT

## 2023-11-30 PROCEDURE — 83036 HEMOGLOBIN GLYCOSYLATED A1C: CPT

## 2023-11-30 PROCEDURE — 86141 C-REACTIVE PROTEIN HS: CPT

## 2023-11-30 PROCEDURE — 99000 SPECIMEN HANDLING OFFICE-LAB: CPT

## 2023-11-30 PROCEDURE — 83695 ASSAY OF LIPOPROTEIN(A): CPT

## 2023-12-04 LAB
CHOLEST SERPL-MCNC: 145 MG/DL
HDL SERPL QN: 9.7 NM
HDL SERPL-SCNC: 38 UMOL/L
HDLC SERPL-MCNC: 74 MG/DL
HLD.LARGE SERPL-SCNC: 11.1 UMOL/L
LDL SERPL QN: 21.3 NM
LDL SERPL-SCNC: 574 NMOL/L
LDL SMALL SERPL-SCNC: <165 NMOL/L
LDLC SERPL CALC-MCNC: 55 MG/DL
PATHOLOGY STUDY: ABNORMAL
TRIGL SERPL-MCNC: 81 MG/DL
VLDL LARGE SERPL-SCNC: <1.5 NMOL/L
VLDL SERPL QN: 48.5 NM

## 2023-12-14 ENCOUNTER — HOSPITAL ENCOUNTER (OUTPATIENT)
Dept: ULTRASOUND IMAGING | Facility: CLINIC | Age: 78
Discharge: HOME | End: 2023-12-14
Attending: INTERNAL MEDICINE
Payer: COMMERCIAL

## 2023-12-14 ENCOUNTER — OFFICE VISIT (OUTPATIENT)
Dept: OTHER | Facility: CLINIC | Age: 78
End: 2023-12-14
Attending: INTERNAL MEDICINE
Payer: COMMERCIAL

## 2023-12-14 ENCOUNTER — TELEPHONE (OUTPATIENT)
Dept: OTHER | Facility: CLINIC | Age: 78
End: 2023-12-14

## 2023-12-14 VITALS
SYSTOLIC BLOOD PRESSURE: 171 MMHG | OXYGEN SATURATION: 100 % | DIASTOLIC BLOOD PRESSURE: 139 MMHG | BODY MASS INDEX: 23.48 KG/M2 | HEIGHT: 70 IN | HEART RATE: 78 BPM | WEIGHT: 164 LBS

## 2023-12-14 DIAGNOSIS — I73.9 CLAUDICATION (H): ICD-10-CM

## 2023-12-14 DIAGNOSIS — I10 ESSENTIAL HYPERTENSION: ICD-10-CM

## 2023-12-14 DIAGNOSIS — E78.5 HYPERLIPIDEMIA LDL GOAL <70: ICD-10-CM

## 2023-12-14 PROCEDURE — 93924 LWR XTR VASC STDY BILAT: CPT

## 2023-12-14 PROCEDURE — 99214 OFFICE O/P EST MOD 30 MIN: CPT | Performed by: INTERNAL MEDICINE

## 2023-12-14 PROCEDURE — G0463 HOSPITAL OUTPT CLINIC VISIT: HCPCS | Mod: 25 | Performed by: INTERNAL MEDICINE

## 2023-12-14 RX ORDER — ROSUVASTATIN CALCIUM 40 MG/1
40 TABLET, COATED ORAL DAILY
Qty: 90 TABLET | Refills: 3 | Status: SHIPPED | OUTPATIENT
Start: 2023-12-14

## 2023-12-14 RX ORDER — METOPROLOL TARTRATE 25 MG/1
12.5 TABLET, FILM COATED ORAL 2 TIMES DAILY
Qty: 60 TABLET | Refills: 1 | Status: SHIPPED | OUTPATIENT
Start: 2023-12-14 | End: 2023-12-14

## 2023-12-14 RX ORDER — LOSARTAN POTASSIUM 50 MG/1
50 TABLET ORAL DAILY
Qty: 90 TABLET | Refills: 3 | Status: SHIPPED | OUTPATIENT
Start: 2023-12-14

## 2023-12-14 RX ORDER — METOPROLOL TARTRATE 25 MG/1
12.5 TABLET, FILM COATED ORAL 2 TIMES DAILY
Qty: 90 TABLET | Refills: 0 | Status: SHIPPED | OUTPATIENT
Start: 2023-12-14 | End: 2024-03-11

## 2023-12-14 NOTE — PROGRESS NOTES
"Patient is here to discuss follow up    BP (!) 171/139 (BP Location: Right arm, Patient Position: Chair, Cuff Size: Adult Regular)   Pulse 78   Ht 5' 10\" (1.778 m)   Wt 164 lb (74.4 kg)   SpO2 100%   BMI 23.53 kg/m      Questions patient would like addressed today are: N/A.    Refills are needed: No    Has homecare services and agency name:  [facility]    "

## 2023-12-14 NOTE — PROGRESS NOTES
Ramya Hillman is a 78 year old female who is presenting at the current time to discuss her diagnosi(es) of         Claudication (H)  Hyperlipidemia LDL goal <70  IFG (impaired fasting glucose)  Essential hypertension  Gastroesophageal reflux disease without esophagitis           HPI: Ramya Hillamn is a 78 year old female who was seen today regarding bilateral lower leg claudication.  She is a former smoker and who quit smoking all together in 8/2019 after her appointment with Dr. Salinas, who has since moved. When seen on 9/17/2020, the patient could walk about 4 blocks/15 minutes before she would get cramping calf pain that resolves with short rest. That distance was very repeatable. She had improved since 2021 when she could only walk for 2 blocks and four minutes.  She denied chest pain and no stroke or TIA symptoms. Currently, she does not feel her lifestyle is severely limited by her symptoms, and she is now walking regularly. She has no CLI sxs or tissue loss.      On a statin, her LDL <70, LDL-P<1000, Lp(a) moderately elevated at 45. BP today is 165/83. Her BP at home runs 128/78. Her resting ZOILA is  0.56 (post exercise 0.62, previously 0.25)on the right, previously 0.58 with monophasic waveforms;Her left ZOILA is 0.98, previously 0.90 (post exercise 0.80, previously 0.72), with triphasic waveforms. She can currently walk 6 blocks before she develops pain in the RLE. She will stop to rest for 1-2 minutes and her sxs resolve to the point that she can walk again without limitation.This does not appreciably impact her ADLs. She has no nocturnal ischemic rest pain or nonhealing ulcers.     When seen in 4/2022, her BP was elevated. Med changes were not made at that time. She was advised to RTC with a month's worth of BP readings from home. In 05/22, it was noted her SBP averaged 119 at home. Now her SBP averages 128.        Review Of Systems  Skin: negative  Eyes: negative  Ears/Nose/Throat: negative sensation of  right leg discomfort  Respiratory: No shortness of breath, dyspnea on exertion, cough, or hemoptysis  Cardiovascular: sensation of right leg discomfort  Gastrointestinal: negative  Genitourinary: negative  Musculoskeletal: as above  Neurologic: negative  Psychiatric: negative  Hematologic/Lymphatic/Immunologic: negative  Endocrine: negative     PAST MEDICAL HISTORY:                  Past Medical History           Past Medical History:   Diagnosis Date    Arthritis 1/01/2000     Hands & feet    Chest pain      Chronic ischemic heart disease      Condyloma       had for5 years, 20 years ago,recurrence 2012 biopsy taken    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)      Emphysema lung (H) 1/6/2022    Family history of heart disease      Hyperlipidemia      PVD (peripheral vascular disease) (H)      Tobacco abuse              PAST SURGICAL HISTORY:                  Past Surgical History             Past Surgical History:   Procedure Laterality Date    BIOPSY   3/1/2018     Mole on back    BUNIONECTOMY Right 8/26/2019     Procedure: BUNIONECTOMY RIGHT FOOT;  Surgeon: Rajni Sanchez MD;  Location: [facility] OR    COLONOSCOPY   10/18/2018     1 polyp - all clear    NO HISTORY OF SURGERY        ORTHOPEDIC SURGERY   1/1/2012     Rotator Cuff - Shoulder            CURRENT MEDICATIONS:                  Current Outpatient Prescriptions               Current Outpatient Medications   Medication Sig Dispense Refill    aspirin 81 MG tablet Take by mouth daily 1 tablet      Cholecalciferol (VITAMIN D) 1000 UNITS capsule Take 1 capsule by mouth daily        Coenzyme Q10 (CO Q10) 100 MG CAPS Take by mouth daily        losartan (COZAAR) 50 MG tablet Take 1 tablet (50 mg) by mouth daily 30 tablet 3    multivitamin w/minerals (THERA-VIT-M) tablet Take 1 tablet by mouth daily        Omega-3 Fatty Acids (FISH OIL) 1000 MG CPDR Take by mouth daily        rosuvastatin (CRESTOR) 40  MG tablet Take 1 tablet (40 mg) by mouth daily 90 tablet 2            ALLERGIES:                          Allergies   Allergen Reactions    No Known Allergies           SOCIAL HISTORY:                  Social History   Social History                Socioeconomic History    Marital status:        Spouse name: Not on file    Number of children: 0    Years of education: Not on file    Highest education level: Not on file   Occupational History    Occupation: Sales   Tobacco Use    Smoking status: Former Smoker       Packs/day: 0.00       Years: 30.00       Pack years: 0.00       Types: Cigarettes       Start date: 1/1/1990       Quit date: 8/1/2018       Years since quitting: 3.8    Smokeless tobacco: Never Used    Tobacco comment:  2-3 cigs per day off and on   Substance and Sexual Activity    Alcohol use: Yes       Alcohol/week: 0.0 standard drinks       Comment: Very minimal    Drug use: No    Sexual activity: Not Currently       Birth control/protection: Post-menopausal       Comment: postmenopausal   Other Topics Concern     Service Not Asked    Blood Transfusions Not Asked    Caffeine Concern No       Comment: 1 a day. mostly decaf    Occupational Exposure Not Asked    Hobby Hazards Not Asked    Sleep Concern Not Asked    Stress Concern Not Asked    Weight Concern Not Asked    Special Diet Not Asked    Back Care Not Asked    Exercise No       Comment: likes to walk    Bike Helmet Not Asked    Seat Belt Not Asked    Self-Exams Not Asked    Parent/sibling w/ CABG, MI or angioplasty before 65F 55M? Yes   Social History Narrative    Not on file      Social Determinants of Health      Financial Resource Strain: Not on file   Food Insecurity: Not on file   Transportation Needs: Not on file   Physical Activity: Not on file   Stress: Not on file   Social Connections: Not on file   Intimate Partner Violence: Not on file   Housing Stability: Not on file            FAMILY HISTORY:                   Family  History             Family History   Problem Relation Age of Onset    Cerebrovascular Disease Mother      Coronary Artery Disease Mother               Osteoporosis Mother      Hyperlipidemia Mother      Coronary Artery Disease Father 81             Hyperlipidemia Father      Coronary Artery Disease Brother               Hyperlipidemia Brother      Coronary Artery Disease Sister 52             Hyperlipidemia Sister      Coronary Artery Disease Brother 62    Hyperlipidemia Brother      Coronary Artery Disease Sister      Hyperlipidemia Sister      Coronary Artery Disease Sister      Hyperlipidemia Sister      Breast Cancer Sister 80             Breast Cancer Niece      Coronary Artery Disease Sister      Breast Cancer Sister      Coronary Artery Disease Sister                 Component      Latest Ref Rng 2023  9:10 AM   Cholesterol      <200 mg/dL 130    Triglycerides      <150 mg/dL 77    HDL Cholesterol      >=50 mg/dL 67    LDL Cholesterol Calculated      <=100 mg/dL 48    Non HDL Cholesterol      <130 mg/dL 63    Hemoglobin A1C      0.0 - 5.6 % 5.9 (H)       Legend:  (H) High      ULTRASOUND ZOILA DOPPLER WITH EXERCISE BILATERAL 2023 10:23 AM        HISTORY: Claudication (H).     COMPARISON: 2019.     FINDINGS:  Right ZOILA:   PT: 105, index of 0.56, previously 0.66.  DP: 104, index of 0.56, previously 0.64.     Left ZOILA:   PT: 148, index of 0.8, previously 0.98.  DP: 154, index of 0.83, previously 0.9.      Waveforms: Distal waveforms monophasic in the right posterior tibial  artery and biphasic in the right dorsalis pedis artery. The left  posterior tibial and dorsalis pedis arteries are triphasic/biphasic.     Exercise: The patient was exercised on a treadmill at 1.0 MPH at a 10%  incline for 4 minutes total. Mild fatigue noted in the right leg at 3  minutes..     Right exercise ZOILA: 0.62, previously 0.25.  Left exercise ZOILA: 0.8, previously 0.72.                                                                       IMPRESSION:   1. Moderate arterial insufficiency in the right lower extremity,  resting ZOILA slightly diminished from previous exam, though  postexercise value improved from previous exam.  2. Mild arterial insufficiency in the left lower extremity, relatively  comparable.     ZOILA CRITERIA:  >1.4 NC  0.95-1.4 Normal  0.90 - 0.94 Mild  0.5 - 0.89 Moderate  0.2 - 0.49 Severe  <0.2 Critical     ERNESTINA ELI MD     Component      Latest Ref Rng 11/30/2023  9:42 AM   Total Cholesterol      <=199 mg/dL 145    Triglycerides      30 - 149 mg/dL 81    HDL Cholesterol      40 - 59 mg/dL 74 (H)    LDL Cholesterol      <=129 mg/dL 55    HDL Size      >=8.9 nm 9.7    VLDL Size      <=46.7 nm 48.5 (H)    LDL Particle Size      >=20.7 nm 21.3    Lge HDL Particle number      >=4.2 umol/L 11.1    HDL Particle Number NMR      >=33.0 umol/L 38.0    Lge VLDL Part number      <=2.7 nmol/L <1.5    Small LDL Particle number      <=634 nmol/L <165    LDL Particle Number      <=1135 nmol/L 574    EER LipoFit by NMR See Note    C-Reactive Protein High Sensitivity 1.72    Lipoprotein (a)      <30 mg/dL 43 (H)    Hemoglobin A1C      0.0 - 5.6 % 5.7 (H)       Legend:  (H) High                     A/P:           (I73.9) Claudication (H)  Comment: not lifestyle limiting. Advised continued walking. Do not clip toenails agressively. She does not want to be on more meds and as such Pletal was not offered to her. Stress ABIs of today are marginally better than last year.  Obtain below in one year. Present to clinic sooner if claudication worsen, or CLI sxs develop.  Plan: US ZOILA Doppler with Exercise Bilateral            (E78.5) Hyperlipidemia LDL goal <70  Comment: At LDL goal  on a statin  Plan: check lipid panel in one year. Consider use of pelacarsen for Lp(a) elevation when it becomes avialable on the market            (R73.01) IFG (impaired fasting glucose)  Comment: avoid  CHO, at goal  Plan: Hemoglobin A1c in one year            (I10) Essential hypertension  Comment:at goal at home, SBP averages /. Here today, BP was 171/139  Plan: Add metoprolol 12.5 mg PO BID to losartan 50 mg daily. RTC three weeks for a BP check, bring home BP log book in.     35 minutes total medical care on today's date.

## 2023-12-27 ENCOUNTER — PATIENT OUTREACH (OUTPATIENT)
Dept: CARE COORDINATION | Facility: CLINIC | Age: 78
End: 2023-12-27
Payer: COMMERCIAL

## 2024-01-02 ENCOUNTER — TRANSFERRED RECORDS (OUTPATIENT)
Dept: HEALTH INFORMATION MANAGEMENT | Facility: CLINIC | Age: 79
End: 2024-01-02
Payer: COMMERCIAL

## 2024-01-02 NOTE — LETTER
January 5, 2024      Ramya Hillman  [address]  [place] [address]        Dear MsKileyHillman,    We are writing to inform you of your test results.    Hi Ramya,     I'm covering Mustapha today.     Your mammogram was normal.  Please repeat in one year.         If you have any questions or concerns, please call the clinic at the number listed above.       Thanks,       Aaliyah Mcneal PA-C

## 2024-01-04 ENCOUNTER — PATIENT OUTREACH (OUTPATIENT)
Dept: CARE COORDINATION | Facility: CLINIC | Age: 79
End: 2024-01-04
Payer: COMMERCIAL

## 2024-01-04 NOTE — PROGRESS NOTES
Ramya Hillman is a 78 year old female who is presenting at the current time to discuss her diagnosi(es) of         Claudication (H)  Hyperlipidemia LDL goal <70  IFG (impaired fasting glucose)  Essential hypertension  Gastroesophageal reflux disease without esophagitis           HPI: Ramya Hillman is a 78 year old female who was seen today regarding bilateral lower leg claudication.  She is a former smoker and who quit smoking all together in 8/2019 after her appointment with Dr. Salinas, who has since moved. When seen on 9/17/2020, the patient could walk about 4 blocks/15 minutes before she would get cramping calf pain that resolved with short rest. That distance was very repeatable. She had improved since 2021 when she could only walk for 2 blocks and four minutes.  She denied chest pain and no stroke or TIA symptoms. Currently, she does not feel her lifestyle is severely limited by her symptoms, and she is now walking regularly. She has no CLI sxs or tissue loss.      On a statin, her LDL <70, LDL-P<1000, Lp(a) moderately elevated at 45. BP today is 154/77. Her BP at home runs 120/70. Her resting ZOILA is  0.56 (post exercise 0.62, previously 0.25)on the right, previously 0.58 with monophasic waveforms;Her left ZOILA is 0.98, previously 0.90 (post exercise 0.80, previously 0.72), with triphasic waveforms. She can currently walk 6 blocks before she develops pain in the RLE. She will stop to rest for 1-2 minutes and her sxs resolve to the point that she can walk again without limitation.This does not appreciably impact her ADLs. She has no nocturnal ischemic rest pain or nonhealing ulcers.     When seen in 4/2022, her BP was elevated. Med changes were not made at that time. She was advised to RTC with a month's worth of BP readings from home. In 05/22, it was noted her SBP averaged 119 at home. Now her SBP averages 120.        Review Of Systems  Skin: negative  Eyes: negative  Ears/Nose/Throat: on losartan she notes  throat tickling since starting metorpolol  Respiratory: No shortness of breath, dyspnea on exertion, cough, or hemoptysis  Cardiovascular: sensation of right leg discomfort  Gastrointestinal: negative  Genitourinary: negative  Musculoskeletal: as above, negative sensation of right leg discomfort  Neurologic: negative  Psychiatric: negative  Hematologic/Lymphatic/Immunologic: negative  Endocrine: negative     PAST MEDICAL HISTORY:                  Past Medical History           Past Medical History:   Diagnosis Date    Arthritis 1/01/2000     Hands & feet    Chest pain      Chronic ischemic heart disease      Condyloma       had for5 years, 20 years ago,recurrence 2012 biopsy taken    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)      Emphysema lung (H) 1/6/2022    Family history of heart disease      Hyperlipidemia      PVD (peripheral vascular disease) (H)      Tobacco abuse              PAST SURGICAL HISTORY:                  Past Surgical History             Past Surgical History:   Procedure Laterality Date    BIOPSY   3/1/2018     Mole on back    BUNIONECTOMY Right 8/26/2019     Procedure: BUNIONECTOMY RIGHT FOOT;  Surgeon: Rajni Sanchez MD;  Location: [facility] OR    COLONOSCOPY   10/18/2018     1 polyp - all clear    NO HISTORY OF SURGERY        ORTHOPEDIC SURGERY   1/1/2012     Rotator Cuff - Shoulder            CURRENT MEDICATIONS:                  Current Outpatient Prescriptions               Current Outpatient Medications   Medication Sig Dispense Refill    aspirin 81 MG tablet Take by mouth daily 1 tablet      Cholecalciferol (VITAMIN D) 1000 UNITS capsule Take 1 capsule by mouth daily        Coenzyme Q10 (CO Q10) 100 MG CAPS Take by mouth daily        losartan (COZAAR) 50 MG tablet Take 1 tablet (50 mg) by mouth daily 30 tablet 3    multivitamin w/minerals (THERA-VIT-M) tablet Take 1 tablet by mouth daily        Omega-3 Fatty Acids (FISH OIL)  1000 MG CPDR Take by mouth daily        rosuvastatin (CRESTOR) 40 MG tablet Take 1 tablet (40 mg) by mouth daily 90 tablet 2            ALLERGIES:                          Allergies   Allergen Reactions    No Known Allergies           SOCIAL HISTORY:                  Social History   Social History                Socioeconomic History    Marital status:        Spouse name: Not on file    Number of children: 0    Years of education: Not on file    Highest education level: Not on file   Occupational History    Occupation: Sales   Tobacco Use    Smoking status: Former Smoker       Packs/day: 0.00       Years: 30.00       Pack years: 0.00       Types: Cigarettes       Start date: 1/1/1990       Quit date: 8/1/2018       Years since quitting: 3.8    Smokeless tobacco: Never Used    Tobacco comment:  2-3 cigs per day off and on   Substance and Sexual Activity    Alcohol use: Yes       Alcohol/week: 0.0 standard drinks       Comment: Very minimal    Drug use: No    Sexual activity: Not Currently       Birth control/protection: Post-menopausal       Comment: postmenopausal   Other Topics Concern     Service Not Asked    Blood Transfusions Not Asked    Caffeine Concern No       Comment: 1 a day. mostly decaf    Occupational Exposure Not Asked    Hobby Hazards Not Asked    Sleep Concern Not Asked    Stress Concern Not Asked    Weight Concern Not Asked    Special Diet Not Asked    Back Care Not Asked    Exercise No       Comment: likes to walk    Bike Helmet Not Asked    Seat Belt Not Asked    Self-Exams Not Asked    Parent/sibling w/ CABG, MI or angioplasty before 65F 55M? Yes   Social History Narrative    Not on file      Social Determinants of Health      Financial Resource Strain: Not on file   Food Insecurity: Not on file   Transportation Needs: Not on file   Physical Activity: Not on file   Stress: Not on file   Social Connections: Not on file   Intimate Partner Violence: Not on file   Housing Stability:  Not on file            FAMILY HISTORY:                   Family History             Family History   Problem Relation Age of Onset    Cerebrovascular Disease Mother      Coronary Artery Disease Mother               Osteoporosis Mother      Hyperlipidemia Mother      Coronary Artery Disease Father 81             Hyperlipidemia Father      Coronary Artery Disease Brother               Hyperlipidemia Brother      Coronary Artery Disease Sister 52             Hyperlipidemia Sister      Coronary Artery Disease Brother 62    Hyperlipidemia Brother      Coronary Artery Disease Sister      Hyperlipidemia Sister      Coronary Artery Disease Sister      Hyperlipidemia Sister      Breast Cancer Sister 80             Breast Cancer Niece      Coronary Artery Disease Sister      Breast Cancer Sister      Coronary Artery Disease Sister                 Component      Latest Ref Rng 2023  9:10 AM   Cholesterol      <200 mg/dL 130    Triglycerides      <150 mg/dL 77    HDL Cholesterol      >=50 mg/dL 67    LDL Cholesterol Calculated      <=100 mg/dL 48    Non HDL Cholesterol      <130 mg/dL 63    Hemoglobin A1C      0.0 - 5.6 % 5.9 (H)       Legend:  (H) High      ULTRASOUND ZOILA DOPPLER WITH EXERCISE BILATERAL 2023 10:23 AM        HISTORY: Claudication (H).     COMPARISON: 2019.     FINDINGS:  Right ZOILA:   PT: 105, index of 0.56, previously 0.66.  DP: 104, index of 0.56, previously 0.64.     Left ZOILA:   PT: 148, index of 0.8, previously 0.98.  DP: 154, index of 0.83, previously 0.9.      Waveforms: Distal waveforms monophasic in the right posterior tibial  artery and biphasic in the right dorsalis pedis artery. The left  posterior tibial and dorsalis pedis arteries are triphasic/biphasic.     Exercise: The patient was exercised on a treadmill at 1.0 MPH at a 10%  incline for 4 minutes total. Mild fatigue noted in the right leg at 3  minutes..     Right exercise ZOILA:  0.62, previously 0.25.  Left exercise ZOILA: 0.8, previously 0.72.                                                                      IMPRESSION:   1. Moderate arterial insufficiency in the right lower extremity,  resting ZOILA slightly diminished from previous exam, though  postexercise value improved from previous exam.  2. Mild arterial insufficiency in the left lower extremity, relatively  comparable.     ZOILA CRITERIA:  >1.4 NC  0.95-1.4 Normal  0.90 - 0.94 Mild  0.5 - 0.89 Moderate  0.2 - 0.49 Severe  <0.2 Critical     ERNESTINA ELI MD      Component      Latest Ref Rng 11/30/2023  9:42 AM   Total Cholesterol      <=199 mg/dL 145    Triglycerides      30 - 149 mg/dL 81    HDL Cholesterol      40 - 59 mg/dL 74 (H)    LDL Cholesterol      <=129 mg/dL 55    HDL Size      >=8.9 nm 9.7    VLDL Size      <=46.7 nm 48.5 (H)    LDL Particle Size      >=20.7 nm 21.3    Lge HDL Particle number      >=4.2 umol/L 11.1    HDL Particle Number NMR      >=33.0 umol/L 38.0    Lge VLDL Part number      <=2.7 nmol/L <1.5    Small LDL Particle number      <=634 nmol/L <165    LDL Particle Number      <=1135 nmol/L 574    EER LipoFit by NMR See Note    C-Reactive Protein High Sensitivity 1.72    Lipoprotein (a)      <30 mg/dL 43 (H)    Hemoglobin A1C      0.0 - 5.6 % 5.7 (H)       Legend:  (H) High                           A/P:           (I73.9) Claudication (H)  Comment: Not lifestyle limiting. Advised continued walking. Do not clip toenails agressively. She does not want to be on more meds and as such Pletal and Xarelto were not offered to her. Stress ABIs of 12/14/23 are marginally better than last year.  Obtain below in 12/2024. Present to clinic sooner if claudication worsens, or CLI sxs develop.  Plan: US ZOILA Doppler with Exercise Bilateral            (E78.5) Hyperlipidemia LDL goal <70  Comment: At LDL goal  on a statin  Plan: Check lipid panel in 12/2024. Consider use of pelacarsen for Lp(a) elevation when it becomes  avialable on the market            (R73.01) IFG (impaired fasting glucose)  Comment: avoid CHO, at goal  Plan: Hemoglobin A1c in in 12/2024            (I10) Essential hypertension  Comment: At home, SBP averaged /. Here on 12/13/23, BP was 171/139 so we added metoprolol 12.5 mg PO BID to losartan 50 mg daily. RTC today for a BP check, home BP averages 120/70  Plan: Continue metoprolol 12.5 mg PO BID, continue losartan 50 mg daily. RTC 12/2024 for a BP check, bring home BP log book in. If throat hoarseness with addition of metoprolol becomes more bothersome,. RTC to discuss other BP med options , which could include amlodipine, Imdur, or hydralazine.      35 minutes total medical care on today's date.

## 2024-01-05 ENCOUNTER — OFFICE VISIT (OUTPATIENT)
Dept: OTHER | Facility: CLINIC | Age: 79
End: 2024-01-05
Attending: INTERNAL MEDICINE
Payer: COMMERCIAL

## 2024-01-05 VITALS
WEIGHT: 169.8 LBS | DIASTOLIC BLOOD PRESSURE: 77 MMHG | HEIGHT: 70 IN | BODY MASS INDEX: 24.31 KG/M2 | HEART RATE: 80 BPM | SYSTOLIC BLOOD PRESSURE: 154 MMHG | OXYGEN SATURATION: 98 %

## 2024-01-05 DIAGNOSIS — E78.5 HYPERLIPIDEMIA LDL GOAL <70: ICD-10-CM

## 2024-01-05 DIAGNOSIS — I73.9 CLAUDICATION (H): ICD-10-CM

## 2024-01-05 DIAGNOSIS — I10 ESSENTIAL HYPERTENSION: Primary | ICD-10-CM

## 2024-01-05 DIAGNOSIS — R73.01 IFG (IMPAIRED FASTING GLUCOSE): ICD-10-CM

## 2024-01-05 PROCEDURE — 99214 OFFICE O/P EST MOD 30 MIN: CPT | Performed by: INTERNAL MEDICINE

## 2024-01-05 PROCEDURE — G0463 HOSPITAL OUTPT CLINIC VISIT: HCPCS | Performed by: INTERNAL MEDICINE

## 2024-01-18 ENCOUNTER — PATIENT OUTREACH (OUTPATIENT)
Dept: CARE COORDINATION | Facility: CLINIC | Age: 79
End: 2024-01-18
Payer: COMMERCIAL

## 2024-01-22 ASSESSMENT — ENCOUNTER SYMPTOMS
HEARTBURN: 1
BREAST MASS: 0

## 2024-01-22 ASSESSMENT — ACTIVITIES OF DAILY LIVING (ADL): CURRENT_FUNCTION: NO ASSISTANCE NEEDED

## 2024-01-23 ENCOUNTER — OFFICE VISIT (OUTPATIENT)
Dept: FAMILY MEDICINE | Facility: CLINIC | Age: 79
End: 2024-01-23
Payer: COMMERCIAL

## 2024-01-23 VITALS
WEIGHT: 169 LBS | OXYGEN SATURATION: 99 % | SYSTOLIC BLOOD PRESSURE: 125 MMHG | HEART RATE: 85 BPM | RESPIRATION RATE: 16 BRPM | TEMPERATURE: 97.7 F | HEIGHT: 70 IN | BODY MASS INDEX: 24.2 KG/M2 | DIASTOLIC BLOOD PRESSURE: 75 MMHG

## 2024-01-23 DIAGNOSIS — H61.23 BILATERAL IMPACTED CERUMEN: ICD-10-CM

## 2024-01-23 DIAGNOSIS — I10 ESSENTIAL HYPERTENSION: ICD-10-CM

## 2024-01-23 DIAGNOSIS — M81.0 OSTEOPOROSIS, UNSPECIFIED OSTEOPOROSIS TYPE, UNSPECIFIED PATHOLOGICAL FRACTURE PRESENCE: ICD-10-CM

## 2024-01-23 DIAGNOSIS — R73.03 PREDIABETES: ICD-10-CM

## 2024-01-23 DIAGNOSIS — S22.088A OTHER FRACTURE OF T11-T12 VERTEBRA, INITIAL ENCOUNTER FOR CLOSED FRACTURE (H): ICD-10-CM

## 2024-01-23 DIAGNOSIS — Z00.00 ENCOUNTER FOR ANNUAL WELLNESS EXAM IN MEDICARE PATIENT: Primary | ICD-10-CM

## 2024-01-23 DIAGNOSIS — E78.5 HYPERLIPIDEMIA LDL GOAL <100: ICD-10-CM

## 2024-01-23 DIAGNOSIS — F17.200 TOBACCO USE DISORDER: ICD-10-CM

## 2024-01-23 DIAGNOSIS — Z87.891 FORMER SMOKER: ICD-10-CM

## 2024-01-23 LAB
ALBUMIN SERPL BCG-MCNC: 4.3 G/DL (ref 3.5–5.2)
ALP SERPL-CCNC: 43 U/L (ref 40–150)
ALT SERPL W P-5'-P-CCNC: 23 U/L (ref 0–50)
ANION GAP SERPL CALCULATED.3IONS-SCNC: 9 MMOL/L (ref 7–15)
AST SERPL W P-5'-P-CCNC: 34 U/L (ref 0–45)
BASOPHILS # BLD AUTO: 0 10E3/UL (ref 0–0.2)
BASOPHILS NFR BLD AUTO: 0 %
BILIRUB SERPL-MCNC: 0.4 MG/DL
BUN SERPL-MCNC: 27.3 MG/DL (ref 8–23)
CALCIUM SERPL-MCNC: 9.1 MG/DL (ref 8.8–10.2)
CHLORIDE SERPL-SCNC: 107 MMOL/L (ref 98–107)
CREAT SERPL-MCNC: 1.03 MG/DL (ref 0.51–0.95)
DEPRECATED HCO3 PLAS-SCNC: 23 MMOL/L (ref 22–29)
EGFRCR SERPLBLD CKD-EPI 2021: 56 ML/MIN/1.73M2
EOSINOPHIL # BLD AUTO: 0.3 10E3/UL (ref 0–0.7)
EOSINOPHIL NFR BLD AUTO: 2 %
ERYTHROCYTE [DISTWIDTH] IN BLOOD BY AUTOMATED COUNT: 13.5 % (ref 10–15)
GLUCOSE SERPL-MCNC: 98 MG/DL (ref 70–99)
HCT VFR BLD AUTO: 42.6 % (ref 35–47)
HGB BLD-MCNC: 13.7 G/DL (ref 11.7–15.7)
IMM GRANULOCYTES # BLD: 0 10E3/UL
IMM GRANULOCYTES NFR BLD: 0 %
LYMPHOCYTES # BLD AUTO: 1.8 10E3/UL (ref 0.8–5.3)
LYMPHOCYTES NFR BLD AUTO: 18 %
MCH RBC QN AUTO: 30.6 PG (ref 26.5–33)
MCHC RBC AUTO-ENTMCNC: 32.2 G/DL (ref 31.5–36.5)
MCV RBC AUTO: 95 FL (ref 78–100)
MONOCYTES # BLD AUTO: 0.8 10E3/UL (ref 0–1.3)
MONOCYTES NFR BLD AUTO: 7 %
NEUTROPHILS # BLD AUTO: 7.3 10E3/UL (ref 1.6–8.3)
NEUTROPHILS NFR BLD AUTO: 72 %
PLATELET # BLD AUTO: 257 10E3/UL (ref 150–450)
POTASSIUM SERPL-SCNC: 4.5 MMOL/L (ref 3.4–5.3)
PROT SERPL-MCNC: 7.1 G/DL (ref 6.4–8.3)
RBC # BLD AUTO: 4.47 10E6/UL (ref 3.8–5.2)
SODIUM SERPL-SCNC: 139 MMOL/L (ref 135–145)
TSH SERPL DL<=0.005 MIU/L-ACNC: 2.13 UIU/ML (ref 0.3–4.2)
WBC # BLD AUTO: 10.2 10E3/UL (ref 4–11)

## 2024-01-23 PROCEDURE — 84443 ASSAY THYROID STIM HORMONE: CPT | Performed by: PHYSICIAN ASSISTANT

## 2024-01-23 PROCEDURE — 85025 COMPLETE CBC W/AUTO DIFF WBC: CPT | Performed by: PHYSICIAN ASSISTANT

## 2024-01-23 PROCEDURE — 80053 COMPREHEN METABOLIC PANEL: CPT | Performed by: PHYSICIAN ASSISTANT

## 2024-01-23 PROCEDURE — 36415 COLL VENOUS BLD VENIPUNCTURE: CPT | Performed by: PHYSICIAN ASSISTANT

## 2024-01-23 PROCEDURE — G0439 PPPS, SUBSEQ VISIT: HCPCS | Performed by: PHYSICIAN ASSISTANT

## 2024-01-23 PROCEDURE — 99214 OFFICE O/P EST MOD 30 MIN: CPT | Mod: 25 | Performed by: PHYSICIAN ASSISTANT

## 2024-01-23 PROCEDURE — 69209 REMOVE IMPACTED EAR WAX UNI: CPT | Mod: 50 | Performed by: PHYSICIAN ASSISTANT

## 2024-01-23 RX ORDER — ALENDRONATE SODIUM 70 MG/1
70 TABLET ORAL
Qty: 12 TABLET | Refills: 1 | Status: SHIPPED | OUTPATIENT
Start: 2024-01-23

## 2024-01-23 ASSESSMENT — PAIN SCALES - GENERAL: PAINLEVEL: NO PAIN (0)

## 2024-01-23 ASSESSMENT — ACTIVITIES OF DAILY LIVING (ADL): CURRENT_FUNCTION: NO ASSISTANCE NEEDED

## 2024-01-23 NOTE — PROGRESS NOTES
"Preventive Care Visit  [facility] RALPH Gonsalez PA-C, Physician Assistant - Medical  Jan 23, 2024      SUBJECTIVE:   Ramya is a very pleasant 78 year old, presenting for the following:  Physical    Here today for her annual wellness visit.  Continues to follow-up with vascular medicine, Dr. Wolf claudication/hyperlipidemia/HTN.  Staying active with walking.  Lives independently in [facility] in a condo.  No longer working.    -Tolerating Fosamax well.  Has been on for 1 full year.  Plan to repeat DEXA scan December 2024.    -Due for lung cancer screening.    -Did have COVID-19 in October.  Mild infection.  Did take Paxlovid.  Recovered well.  Due for repeat additional COVID-19 immunization as well as RSV.    -Still notes occasional episodes of shortness of breath which are temporary.  Perhaps once weekly.  Fleeting approximately 20 to 30 minutes.  No additional associated symptoms.  Plans to keep a log identify these further.  Stress test, EKG, PFTs unremarkable.    -Completed colonoscopy in October.  No polyps.  Done with colon cancer screening.    Are you in the first 12 months of your Medicare coverage?  No    Healthy Habits:     In general, how would you rate your overall health?  Good    Frequency of exercise:  2-3 days/week    Duration of exercise:  15-30 minutes    Do you usually eat at least 4 servings of fruit and vegetables a day, include whole grains    & fiber and avoid regularly eating high fat or \"junk\" foods?  Yes    Taking medications regularly:  Yes    Medication side effects:  None    Ability to successfully perform activities of daily living:  No assistance needed    Home Safety:  No safety concerns identified    Hearing Impairment:  No hearing concerns    In the past 6 months, have you been bothered by leaking of urine?  No    In general, how would you rate your overall mental or emotional health?  Good    Additional concerns today:  Yes      Today's PHQ-2 Score: "       2024     9:34 PM   PHQ-2 (  [employer])   Q1: Little interest or pleasure in doing things 0   Q2: Feeling down, depressed or hopeless 0   PHQ-2 Score 0   Q1: Little interest or pleasure in doing things Not at all   Q2: Feeling down, depressed or hopeless Not at all   PHQ-2 Score 0           Have you ever done Advance Care Planning? (For example, a Health Directive, POLST, or a discussion with a medical provider or your loved ones about your wishes): No, advance care planning information given to patient to review.  Patient plans to discuss their wishes with loved ones or provider.         Fall risk  Fallen 2 or more times in the past year?: No  Any fall with injury in the past year?: No    Cognitive Screening   1) Repeat 3 items (Leader, Season, Table)    2) Clock draw: NORMAL  3) 3 item recall: Recalls 3 objects  Results: 3 items recalled: COGNITIVE IMPAIRMENT LESS LIKELY    Mini-CogTM Copyright GERA King. Licensed by the author for use in [facility]; reprinted with permission (cameron@[facility]). All rights reserved.      Do you have sleep apnea, excessive snoring or daytime drowsiness? : no    Reviewed and updated as needed this visit by clinical staff   Tobacco  Allergies  Meds   Med Hx  Surg Hx           Reviewed and updated as needed this visit by Provider   Tobacco   Meds   Med Hx  Surg Hx           Social History     Tobacco Use    Smoking status: Former     Packs/day: 0.00     Years: 30.00     Additional pack years: 0.00     Total pack years: 0.00     Types: Cigarettes     Start date: 1990     Quit date: 2018     Years since quittin.4    Smokeless tobacco: Never    Tobacco comments:      2-3 cigs per day off and on   Substance Use Topics    Alcohol use: Yes     Comment: Very minimal             2024     9:34 PM   Alcohol Use   Prescreen: >3 drinks/day or >7 drinks/week? No       Do you have a current opioid prescription? No  Do you use any other controlled  substances or medications that are not prescribed by a provider? None        Current providers sharing in care for this patient include:Patient Care Team:  Tejinder Gonsalez PA-C as PCP - General (Physician Assistant - Medical)  Dominik Wolf MD as Assigned Heart and Vascular Provider  Tejinder Gonsalez PA-C as Assigned PCP    The following health maintenance items are reviewed in Epic and correct as of today:  Health Maintenance   Topic Date Due    RSV VACCINE (Pregnancy & 60+) (1 - 1-dose 60+ series) Never done    COVID-19 Vaccine (8 - 2023-24 season) 09/01/2023    MEDICARE ANNUAL WELLNESS VISIT  01/17/2024    LUNG CANCER SCREENING  01/26/2024    MAMMO SCREENING  01/02/2025    ANNUAL REVIEW OF HM ORDERS  01/23/2025    FALL RISK ASSESSMENT  01/23/2025    DTAP/TDAP/TD IMMUNIZATION (2 - Td or Tdap) 11/02/2027    LIPID  04/18/2028    ADVANCE CARE PLANNING  01/23/2029    DEXA  12/19/2037    SPIROMETRY  Completed    HEPATITIS C SCREENING  Completed    PHQ-2 (once per calendar year)  Completed    INFLUENZA VACCINE  Completed    Pneumococcal Vaccine: 65+ Years  Completed    ZOSTER IMMUNIZATION  Completed    COPD ACTION PLAN  Addressed    IPV IMMUNIZATION  Aged Out    HPV IMMUNIZATION  Aged Out    MENINGITIS IMMUNIZATION  Aged Out    RSV MONOCLONAL ANTIBODY  Aged Out    COLORECTAL CANCER SCREENING  Discontinued     Lab work is in process  Labs reviewed in EPIC  BP Readings from Last 3 Encounters:   01/23/24 125/75   01/05/24 (!) 154/77   12/14/23 (!) 171/139    Wt Readings from Last 3 Encounters:   01/23/24 76.7 kg (169 lb)   01/05/24 77 kg (169 lb 12.8 oz)   12/14/23 74.4 kg (164 lb)                  Patient Active Problem List   Diagnosis    PVD (peripheral vascular disease) (H24)    Chest pain    Hyperlipidemia LDL goal <100    Painful tongue    Chronic ischemic heart disease    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)    PAD (peripheral  artery disease) (H24)    Claudication (H24)    Gastroesophageal reflux disease with esophagitis without hemorrhage    Former smoker    Skin lesions, generalized    Emphysema lung (H)    Tobacco use disorder    Rotator cuff syndrome    History of dysplastic nevus    History of basal cell carcinoma    Osteoporosis, unspecified osteoporosis type, unspecified pathological fracture presence    Essential hypertension     Past Surgical History:   Procedure Laterality Date    BIOPSY  3/1/2018    Mole on back    BUNIONECTOMY Right 2019    Procedure: BUNIONECTOMY RIGHT FOOT;  Surgeon: Rajni Sanchez MD;  Location: [facility] OR    COLONOSCOPY  10/18/2018    1 polyp - all clear    NO HISTORY OF SURGERY      ORTHOPEDIC SURGERY  2012    Rotator Cuff - Shoulder       Social History     Tobacco Use    Smoking status: Former     Packs/day: 0.00     Years: 30.00     Additional pack years: 0.00     Total pack years: 0.00     Types: Cigarettes     Start date: 1990     Quit date: 2018     Years since quittin.4    Smokeless tobacco: Never    Tobacco comments:      2-3 cigs per day off and on   Substance Use Topics    Alcohol use: Yes     Comment: Very minimal     Family History   Problem Relation Age of Onset    Cerebrovascular Disease Mother     Coronary Artery Disease Mother             Osteoporosis Mother     Hyperlipidemia Mother     Coronary Artery Disease Father 81            Hyperlipidemia Father     Coronary Artery Disease Brother             Hyperlipidemia Brother     Coronary Artery Disease Sister 52            Hyperlipidemia Sister     Coronary Artery Disease Brother 62    Hyperlipidemia Brother     Coronary Artery Disease Sister     Hyperlipidemia Sister     Coronary Artery Disease Sister             Hyperlipidemia Sister     Breast Cancer Sister 80            Breast Cancer Niece     Coronary Artery Disease Sister     Breast Cancer Sister     Coronary Artery  Disease Sister          Current Outpatient Medications   Medication Sig Dispense Refill    albuterol (PROAIR HFA/PROVENTIL HFA/VENTOLIN HFA) 108 (90 Base) MCG/ACT inhaler Inhale 2 puffs into the lungs every 6 hours as needed for shortness of breath 18 g 1    alendronate (FOSAMAX) 70 MG tablet Take 1 tablet (70 mg) by mouth every 7 days 12 tablet 1    aspirin 81 MG tablet Take by mouth daily 1 tablet     Cholecalciferol (VITAMIN D) 1000 UNITS capsule Take 1 capsule by mouth daily      Coenzyme Q10 (CO Q10) 100 MG CAPS Take by mouth daily      losartan (COZAAR) 50 MG tablet Take 1 tablet (50 mg) by mouth daily 90 tablet 3    metoprolol tartrate (LOPRESSOR) 25 MG tablet TAKE 1/2 TABLET(12.5 MG) BY MOUTH TWICE DAILY 90 tablet 0    multivitamin w/minerals (THERA-VIT-M) tablet Take 1 tablet by mouth daily      Omega-3 Fatty Acids (FISH OIL) 1000 MG CPDR Take by mouth daily      omeprazole (PRILOSEC) 40 MG DR capsule Take 1 capsule (40 mg) by mouth daily 90 capsule 1    rosuvastatin (CRESTOR) 40 MG tablet Take 1 tablet (40 mg) by mouth daily 90 tablet 3     Allergies   Allergen Reactions    No Known Allergies      Recent Labs   Lab Test 11/30/23  0942 06/14/23  1201 04/18/23  0910 01/17/23  1105 04/06/22  1015 04/10/21  1244 03/22/21  0800 12/17/20  0843 09/01/20  0705 12/10/19  1020 08/19/19  1019 11/21/18  0901   A1C 5.7*  --  5.9*  --  5.7*  --    < > 5.9*   < >  --   --   --    LDL  --   --  48  --   --   --   --   --   --  85  --  68   HDL  --   --  67  --   --   --   --   --   --  64  --  55   TRIG  --   --  77  --   --   --   --   --   --  94  --  169*   ALT  --  28  --  16  --  32  --  29  --  23  --  22   CR  --  0.92  --  0.99*  --  0.82  --  1.06*   < > 0.76   < > 0.78   GFRESTIMATED  --  64  --  59*  --  70  --  52*   < > 77   < > 73   GFRESTBLACK  --   --   --   --   --  82  --  60*   < > 90   < > 88   POTASSIUM  --  4.4  --  4.6  --  4.9  --  4.3   < > 3.9   < > 4.0   TSH  --  1.65  --   --  1.86  --   --   "3.33  --  2.14  --  2.53    < > = values in this interval not displayed.      Pertinent mammograms are reviewed under the imaging tab.  Review of Systems   Genitourinary:  Negative for pelvic pain, vaginal bleeding and vaginal discharge.        Review of Systems  Constitutional, neuro, ENT, endocrine, pulmonary, cardiac, gastrointestinal, genitourinary, musculoskeletal, integument and psychiatric systems are negative, except as otherwise noted.    OBJECTIVE:   /75   Pulse 85   Temp 97.7  F (36.5  C) (Temporal)   Resp 16   Ht 1.778 m (5' 10\")   Wt 76.7 kg (169 lb)   SpO2 99%   Breastfeeding No   BMI 24.25 kg/m     Estimated body mass index is 24.25 kg/m  as calculated from the following:    Height as of this encounter: 1.778 m (5' 10\").    Weight as of this encounter: 76.7 kg (169 lb).  Physical Exam  GENERAL: alert and no distress  EYES: Eyes grossly normal to inspection, PERRL and conjunctivae and sclerae normal  HENT: Bilateral cerumen impaction otherwise ear canals and TM's normal, nose and mouth without ulcers or lesions  NECK: no adenopathy, no asymmetry, masses, or scars  RESP: lungs clear to auscultation - no rales, rhonchi or wheezes  CV: regular rate and rhythm, normal S1 S2, no S3 or S4, no murmur, click or rub, no peripheral edema  ABDOMEN: soft, nontender, no hepatosplenomegaly, no masses and bowel sounds normal  MS: no gross musculoskeletal defects noted, no edema  SKIN: no suspicious lesions or rashes  NEURO: Normal strength and tone, mentation intact and speech normal  PSYCH: mentation appears normal, affect normal/bright  LYMPH: no cervical, supraclavicular, axillary, or inguinal adenopathy    ASSESSMENT / PLAN:   Encounter for annual wellness exam in Medicare patient  Annual wellness labs today.  RSV/COVID shot at the pharmacy. Recommended routine dental, eye, and skin screenings.  Sees all 3.  Healthy diet and exercise.    - CBC with platelets and differential  - Comprehensive " metabolic panel (BMP + Alb, Alk Phos, ALT, AST, Total. Bili, TP)  - TSH with free T4 reflex    Essential hypertension  Well-controlled.  Continue at home monitoring.  Labs today.  - CBC with platelets and differential  - Comprehensive metabolic panel (BMP + Alb, Alk Phos, ALT, AST, Total. Bili, TP)  - TSH with free T4 reflex    Hyperlipidemia LDL goal <100  Continue statin.    Tobacco use disorder  Former smoker  CT chest for lung cancer screening ordered.  - CT Chest Lung Cancer Scrn Low Dose wo    Osteoporosis, unspecified osteoporosis type, unspecified pathological fracture presence  Other fracture of t11-T12 vertebra, initial encounter for closed fracture (H)  Continue Fosamax.  DEXA scan end of 2024.  Weightbearing exercise.  Doing physical therapy  - alendronate (FOSAMAX) 70 MG tablet  Dispense: 12 tablet; Refill: 1      Bilateral impacted cerumen  Bilateral cerumen impaction removed today via ear lavage.  Tolerated procedure well.  Follow-up as needed for this concern.  - REMOVE IMPACTED CERUMEN    Prediabetes  Most recent A1c 5.7%.  Will continue to monitor.  Diet reviewed.  Patient has been advised of split billing requirements and indicates understanding: Yes      Counseling  Reviewed preventive health counseling, as reflected in patient instructions       Regular exercise       Healthy diet/nutrition       Vision screening       Dental care       Fall risk prevention        She reports that she quit smoking about 5 years ago. Her smoking use included cigarettes. She started smoking about 34 years ago. She has never used smokeless tobacco.      Appropriate preventive services were discussed with this patient, including applicable screening as appropriate for fall prevention, nutrition, physical activity, Tobacco-use cessation, weight loss and cognition.  Checklist reviewing preventive services available has been given to the patient.    Reviewed patients plan of care and provided an AVS. The Basic Care  Plan (routine screening as documented in Health Maintenance) for Ramya meets the Care Plan requirement. This Care Plan has been established and reviewed with the Patient.          Signed Electronically by: Tejinder Gonsalez PA-C    Identified Health Risks  Answers submitted by the patient for this visit:  Annual Preventive Visit (Submitted on 1/22/2024)  Chief Complaint: Annual Exam:  heartburn: Yes  tenderness: No  breast mass: No  breast discharge: No

## 2024-01-24 NOTE — RESULT ENCOUNTER NOTE
Rudy DAN,     -Stable blood counts. No signs of anemia.     -Your comprehensive metabolic panel which includes tests of liver function (ALT, AST, total bilirubin, alkaline phosphatase), kidney function (creatinine, BUN), electrolytes (sodium, potassium, calcium), and blood sugar (glucose) returned stable for you.    -TSH (thyroid stimulating hormone) level is normal which indicates normal circulating thyroid hormone levels.     Let me know if you have any questions or concerns,     Tejinder Gonsalez PA-C  [facility]

## 2024-02-01 ENCOUNTER — ANCILLARY PROCEDURE (OUTPATIENT)
Dept: CT IMAGING | Facility: CLINIC | Age: 79
End: 2024-02-01
Attending: PHYSICIAN ASSISTANT
Payer: COMMERCIAL

## 2024-02-01 DIAGNOSIS — F17.200 TOBACCO USE DISORDER: ICD-10-CM

## 2024-02-01 DIAGNOSIS — Z87.891 FORMER SMOKER: ICD-10-CM

## 2024-02-01 PROCEDURE — 71271 CT THORAX LUNG CANCER SCR C-: CPT

## 2024-02-01 NOTE — RESULT ENCOUNTER NOTE
Hi Ramya K,     CT lung cancer screening looks stable.  Few tiny stable pulmonary nodules.  Plan to repeat this in 1 year!     Let me know if you have any questions or concerns,     Tejinder Gonsalez PA-C  [facility]

## 2024-03-09 DIAGNOSIS — I10 ESSENTIAL HYPERTENSION: ICD-10-CM

## 2024-03-11 RX ORDER — METOPROLOL TARTRATE 25 MG/1
12.5 TABLET, FILM COATED ORAL 2 TIMES DAILY
Qty: 90 TABLET | Refills: 2 | Status: SHIPPED | OUTPATIENT
Start: 2024-03-11

## 2024-03-11 NOTE — TELEPHONE ENCOUNTER
metoprolol tartrate (LOPRESSOR) 25 MG tablet   Last Written Prescription Date:  12/14/23  Last Fill Quantity: 90,  # refills: 0    Last visit with provider:  1/5/24  Follow up recommended:  12/2024    Prescription approved per [facility] Refill Protocol.

## 2024-09-19 ENCOUNTER — TRANSFERRED RECORDS (OUTPATIENT)
Dept: HEALTH INFORMATION MANAGEMENT | Facility: CLINIC | Age: 79
End: 2024-09-19
Payer: COMMERCIAL

## 2024-10-03 ENCOUNTER — TRANSFERRED RECORDS (OUTPATIENT)
Dept: HEALTH INFORMATION MANAGEMENT | Facility: CLINIC | Age: 79
End: 2024-10-03
Payer: COMMERCIAL

## 2024-10-09 ENCOUNTER — OFFICE VISIT (OUTPATIENT)
Dept: FAMILY MEDICINE | Facility: CLINIC | Age: 79
End: 2024-10-09
Payer: COMMERCIAL

## 2024-10-09 VITALS
WEIGHT: 165.4 LBS | DIASTOLIC BLOOD PRESSURE: 70 MMHG | OXYGEN SATURATION: 98 % | HEART RATE: 95 BPM | HEIGHT: 70 IN | BODY MASS INDEX: 23.68 KG/M2 | RESPIRATION RATE: 16 BRPM | SYSTOLIC BLOOD PRESSURE: 107 MMHG | TEMPERATURE: 97.1 F

## 2024-10-09 DIAGNOSIS — H61.23 BILATERAL IMPACTED CERUMEN: ICD-10-CM

## 2024-10-09 DIAGNOSIS — I25.750 CORONARY ARTERY DISEASE INVOLVING NATIVE ARTERY OF TRANSPLANTED HEART WITH UNSTABLE ANGINA PECTORIS (H): ICD-10-CM

## 2024-10-09 DIAGNOSIS — M81.0 OSTEOPOROSIS, UNSPECIFIED OSTEOPOROSIS TYPE, UNSPECIFIED PATHOLOGICAL FRACTURE PRESENCE: ICD-10-CM

## 2024-10-09 DIAGNOSIS — R42 DIZZINESS: Primary | ICD-10-CM

## 2024-10-09 DIAGNOSIS — H90.6 MIXED CONDUCTIVE AND SENSORINEURAL HEARING LOSS OF BOTH EARS: ICD-10-CM

## 2024-10-09 DIAGNOSIS — I10 ESSENTIAL HYPERTENSION: ICD-10-CM

## 2024-10-09 DIAGNOSIS — R53.83 OTHER FATIGUE: ICD-10-CM

## 2024-10-09 DIAGNOSIS — J43.9 PULMONARY EMPHYSEMA, UNSPECIFIED EMPHYSEMA TYPE (H): ICD-10-CM

## 2024-10-09 LAB
ALBUMIN SERPL BCG-MCNC: 4.3 G/DL (ref 3.5–5.2)
ALP SERPL-CCNC: 48 U/L (ref 40–150)
ALT SERPL W P-5'-P-CCNC: 30 U/L (ref 0–50)
ANION GAP SERPL CALCULATED.3IONS-SCNC: 11 MMOL/L (ref 7–15)
AST SERPL W P-5'-P-CCNC: 35 U/L (ref 0–45)
BASOPHILS # BLD AUTO: 0 10E3/UL (ref 0–0.2)
BASOPHILS NFR BLD AUTO: 1 %
BILIRUB SERPL-MCNC: 0.4 MG/DL
BUN SERPL-MCNC: 26.9 MG/DL (ref 8–23)
CALCIUM SERPL-MCNC: 9.1 MG/DL (ref 8.8–10.4)
CHLORIDE SERPL-SCNC: 106 MMOL/L (ref 98–107)
CHOLEST SERPL-MCNC: 126 MG/DL
CREAT SERPL-MCNC: 1.09 MG/DL (ref 0.51–0.95)
EGFRCR SERPLBLD CKD-EPI 2021: 52 ML/MIN/1.73M2
EOSINOPHIL # BLD AUTO: 0.5 10E3/UL (ref 0–0.7)
EOSINOPHIL NFR BLD AUTO: 7 %
ERYTHROCYTE [DISTWIDTH] IN BLOOD BY AUTOMATED COUNT: 13.7 % (ref 10–15)
FASTING STATUS PATIENT QL REPORTED: YES
FASTING STATUS PATIENT QL REPORTED: YES
GLUCOSE SERPL-MCNC: 96 MG/DL (ref 70–99)
HCO3 SERPL-SCNC: 24 MMOL/L (ref 22–29)
HCT VFR BLD AUTO: 42.8 % (ref 35–47)
HDLC SERPL-MCNC: 64 MG/DL
HGB BLD-MCNC: 13.5 G/DL (ref 11.7–15.7)
IMM GRANULOCYTES # BLD: 0 10E3/UL
IMM GRANULOCYTES NFR BLD: 0 %
LDLC SERPL CALC-MCNC: 47 MG/DL
LYMPHOCYTES # BLD AUTO: 1.7 10E3/UL (ref 0.8–5.3)
LYMPHOCYTES NFR BLD AUTO: 26 %
MCH RBC QN AUTO: 30.2 PG (ref 26.5–33)
MCHC RBC AUTO-ENTMCNC: 31.5 G/DL (ref 31.5–36.5)
MCV RBC AUTO: 96 FL (ref 78–100)
MONOCYTES # BLD AUTO: 0.6 10E3/UL (ref 0–1.3)
MONOCYTES NFR BLD AUTO: 9 %
NEUTROPHILS # BLD AUTO: 3.9 10E3/UL (ref 1.6–8.3)
NEUTROPHILS NFR BLD AUTO: 57 %
NONHDLC SERPL-MCNC: 62 MG/DL
PLATELET # BLD AUTO: 266 10E3/UL (ref 150–450)
POTASSIUM SERPL-SCNC: 4.4 MMOL/L (ref 3.4–5.3)
PROT SERPL-MCNC: 6.9 G/DL (ref 6.4–8.3)
RBC # BLD AUTO: 4.47 10E6/UL (ref 3.8–5.2)
SODIUM SERPL-SCNC: 141 MMOL/L (ref 135–145)
TRIGL SERPL-MCNC: 77 MG/DL
WBC # BLD AUTO: 6.8 10E3/UL (ref 4–11)

## 2024-10-09 PROCEDURE — 85025 COMPLETE CBC W/AUTO DIFF WBC: CPT | Performed by: PHYSICIAN ASSISTANT

## 2024-10-09 PROCEDURE — 99215 OFFICE O/P EST HI 40 MIN: CPT | Mod: 25 | Performed by: PHYSICIAN ASSISTANT

## 2024-10-09 PROCEDURE — 80053 COMPREHEN METABOLIC PANEL: CPT | Performed by: PHYSICIAN ASSISTANT

## 2024-10-09 PROCEDURE — 69209 REMOVE IMPACTED EAR WAX UNI: CPT | Mod: 50 | Performed by: PHYSICIAN ASSISTANT

## 2024-10-09 PROCEDURE — 36415 COLL VENOUS BLD VENIPUNCTURE: CPT | Performed by: PHYSICIAN ASSISTANT

## 2024-10-09 PROCEDURE — 80061 LIPID PANEL: CPT | Performed by: PHYSICIAN ASSISTANT

## 2024-10-09 ASSESSMENT — PAIN SCALES - GENERAL: PAINLEVEL: MODERATE PAIN (4)

## 2024-10-09 NOTE — NURSING NOTE
Patient identified using two patient identifiers.  Ear exam showing wax occlusion completed by provider.  Solution: warm water was placed in the bilateral ear(s) via  irrigation tool: Rhino ear .      Leticia MAGAÑA MA on 10/9/2024 at 8:16 AM

## 2024-10-09 NOTE — PATIENT INSTRUCTIONS
-Stop Metoprolol. Monitor BP. Continue Losartan as is. Monitor BP.   -Cartoid US + DEXA scan ordered.  -Audiology through [facility]:     [facility]    [address] [place] [id number] [phone]  Main Phone    -RSV, COVID, FLU

## 2024-10-09 NOTE — PROGRESS NOTES
Assessment & Plan     Dizziness  Hypertension  Suspect related to lower blood pressure readings.  Stop metoprolol.  Continue losartan.  At home blood pressure readings.  Labs today.  Carotid ultrasound ordered.  Last in 2017.  Showed 50 to 69% stenosis left side.  - US Carotid Bilateral  - CBC with platelets and differential  - Comprehensive metabolic panel (BMP + Alb, Alk Phos, ALT, AST, Total. 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  Lipid panel today.  - Lipid panel reflex to direct LDL Non-fasting    Osteoporosis, unspecified osteoporosis type, unspecified pathological fracture presence  Continue Fosamax.  Repeat bone density.  Would prefer a call with results.  - DX Bone Density    Other fatigue  Check labs today.  May be related to lower blood pressure readings.  Reassess.  - CBC with platelets and differential  - Comprehensive metabolic panel (BMP + Alb, Alk Phos, ALT, AST, Total. Bili, TP)    Mixed conductive and sensorineural hearing loss of both ears  Cerumen impaction noted today.  Ear lavage performed.  Audiology referral placed.  - Adult Audiology  Referral    Bilateral impacted cerumen  Ear lavage performed.  Patient tolerated procedure well.  Follow-up with audiology for hearing test.  - REMOVE IMPACTED CERUMEN    Thoracic back pain  Consider injection with [facility].    40 minutes spent by me on the date of the encounter doing chart review, review of outside records, review of test results, interpretation of tests, patient visit, and documentation     The longitudinal plan of care for the diagnosis(es)/condition(s) as documented were addressed during this visit. Due to the added complexity in care, I will continue to support Ramya in the subsequent management and with ongoing continuity of care.      Subjective   Ramya is a 79 year old, presenting for the following health  issues:  Dizziness and Follow Up (Bone density test )    Here today for routine follow-up.  Moved into a senior co-op building which she is enjoying.    -Following with [facility] for thoracic back pain.  Recently completed MRI which were reviewed.  Upcoming injection plan.  Degenerative changes noted as well as bulging disks.    -Has been on Fosamax for approximately 2 years.  Due for bone density.  Tolerating well.    -Notes episodes of dizziness when getting up too quickly or moving across the room too quickly.  Very brief period approximately 30 seconds.  Denies falls.  Denies vision changes, headache, palpitations, chest pain.  Has been monitoring her blood pressure over the past few months.  Noted hypotension on multiple occasions including readings as low as 69/47.  Taking metoprolol 12.5 mg twice daily + losartan 50 mg daily.     -Due for follow-up with Dr. Wolf in regards to peripheral vascular disease.    -Wishes to chat with audiology and have a hearing test.  Notes some decreased hearing.    -Ongoing shortness of breath when doing simple activities.  Significant workup with lexascan, CT chest, PFTs.      History of Present Illness       Reason for visit:  Periodic dizzy spells -osteoporosis check-hearing check    She eats 4 or more servings of fruits and vegetables daily.She consumes 1 sweetened beverage(s) daily.She exercises with enough effort to increase her heart rate 9 or less minutes per day.  She exercises with enough effort to increase her heart rate 3 or less days per week.   She is taking medications regularly.       Review of Systems  Constitutional, neuro, ENT, endocrine, pulmonary, cardiac, gastrointestinal, genitourinary, musculoskeletal, integument and psychiatric systems are negative, except as otherwise noted.      Objective    /70 (BP Location: Left arm, Patient Position: Sitting, Cuff Size: Adult Regular)   Pulse 95   Temp 97.1  F (36.2  C) (Temporal)   Resp 16   Ht 1.778 m (5'  "10\")   Wt 75 kg (165 lb 6.4 oz)   SpO2 98%   BMI 23.73 kg/m    Body mass index is 23.73 kg/m .  Physical Exam   GENERAL: alert and no distress  EYES: Eyes grossly normal to inspection, PERRL and conjunctivae and sclerae normal  HENT: Bilateral cerumen impaction otherwise ear canals and TM's normal, nose and mouth without ulcers or lesions  NECK: no adenopathy, no asymmetry, masses, or scars.  No carotid bruits  RESP: lungs clear to auscultation - no rales, rhonchi or wheezes  CV: regular rate and rhythm, normal S1 S2, no S3 or S4, no murmur, click or rub, no peripheral edema  MS: no gross musculoskeletal defects noted, no edema  SKIN: no suspicious lesions or rashes  NEURO: Normal strength and tone, mentation intact and speech normal  PSYCH: mentation appears normal, affect normal/bright    The likelihood of other entities in the differential is insufficient to justify any further testing for them at this time. This was explained to the patient. The patient was advised that persistent or worsening symptoms would require further evaluation. Patient advised to call the office and if unable to reach to go to the emergency room if they develop any new or worsening symptoms. Expressed understanding and agreement with above stated plan.         Signed Electronically by: Tejinder Gonsalez PA-C    "

## 2024-10-10 NOTE — RESULT ENCOUNTER NOTE
Hi Ramya,     Electrolytes, kidney function, and liver function are stable.  Cholesterol looks perfect.  Blood counts show no signs of anemia.    Let me know if you have any questions or concerns,     Tejinder Gonsalez PA-C  [facility]

## 2024-10-20 DIAGNOSIS — M81.0 OSTEOPOROSIS, UNSPECIFIED OSTEOPOROSIS TYPE, UNSPECIFIED PATHOLOGICAL FRACTURE PRESENCE: ICD-10-CM

## 2024-10-20 DIAGNOSIS — S22.088A OTHER FRACTURE OF T11-T12 VERTEBRA, INITIAL ENCOUNTER FOR CLOSED FRACTURE (H): ICD-10-CM

## 2024-10-21 RX ORDER — ALENDRONATE SODIUM 70 MG/1
70 TABLET ORAL
Qty: 12 TABLET | Refills: 1 | Status: SHIPPED | OUTPATIENT
Start: 2024-10-21

## 2024-10-21 NOTE — TELEPHONE ENCOUNTER
Prescription approved per [facility] Refill Protocol.  Padmaja Anderson RN  [facility]

## 2024-10-29 ENCOUNTER — ANCILLARY PROCEDURE (OUTPATIENT)
Dept: ULTRASOUND IMAGING | Facility: CLINIC | Age: 79
End: 2024-10-29
Attending: PHYSICIAN ASSISTANT
Payer: COMMERCIAL

## 2024-10-29 ENCOUNTER — HOSPITAL ENCOUNTER (OUTPATIENT)
Dept: BONE DENSITY | Facility: CLINIC | Age: 79
Discharge: HOME | End: 2024-10-29
Attending: PHYSICIAN ASSISTANT
Payer: COMMERCIAL

## 2024-10-29 DIAGNOSIS — M81.0 OSTEOPOROSIS, UNSPECIFIED OSTEOPOROSIS TYPE, UNSPECIFIED PATHOLOGICAL FRACTURE PRESENCE: ICD-10-CM

## 2024-10-29 DIAGNOSIS — R42 DIZZINESS: ICD-10-CM

## 2024-10-29 PROCEDURE — 77080 DXA BONE DENSITY AXIAL: CPT

## 2024-10-29 PROCEDURE — 93880 EXTRACRANIAL BILAT STUDY: CPT

## 2024-10-29 NOTE — RESULT ENCOUNTER NOTE
Rudy Bui,     Less than 50% stenosis in both the right and left internal carotid arteries which is very reassuring.  External carotid artery with some degree of stenosis or narrowing.  Can discuss with Dr. Wolf in future.     Let me know if you have any questions or concerns,     Tejinder Gonsalez PA-C  [facility]

## 2024-10-29 NOTE — RESULT ENCOUNTER NOTE
Rudy Bui,     Big improvement in your bone density - up 13% in your lumbar spine and up 2.1% in your hips. Keep up the Vitamin D + Calcium + Fosamax.     Let me know if you have any questions or concerns,     Tejinder Gonsalez PA-C  [facility]

## 2024-12-03 ENCOUNTER — PATIENT OUTREACH (OUTPATIENT)
Dept: CARE COORDINATION | Facility: CLINIC | Age: 79
End: 2024-12-03
Payer: COMMERCIAL

## 2024-12-13 ENCOUNTER — HOSPITAL ENCOUNTER (OUTPATIENT)
Dept: ULTRASOUND IMAGING | Facility: CLINIC | Age: 79
Discharge: HOME | End: 2024-12-13
Attending: INTERNAL MEDICINE | Admitting: INTERNAL MEDICINE
Payer: COMMERCIAL

## 2024-12-13 DIAGNOSIS — I73.9 CLAUDICATION (H): ICD-10-CM

## 2024-12-13 PROCEDURE — 93924 LWR XTR VASC STDY BILAT: CPT

## 2024-12-16 DIAGNOSIS — I10 ESSENTIAL HYPERTENSION: ICD-10-CM

## 2024-12-16 DIAGNOSIS — E78.5 HYPERLIPIDEMIA LDL GOAL <70: ICD-10-CM

## 2024-12-16 DIAGNOSIS — I73.9 CLAUDICATION (H): ICD-10-CM

## 2024-12-17 RX ORDER — LOSARTAN POTASSIUM 50 MG/1
50 TABLET ORAL DAILY
Qty: 90 TABLET | Refills: 0 | Status: SHIPPED | OUTPATIENT
Start: 2024-12-17

## 2024-12-17 RX ORDER — ROSUVASTATIN CALCIUM 40 MG/1
40 TABLET, COATED ORAL DAILY
Qty: 90 TABLET | Refills: 0 | Status: SHIPPED | OUTPATIENT
Start: 2024-12-17

## 2024-12-17 NOTE — TELEPHONE ENCOUNTER
Unable to fill per [facility] protocol.  Medication and pharmacy loaded.    Sophia LIVINGSTON, RN    [facility]  Office: [phone]  Fax: [phone]

## 2024-12-26 ENCOUNTER — OFFICE VISIT (OUTPATIENT)
Dept: OTHER | Facility: CLINIC | Age: 79
End: 2024-12-26
Attending: INTERNAL MEDICINE
Payer: COMMERCIAL

## 2024-12-26 VITALS — HEART RATE: 88 BPM | SYSTOLIC BLOOD PRESSURE: 118 MMHG | DIASTOLIC BLOOD PRESSURE: 69 MMHG | OXYGEN SATURATION: 99 %

## 2024-12-26 DIAGNOSIS — I73.9 CLAUDICATION (H): Primary | ICD-10-CM

## 2024-12-26 DIAGNOSIS — E78.5 HYPERLIPIDEMIA LDL GOAL <70: ICD-10-CM

## 2024-12-26 DIAGNOSIS — R73.01 IFG (IMPAIRED FASTING GLUCOSE): ICD-10-CM

## 2024-12-26 DIAGNOSIS — I10 ESSENTIAL HYPERTENSION: ICD-10-CM

## 2024-12-26 PROCEDURE — G0463 HOSPITAL OUTPT CLINIC VISIT: HCPCS | Performed by: INTERNAL MEDICINE

## 2024-12-26 RX ORDER — ROSUVASTATIN CALCIUM 40 MG/1
40 TABLET, COATED ORAL DAILY
Qty: 90 TABLET | Refills: 3 | Status: SHIPPED | OUTPATIENT
Start: 2024-12-26

## 2024-12-26 RX ORDER — LOSARTAN POTASSIUM 50 MG/1
50 TABLET ORAL DAILY
Qty: 90 TABLET | Refills: 3 | Status: SHIPPED | OUTPATIENT
Start: 2024-12-26

## 2024-12-26 NOTE — PROGRESS NOTES
VASCULAR MEDICINE FOLLOW UP VISIT        (I73.9) Claudication (H)    Comment: Not lifestyle limiting. Advised continued walking. Do not clip toenails agressively. She does not want to be on more meds and as such Pletal and Xarelto were not offered to her. Stress ABIs of 12/13/24 reveal that at rest, the right ZOILA is moderately diminished (0.70). After exercise, there is an abnormal significant decrease in the ZOILA to 0.30. At rest, the left ZOILA (1.0) is within the normal range. After  exercise, there is an abnormal significant decrease in the ZOILA to 0.60.    I discussed PAD rehab with the patient. She elects to undertake this. Obtain below in 12/2025. Present to clinic sooner if claudication worsens, or CLI sxs develop.    Plan: US ZOILA Doppler with Exercise Bilateral in 04/2025, RTC one week later.             (E78.5) Hyperlipidemia LDL goal <70  Comment: At LDL goal  on a statin  Plan: Check lipid panel in 12/2025. Consider use of pelacarsen for Lp(a) elevation when it becomes avialable on the market            (R73.01) IFG (impaired fasting glucose)  Comment: avoid CHO, at goal  Plan: Hemoglobin A1c in in 12/2025            (I10) Essential hypertension  Comment: At home, SBP averaged /. Here on 12/13/23, BP was 171/139 so we added metoprolol 12.5 mg PO BID to losartan 50 mg daily. She then got low readings of 90/60 and was dizzy. RTC today for a BP check, home BP averages 120/70.  Plan: Continue losartan 50 mg daily. RTC 12/2024 for a BP check, bring home BP log book in. If throat hoarseness with addition of metoprolol becomes more bothersome,. RTC to discuss other BP med options , which could include amlodipine, Imdur, or hydralazine.       The longitudinal care of plan for Ramya was addressed during this visit. Due to added complexity of care, we will continue to support Ramya Hillman  and the subsequent management of these conditions and with ongoing continuity of care for these conditions.         31 minutes total medical care on today's date.                 HPI: Ramya Hillman is a 79 year old female who was seen today regarding bilateral lower leg claudication.  She is a former smoker and who quit smoking all together in 8/2019 after her appointment with Dr. Salinas, who has since moved to [place]. When seen on 9/17/2020, the patient could walk about 4 blocks/15 minutes before she would get cramping calf pain that resolved with short rest. That distance was very repeatable. She had improved since 2021 when she could only walk for 2 blocks and four minutes.  She denied chest pain and no stroke or TIA symptoms. Currently, she does not feel her lifestyle is severely limited by her symptoms, and she is now walking regularly. She has no CLI sxs or tissue loss.      On a statin, her LDL <70, LDL-P<1000, Lp(a) moderately elevated at 45. BP today is 118/69. Her BP at home runs 120/70. Her resting ZOILA was  originally  0.56 (post exercise 0.62, previously 0.25)on the right, previously 0.58 with monophasic waveforms;Her left ZOILA is 0.98, previously 0.90 (post exercise 0.80, previously 0.72), with triphasic waveforms. She can currently walk 6 blocks before she develops pain in the RLE. She will stop to rest for 1-2 minutes and her sxs resolve to the point that she can walk again without limitation.This does not appreciably impact her ADLs. She has no nocturnal ischemic rest pain or nonhealing ulcers.     When seen in 4/2022, her BP was elevated. Med changes were not made at that time. She was advised to RTC with a month's worth of BP readings from home. In 05/22, it was noted her SBP averaged 119 at home. Now her SBP averages 120.              Review Of Systems  Skin: negative  Eyes: negative  Ears/Nose/Throat: negative  Respiratory: No shortness of breath, dyspnea on exertion, cough, or hemoptysis  Cardiovascular: negative  Gastrointestinal: negative  Genitourinary: negative  Musculoskeletal: negative  Neurologic:  negative  Psychiatric: negative  Hematologic/Lymphatic/Immunologic: negative  Endocrine: negative        PAST MEDICAL HISTORY:                  Past Medical History:   Diagnosis Date    Arthritis 01/01/2000    Hands & feet    Chest pain     Chronic ischemic heart disease     Condyloma     had for5 years, 20 years ago,recurrence 2012 biopsy taken    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)     Emphysema lung (H) 01/06/2022    Family history of heart disease     History of basal cell carcinoma 10/17/2017    Hyperlipidemia     Hypertension April 2021    PVD (peripheral vascular disease) (H)     Tobacco abuse        PAST SURGICAL HISTORY:                  Past Surgical History:   Procedure Laterality Date    BIOPSY  3/1/2018    Mole on back    BUNIONECTOMY Right 8/26/2019    Procedure: BUNIONECTOMY RIGHT FOOT;  Surgeon: Rajni Sanchez MD;  Location: [facility] OR    COLONOSCOPY  10/18/2018    1 polyp - all clear    NO HISTORY OF SURGERY      ORTHOPEDIC SURGERY  1/1/2012    Rotator Cuff - Shoulder       CURRENT MEDICATIONS:                  Current Outpatient Medications   Medication Sig Dispense Refill    albuterol (PROAIR HFA/PROVENTIL HFA/VENTOLIN HFA) 108 (90 Base) MCG/ACT inhaler Inhale 2 puffs into the lungs every 6 hours as needed for shortness of breath 18 g 1    alendronate (FOSAMAX) 70 MG tablet TAKE 1 TABLET(70 MG) BY MOUTH EVERY 7 DAYS 12 tablet 1    aspirin 81 MG tablet Take by mouth daily 1 tablet     Cholecalciferol (VITAMIN D) 1000 UNITS capsule Take 1 capsule by mouth daily      Coenzyme Q10 (CO Q10) 100 MG CAPS Take by mouth daily      losartan (COZAAR) 50 MG tablet Take 1 tablet (50 mg) by mouth daily. 90 tablet 0    multivitamin w/minerals (THERA-VIT-M) tablet Take 1 tablet by mouth daily      Omega-3 Fatty Acids (FISH OIL) 1000 MG CPDR Take by mouth daily      omeprazole (PRILOSEC) 40 MG DR capsule Take 1 capsule (40 mg) by mouth daily 90  capsule 1    rosuvastatin (CRESTOR) 40 MG tablet Take 1 tablet (40 mg) by mouth daily. 90 tablet 0       ALLERGIES:                  Allergies   Allergen Reactions    No Known Allergies        SOCIAL HISTORY:                  Social History     Socioeconomic History    Marital status:      Spouse name: Not on file    Number of children: 0    Years of education: Not on file    Highest education level: Not on file   Occupational History    Occupation: Sales   Tobacco Use    Smoking status: Former     Current packs/day: 0.00     Types: Cigarettes     Start date: 1990     Quit date: 2018     Years since quittin.4    Smokeless tobacco: Never    Tobacco comments:      2-3 cigs per day off and on   Vaping Use    Vaping status: Never Used   Substance and Sexual Activity    Alcohol use: Yes     Comment: Very minimal    Drug use: No    Sexual activity: Not Currently     Birth control/protection: Post-menopausal     Comment: postmenopausal   Other Topics Concern     Service Not Asked    Blood Transfusions Not Asked    Caffeine Concern No     Comment: 1 a day. mostly decaf    Occupational Exposure Not Asked    Hobby Hazards Not Asked    Sleep Concern Not Asked    Stress Concern Not Asked    Weight Concern Not Asked    Special Diet Not Asked    Back Care Not Asked    Exercise No     Comment: likes to walk    Bike Helmet Not Asked    Seat Belt Not Asked    Self-Exams Not Asked    Parent/sibling w/ CABG, MI or angioplasty before 65F 55M? Yes   Social History Narrative    Not on file     Social Drivers of Health     Financial Resource Strain: Low Risk  (2024)    Financial Resource Strain     Within the past 12 months, have you or your family members you live with been unable to get utilities (heat, electricity) when it was really needed?: No   Food Insecurity: Low Risk  (2024)    Food Insecurity     Within the past 12 months, did you worry that your food would run out before you got money to buy  more?: No     Within the past 12 months, did the food you bought just not last and you didn t have money to get more?: No   Transportation Needs: Low Risk  (2024)    Transportation Needs     Within the past 12 months, has lack of transportation kept you from medical appointments, getting your medicines, non-medical meetings or appointments, work, or from getting things that you need?: No   Physical Activity: Not on file   Stress: Not on file   Social Connections: Not on file   Interpersonal Safety: Low Risk  (2024)    Interpersonal Safety     Do you feel physically and emotionally safe where you currently live?: Yes     Within the past 12 months, have you been hit, slapped, kicked or otherwise physically hurt by someone?: No     Within the past 12 months, have you been humiliated or emotionally abused in other ways by your partner or ex-partner?: No   Housing Stability: Low Risk  (2024)    Housing Stability     Do you have housing? : Yes     Are you worried about losing your housing?: No       FAMILY HISTORY:                   Family History   Problem Relation Age of Onset    Cerebrovascular Disease Mother     Coronary Artery Disease Mother             Osteoporosis Mother     Hyperlipidemia Mother     Coronary Artery Disease Father 81            Hyperlipidemia Father     Coronary Artery Disease Brother             Hyperlipidemia Brother     Coronary Artery Disease Sister 52            Hyperlipidemia Sister     Coronary Artery Disease Brother 62    Hyperlipidemia Brother     Coronary Artery Disease Sister     Hyperlipidemia Sister     Coronary Artery Disease Sister             Hyperlipidemia Sister     Breast Cancer Sister 80            Breast Cancer Niece     Coronary Artery Disease Sister     Breast Cancer Sister     Coronary Artery Disease Sister              Physical exam Reveals:    O/P: WNL  HEENT: WNL  NECK: No JVD, thyromegaly, or  lymphadenopathy  HEART: RRR, no murmurs, gallops, or rubs  LUNGS: CTA bilaterally without rales, wheezes, or rhonchi  GI: NABS, nondistended, nontender, soft  EXT:without cyanosis, clubbing, or edema  NEURO: nonfocal  : no flank tenderness       All relevant labs and imaging reviewed by myself on today's date.

## 2024-12-26 NOTE — PROGRESS NOTES
Patient is here to discuss follow up.    /69 (BP Location: Right arm, Patient Position: Sitting, Cuff Size: Adult Regular)   Pulse 88   SpO2 99%     Questions patient would like addressed today are: blood pressure.  Not taking metropolol was having dizzy spells in summer and lower blood pressure readings.  Has not taken the metropolol since October 2024.    Refills are needed: No    Has homecare services and agency name:  Elisa Gonzalez RN

## 2024-12-31 ENCOUNTER — PATIENT OUTREACH (OUTPATIENT)
Dept: CARE COORDINATION | Facility: CLINIC | Age: 79
End: 2024-12-31
Payer: COMMERCIAL

## 2025-01-13 ENCOUNTER — HOSPITAL ENCOUNTER (OUTPATIENT)
Dept: CARDIAC REHAB | Facility: CLINIC | Age: 80
Discharge: HOME | End: 2025-01-13
Attending: INTERNAL MEDICINE
Payer: COMMERCIAL

## 2025-01-13 DIAGNOSIS — I73.9 CLAUDICATION: ICD-10-CM

## 2025-01-13 PROCEDURE — 93668 PERIPHERAL VASCULAR REHAB: CPT

## 2025-01-15 ENCOUNTER — HOSPITAL ENCOUNTER (OUTPATIENT)
Dept: CARDIAC REHAB | Facility: CLINIC | Age: 80
Discharge: HOME | End: 2025-01-15
Attending: INTERNAL MEDICINE
Payer: COMMERCIAL

## 2025-01-15 PROCEDURE — 93668 PERIPHERAL VASCULAR REHAB: CPT | Performed by: REHABILITATION PRACTITIONER

## 2025-01-17 ENCOUNTER — HOSPITAL ENCOUNTER (OUTPATIENT)
Dept: CARDIAC REHAB | Facility: CLINIC | Age: 80
Discharge: HOME | End: 2025-01-17
Attending: INTERNAL MEDICINE
Payer: COMMERCIAL

## 2025-01-17 PROCEDURE — 93668 PERIPHERAL VASCULAR REHAB: CPT | Performed by: CLINICAL EXERCISE PHYSIOLOGIST

## 2025-01-22 ENCOUNTER — HOSPITAL ENCOUNTER (OUTPATIENT)
Dept: CARDIAC REHAB | Facility: CLINIC | Age: 80
Discharge: HOME | End: 2025-01-22
Attending: INTERNAL MEDICINE
Payer: COMMERCIAL

## 2025-01-22 PROCEDURE — 93668 PERIPHERAL VASCULAR REHAB: CPT | Performed by: REHABILITATION PRACTITIONER

## 2025-01-24 ENCOUNTER — HOSPITAL ENCOUNTER (OUTPATIENT)
Dept: CARDIAC REHAB | Facility: CLINIC | Age: 80
Discharge: HOME | End: 2025-01-24
Attending: INTERNAL MEDICINE
Payer: COMMERCIAL

## 2025-01-24 ENCOUNTER — TRANSFERRED RECORDS (OUTPATIENT)
Dept: FAMILY MEDICINE | Facility: CLINIC | Age: 80
End: 2025-01-24

## 2025-01-24 PROCEDURE — 93668 PERIPHERAL VASCULAR REHAB: CPT

## 2025-01-26 SDOH — HEALTH STABILITY: PHYSICAL HEALTH: ON AVERAGE, HOW MANY DAYS PER WEEK DO YOU ENGAGE IN MODERATE TO STRENUOUS EXERCISE (LIKE A BRISK WALK)?: 3 DAYS

## 2025-01-26 SDOH — HEALTH STABILITY: PHYSICAL HEALTH: ON AVERAGE, HOW MANY MINUTES DO YOU ENGAGE IN EXERCISE AT THIS LEVEL?: 30 MIN

## 2025-01-26 ASSESSMENT — SOCIAL DETERMINANTS OF HEALTH (SDOH): HOW OFTEN DO YOU GET TOGETHER WITH FRIENDS OR RELATIVES?: TWICE A WEEK

## 2025-01-27 ENCOUNTER — HOSPITAL ENCOUNTER (OUTPATIENT)
Dept: CARDIAC REHAB | Facility: CLINIC | Age: 80
Discharge: HOME | End: 2025-01-27
Attending: INTERNAL MEDICINE
Payer: COMMERCIAL

## 2025-01-27 PROCEDURE — 93668 PERIPHERAL VASCULAR REHAB: CPT

## 2025-01-28 ENCOUNTER — OFFICE VISIT (OUTPATIENT)
Dept: FAMILY MEDICINE | Facility: CLINIC | Age: 80
End: 2025-01-28
Payer: COMMERCIAL

## 2025-01-28 VITALS
RESPIRATION RATE: 16 BRPM | DIASTOLIC BLOOD PRESSURE: 78 MMHG | HEIGHT: 70 IN | SYSTOLIC BLOOD PRESSURE: 112 MMHG | WEIGHT: 169 LBS | OXYGEN SATURATION: 98 % | HEART RATE: 90 BPM | TEMPERATURE: 97.8 F | BODY MASS INDEX: 24.2 KG/M2

## 2025-01-28 DIAGNOSIS — Z00.00 ENCOUNTER FOR ANNUAL WELLNESS EXAM IN MEDICARE PATIENT: Primary | ICD-10-CM

## 2025-01-28 DIAGNOSIS — J43.9 PULMONARY EMPHYSEMA, UNSPECIFIED EMPHYSEMA TYPE (H): ICD-10-CM

## 2025-01-28 DIAGNOSIS — R09.82 POST-NASAL DRIP: ICD-10-CM

## 2025-01-28 DIAGNOSIS — Z29.11 NEED FOR VACCINATION AGAINST RESPIRATORY SYNCYTIAL VIRUS: ICD-10-CM

## 2025-01-28 DIAGNOSIS — E78.5 HYPERLIPIDEMIA LDL GOAL <100: ICD-10-CM

## 2025-01-28 DIAGNOSIS — M81.0 OSTEOPOROSIS, UNSPECIFIED OSTEOPOROSIS TYPE, UNSPECIFIED PATHOLOGICAL FRACTURE PRESENCE: ICD-10-CM

## 2025-01-28 DIAGNOSIS — Z87.891 HISTORY OF TOBACCO USE, PRESENTING HAZARDS TO HEALTH: ICD-10-CM

## 2025-01-28 DIAGNOSIS — I10 ESSENTIAL HYPERTENSION: ICD-10-CM

## 2025-01-28 DIAGNOSIS — I73.9 PVD (PERIPHERAL VASCULAR DISEASE): ICD-10-CM

## 2025-01-28 PROCEDURE — G0439 PPPS, SUBSEQ VISIT: HCPCS | Performed by: PHYSICIAN ASSISTANT

## 2025-01-28 RX ORDER — AMOXICILLIN 500 MG/1
TABLET, FILM COATED ORAL
COMMUNITY
Start: 2024-02-06

## 2025-01-28 RX ORDER — CLOBETASOL PROPIONATE 0.5 MG/ML
SOLUTION TOPICAL
COMMUNITY

## 2025-01-28 RX ORDER — FLUTICASONE PROPIONATE 50 MCG
1 SPRAY, SUSPENSION (ML) NASAL DAILY
Qty: 18.2 ML | Refills: 1 | Status: SHIPPED | OUTPATIENT
Start: 2025-01-28

## 2025-01-28 RX ORDER — DESONIDE 0.5 MG/G
CREAM TOPICAL
COMMUNITY
Start: 2024-09-05

## 2025-01-28 NOTE — PROGRESS NOTES
Preventive Care Visit  [facility] RALPH Gonsalez PA-C, Physician Assistant - Medical  Jan 28, 2025      Assessment & Plan     Encounter for annual wellness exam in Medicare patient  Annual labs reviewed.  Completed recently with vascular team Healthy diet and exercise reviewed. Recommended routine dental, eye, and skin screenings.  Sees eye doctor, dentist, and dermatologist regularly.      Essential hypertension  Well-controlled on losartan    Hyperlipidemia LDL goal <100  Continue rosuvastatin    PVD (peripheral vascular disease)  Continue follow-up with vascular team.    History of tobacco use, presenting hazards to health  Pulmonary emphysema, unspecified emphysema type (H)  CT lung screening ordered  - CT Chest Lung Cancer Screen Low Dose Without    Osteoporosis, unspecified osteoporosis type, unspecified pathological fracture presence  Continue Fosamax    Need for vaccination against respiratory syncytial virus  RSV shot at the pharmacy    Post-nasal drip  Has been experiencing some rhinorrhea.  Noted significant dryness in her throat after taking Claritin.  Recommended starting a steroid nasal spray.  Can follow-up as needed for this.  - fluticasone (FLONASE) 50 MCG/ACT nasal spray  Dispense: 18.2 mL; Refill: 1      Patient has been advised of split billing requirements and indicates understanding: Yes    Counseling  Appropriate preventive services were addressed with this patient via screening, questionnaire, or discussion as appropriate for fall prevention, nutrition, physical activity, Tobacco-use cessation, social engagement, weight loss and cognition.  Checklist reviewing preventive services available has been given to the patient.  Reviewed patient's diet, addressing concerns and/or questions.   She is at risk for lack of exercise and has been provided with information to increase physical activity for the benefit of her well-being.   The patient was provided with written  information regarding signs of hearing loss.         Subjective   Ramya Crews is a very pleasant 79 year old, presenting for the following:  Physical (AWV)    Here today for annual wellness visit.     Recent visit with vascular team.  Undergoing cardiac rehab for her PVD which she finds helpful.    Blood pressure well-controlled on losartan.  Metoprolol was causing her to be dizzy.    Recently completed 6 weeks of back physical therapy with [facility].  History of compression fractures.  They believe her most recent back pain was related to muscle weakness.  Has noted improvement in her back pain with PT.  Often exacerbated by prolonged by long periods of standing.    She has been tolerating Fosamax very well for the past year.  13% increase in her lumbar spine BMD and 2% increase in her bilateral hip BMD.  Plan to continue this.    She has completed her COVID/flu shot at the pharmacy.  Due for RSV shot.    Requesting handicap parking sticker for above PVD which is reasonable.            Health Care Directive  Patient does not have a Health Care Directive:       1/26/2025   General Health   How would you rate your overall physical health? Good   Feel stress (tense, anxious, or unable to sleep) Not at all         1/26/2025   Nutrition   Diet: Regular (no restrictions)         1/26/2025   Exercise   Days per week of moderate/strenous exercise 3 days   Average minutes spent exercising at this level 30 min         1/26/2025   Social Factors   Frequency of gathering with friends or relatives Twice a week   Worry food won't last until get money to buy more No   Food not last or not have enough money for food? No   Do you have housing? (Housing is defined as stable permanent housing and does not include staying ouside in a car, in a tent, in an abandoned building, in an overnight shelter, or couch-surfing.) Yes   Are you worried about losing your housing? No   Lack of transportation? No   Unable to get utilities (heat,electricity)?  No         2025   Fall Risk   Fallen 2 or more times in the past year? No   Trouble with walking or balance? No          2025   Activities of Daily Living- Home Safety   Needs help with the following daily activites None of the above   Safety concerns in the home None of the above         2025   Dental   Dentist two times every year? Yes         2025   Hearing Screening   Hearing concerns? (!) TROUBLE UNDERSTANDING SOFT OR WHISPERED SPEECH.         2025   Driving Risk Screening   Patient/family members have concerns about driving No         2025   General Alertness/Fatigue Screening   Have you been more tired than usual lately? No         2025   Urinary Incontinence Screening   Bothered by leaking urine in past 6 months No         2025   TB Screening   Were you born outside of the [place]? No         Today's PHQ-2 Score:       2025     8:33 AM   PHQ-2 (  [employer])   Q1: Little interest or pleasure in doing things 0   Q2: Feeling down, depressed or hopeless 0   PHQ-2 Score 0    Q1: Little interest or pleasure in doing things Not at all   Q2: Feeling down, depressed or hopeless Not at all   PHQ-2 Score 0       Patient-reported           2025   Substance Use   Alcohol more than 3/day or more than 7/wk No   Do you have a current opioid prescription? No   How severe/bad is pain from 1 to 10? 3/10   Do you use any other substances recreationally? No     Social History     Tobacco Use    Smoking status: Former     Current packs/day: 0.00     Types: Cigarettes     Start date: 1990     Quit date: 2018     Years since quittin.4    Smokeless tobacco: Never    Tobacco comments:      2-3 cigs per day off and on   Vaping Use    Vaping status: Never Used   Substance Use Topics    Alcohol use: Yes     Comment: Very minimal    Drug use: No              ASCVD Risk   The 10-year ASCVD risk score (Jennifer NDIAYE, et al., 2019) is: 20.6%    Values used to calculate the  score:      Age: 79 years      Sex: Female      Is Non- : No      Diabetic: No      Tobacco smoker: No      Systolic Blood Pressure: 112 mmHg      Is BP treated: Yes      HDL Cholesterol: 69 mg/dL      Total Cholesterol: 135 mg/dL          Reviewed and updated as needed this visit by Provider                    Past Medical History:   Diagnosis Date    Arthritis 2000    Hands & feet    Chest pain     Chronic ischemic heart disease     Condyloma     had for5 years, 20 years ago,recurrence  biopsy taken    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)     Emphysema lung (H) 2022    Family history of heart disease     History of basal cell carcinoma 10/17/2017    Hyperlipidemia     Hypertension 2021    PVD (peripheral vascular disease)     Tobacco abuse      Past Surgical History:   Procedure Laterality Date    BIOPSY  3/1/2018    Mole on back    BUNIONECTOMY Right 2019    Procedure: BUNIONECTOMY RIGHT FOOT;  Surgeon: Rajni Sanchez MD;  Location: [facility] OR    COLONOSCOPY  10/18/2018    1 polyp - all clear    NO HISTORY OF SURGERY      ORTHOPEDIC SURGERY  2012    Rotator Cuff - Shoulder     OB History    Para Term  AB Living   0 0 0 0 0 0   SAB IAB Ectopic Multiple Live Births   0 0 0 0 0     Lab work is in process  Labs reviewed in EPIC  BP Readings from Last 3 Encounters:   25 112/78   24 118/69   10/09/24 107/70    Wt Readings from Last 3 Encounters:   25 76.7 kg (169 lb)   10/09/24 75 kg (165 lb 6.4 oz)   24 76.7 kg (169 lb)                  Patient Active Problem List   Diagnosis    PVD (peripheral vascular disease)    Chest pain    Hyperlipidemia LDL goal <100    Painful tongue    Chronic ischemic heart disease    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)    PAD (peripheral artery disease)     Claudication    Gastroesophageal reflux disease with esophagitis without hemorrhage    Former smoker    Skin lesions, generalized    Emphysema lung (H)    Tobacco use disorder    Rotator cuff syndrome    History of dysplastic nevus    History of basal cell carcinoma    Osteoporosis, unspecified osteoporosis type, unspecified pathological fracture presence    Essential hypertension     Past Surgical History:   Procedure Laterality Date    BIOPSY  3/1/2018    Mole on back    BUNIONECTOMY Right 2019    Procedure: BUNIONECTOMY RIGHT FOOT;  Surgeon: Rajni Sanchez MD;  Location: [facility] OR    COLONOSCOPY  10/18/2018    1 polyp - all clear    NO HISTORY OF SURGERY      ORTHOPEDIC SURGERY  2012    Rotator Cuff - Shoulder       Social History     Tobacco Use    Smoking status: Former     Current packs/day: 0.00     Types: Cigarettes     Start date: 1990     Quit date: 2018     Years since quittin.4    Smokeless tobacco: Never    Tobacco comments:      2-3 cigs per day off and on   Substance Use Topics    Alcohol use: Yes     Comment: Very minimal     Family History   Problem Relation Age of Onset    Cerebrovascular Disease Mother     Coronary Artery Disease Mother             Osteoporosis Mother     Hyperlipidemia Mother     Coronary Artery Disease Father 81            Hyperlipidemia Father     Coronary Artery Disease Brother             Hyperlipidemia Brother     Coronary Artery Disease Sister 52            Hyperlipidemia Sister     Coronary Artery Disease Brother 62    Hyperlipidemia Brother     Coronary Artery Disease Sister     Hyperlipidemia Sister     Coronary Artery Disease Sister             Hyperlipidemia Sister     Breast Cancer Sister 80            Breast Cancer Niece     Coronary Artery Disease Sister     Breast Cancer Sister     Coronary Artery Disease Sister          Current Outpatient Medications   Medication Sig Dispense Refill     albuterol (PROAIR HFA/PROVENTIL HFA/VENTOLIN HFA) 108 (90 Base) MCG/ACT inhaler Inhale 2 puffs into the lungs every 6 hours as needed for shortness of breath 18 g 1    alendronate (FOSAMAX) 70 MG tablet TAKE 1 TABLET(70 MG) BY MOUTH EVERY 7 DAYS 12 tablet 1    amoxicillin (AMOXIL) 500 MG tablet TAKE 1 TABLET BY MOUTH THREE TIMES DAILY X 7 DAYS START AFTER DENTAL SURGERY      aspirin 81 MG tablet Take by mouth daily 1 tablet     Cholecalciferol (VITAMIN D) 1000 UNITS capsule Take 1 capsule by mouth daily      clobetasol (TEMOVATE) 0.05 % external solution APPLY TOPICALLY TO THE SCALP 1 TO 2 TIMES A DAY FOR 1 TO 2 WEEKS AS NEEDED FOR FLARES      Coenzyme Q10 (CO Q10) 100 MG CAPS Take by mouth daily      desonide (DESOWEN) 0.05 % external cream Apply topically to rash in buttock crease twice a day for a minimum of one week up to two weeks then as needed for flares.      fluticasone (FLONASE) 50 MCG/ACT nasal spray Spray 1 spray into both nostrils daily. 18.2 mL 1    losartan (COZAAR) 50 MG tablet Take 1 tablet (50 mg) by mouth daily. 90 tablet 3    multivitamin w/minerals (THERA-VIT-M) tablet Take 1 tablet by mouth daily      Omega-3 Fatty Acids (FISH OIL) 1000 MG CPDR Take by mouth daily      omeprazole (PRILOSEC) 40 MG DR capsule Take 1 capsule (40 mg) by mouth daily 90 capsule 1    rosuvastatin (CRESTOR) 40 MG tablet Take 1 tablet (40 mg) by mouth daily. 90 tablet 3     Allergies   Allergen Reactions    No Known Allergies      Recent Labs   Lab Test 12/13/24  0911 10/09/24  0827 01/23/24  0948 11/30/23  0942 06/14/23  1201 04/18/23  0910 01/17/23  1105 04/06/22  1015 04/10/21  1244 03/22/21  0800 12/17/20  0843 09/01/20  0705 12/10/19  1020   A1C  --   --   --  5.7*  --  5.9*  --  5.7*  --    < > 5.9*   < >  --    LDL  --  47  --   --   --  48  --   --   --   --   --   --  85   HDL  --  64  --   --   --  67  --   --   --   --   --   --  64   TRIG  --  77  --   --   --  77  --   --   --   --   --   --  94   ALT 33 30  23  --  28  --    < >  --  32  --  29  --  23   CR 1.00* 1.09* 1.03*  --  0.92  --    < >  --  0.82  --  1.06*   < > 0.76   GFRESTIMATED 58* 52* 56*  --  64  --    < >  --  70  --  52*   < > 77   GFRESTBLACK  --   --   --   --   --   --   --   --  82  --  60*   < > 90   POTASSIUM 4.3 4.4 4.5  --  4.4  --    < >  --  4.9  --  4.3   < > 3.9   TSH  --   --  2.13  --  1.65  --   --  1.86  --   --  3.33  --  2.14    < > = values in this interval not displayed.      Current providers sharing in care for this patient include:  Patient Care Team:  Tejinder Gonsalez PA-C as PCP - General (Physician Assistant - Medical)  Dominik Wolf MD as Assigned Heart and Vascular Provider  Tejinder Gonsalez PA-C as Assigned PCP  Rosas Marcus EP as Cardiac Rehabilitation Therapist    The following health maintenance items are reviewed in Epic and correct as of today:  Health Maintenance   Topic Date Due    RSV VACCINE (1 - 1-dose 75+ series) Never done    INFLUENZA VACCINE (1) 09/01/2024    COVID-19 Vaccine (8 - 2024-25 season) 09/01/2024    MEDICARE ANNUAL WELLNESS VISIT  01/23/2025    LUNG CANCER SCREENING  02/01/2025    LIPID  10/09/2025    BMP  12/13/2025    MAMMO SCREENING  01/24/2026    ANNUAL REVIEW OF HM ORDERS  01/28/2026    FALL RISK ASSESSMENT  01/28/2026    DTAP/TDAP/TD IMMUNIZATION (2 - Td or Tdap) 11/02/2027    GLUCOSE  12/13/2027    ADVANCE CARE PLANNING  01/23/2029    DEXA  10/29/2039    SPIROMETRY  Completed    HEPATITIS C SCREENING  Completed    PHQ-2 (once per calendar year)  Completed    Pneumococcal Vaccine: 50+ Years  Completed    ZOSTER IMMUNIZATION  Completed    COPD ACTION PLAN  Addressed    HPV IMMUNIZATION  Aged Out    MENINGITIS IMMUNIZATION  Aged Out    RSV MONOCLONAL ANTIBODY  Aged Out    COLORECTAL CANCER SCREENING  Discontinued         Review of Systems  Constitutional, neuro, ENT, endocrine, pulmonary, cardiac, gastrointestinal, genitourinary, musculoskeletal, integument and  "psychiatric systems are negative, except as otherwise noted.     Objective    Exam  /78 (BP Location: Right arm, Patient Position: Sitting, Cuff Size: Adult Regular)   Pulse 90   Temp 97.8  F (36.6  C) (Temporal)   Resp 16   Ht 1.778 m (5' 10\")   Wt 76.7 kg (169 lb)   LMP  (LMP Unknown)   SpO2 98%   Breastfeeding No   BMI 24.25 kg/m     Estimated body mass index is 24.25 kg/m  as calculated from the following:    Height as of this encounter: 1.778 m (5' 10\").    Weight as of this encounter: 76.7 kg (169 lb).    Physical Exam  GENERAL: alert and no distress  EYES: Eyes grossly normal to inspection, PERRL and conjunctivae and sclerae normal  HENT: ear canals and TM's normal, nose and mouth without ulcers or lesions  NECK: no adenopathy, no asymmetry, masses, or scars  RESP: lungs clear to auscultation - no rales, rhonchi or wheezes  CV: regular rate and rhythm, normal S1 S2, no S3 or S4, no murmur, click or rub, no peripheral edema  ABDOMEN: soft, nontender, no hepatosplenomegaly, no masses and bowel sounds normal  MS: no gross musculoskeletal defects noted, no edema  SKIN: no suspicious lesions or rashes  NEURO: Normal strength and tone, mentation intact and speech normal  PSYCH: mentation appears normal, affect normal/bright        1/28/2025   Mini Cog   Clock Draw Score 2 Normal    2 Normal   3 Item Recall 3 objects recalled    3 objects recalled   Mini Cog Total Score 5    5       Multiple values from one day are sorted in reverse-chronological order         Signed Electronically by: Tejinder Gonsalez PA-C    "

## 2025-01-28 NOTE — PATIENT INSTRUCTIONS
-Keep up fosamax - repeat bone density in 2 years  -Lung screening ordered  -Flonase or any other steroid nasal spray to help with runny nose. Fluticasone nasal spray (generic).   -Prevar 20 + RSV at pharmacy

## 2025-01-29 ENCOUNTER — HOSPITAL ENCOUNTER (OUTPATIENT)
Dept: CARDIAC REHAB | Facility: CLINIC | Age: 80
Discharge: HOME | End: 2025-01-29
Attending: INTERNAL MEDICINE
Payer: COMMERCIAL

## 2025-01-29 PROCEDURE — 93668 PERIPHERAL VASCULAR REHAB: CPT | Performed by: REHABILITATION PRACTITIONER

## 2025-01-31 ENCOUNTER — HOSPITAL ENCOUNTER (OUTPATIENT)
Dept: CARDIAC REHAB | Facility: CLINIC | Age: 80
Discharge: HOME | End: 2025-01-31
Attending: INTERNAL MEDICINE
Payer: COMMERCIAL

## 2025-01-31 PROCEDURE — 93668 PERIPHERAL VASCULAR REHAB: CPT | Performed by: REHABILITATION PRACTITIONER

## 2025-02-03 ENCOUNTER — HOSPITAL ENCOUNTER (OUTPATIENT)
Dept: CARDIAC REHAB | Facility: CLINIC | Age: 80
Discharge: HOME | End: 2025-02-03
Attending: INTERNAL MEDICINE
Payer: COMMERCIAL

## 2025-02-03 PROCEDURE — 93668 PERIPHERAL VASCULAR REHAB: CPT

## 2025-02-05 ENCOUNTER — HOSPITAL ENCOUNTER (OUTPATIENT)
Dept: CARDIAC REHAB | Facility: CLINIC | Age: 80
Discharge: HOME | End: 2025-02-05
Attending: INTERNAL MEDICINE
Payer: COMMERCIAL

## 2025-02-05 PROCEDURE — 93668 PERIPHERAL VASCULAR REHAB: CPT | Performed by: REHABILITATION PRACTITIONER

## 2025-02-07 ENCOUNTER — HOSPITAL ENCOUNTER (OUTPATIENT)
Dept: CARDIAC REHAB | Facility: CLINIC | Age: 80
Discharge: HOME | End: 2025-02-07
Attending: INTERNAL MEDICINE
Payer: COMMERCIAL

## 2025-02-07 PROCEDURE — 93668 PERIPHERAL VASCULAR REHAB: CPT | Performed by: REHABILITATION PRACTITIONER

## 2025-02-10 ENCOUNTER — ANCILLARY PROCEDURE (OUTPATIENT)
Dept: CT IMAGING | Facility: CLINIC | Age: 80
End: 2025-02-10
Attending: PHYSICIAN ASSISTANT
Payer: COMMERCIAL

## 2025-02-10 DIAGNOSIS — Z87.891 HISTORY OF TOBACCO USE, PRESENTING HAZARDS TO HEALTH: ICD-10-CM

## 2025-02-10 DIAGNOSIS — J43.9 PULMONARY EMPHYSEMA, UNSPECIFIED EMPHYSEMA TYPE (H): ICD-10-CM

## 2025-02-10 PROCEDURE — 71271 CT THORAX LUNG CANCER SCR C-: CPT

## 2025-02-10 NOTE — RESULT ENCOUNTER NOTE
Rudy Bui,     Stable lung nodules noted on your CT. Reassuring.     Let me know if you have any questions or concerns,     Tejinder Gonsalez PA-C  [facility]

## 2025-02-12 ENCOUNTER — HOSPITAL ENCOUNTER (OUTPATIENT)
Dept: CARDIAC REHAB | Facility: CLINIC | Age: 80
Discharge: HOME | End: 2025-02-12
Attending: INTERNAL MEDICINE
Payer: COMMERCIAL

## 2025-02-12 PROCEDURE — 93668 PERIPHERAL VASCULAR REHAB: CPT | Performed by: REHABILITATION PRACTITIONER

## 2025-02-14 ENCOUNTER — HOSPITAL ENCOUNTER (OUTPATIENT)
Dept: CARDIAC REHAB | Facility: CLINIC | Age: 80
Discharge: HOME | End: 2025-02-14
Attending: INTERNAL MEDICINE
Payer: COMMERCIAL

## 2025-02-14 PROCEDURE — 93668 PERIPHERAL VASCULAR REHAB: CPT | Performed by: REHABILITATION PRACTITIONER

## 2025-02-17 ENCOUNTER — HOSPITAL ENCOUNTER (OUTPATIENT)
Dept: CARDIAC REHAB | Facility: CLINIC | Age: 80
Discharge: HOME | End: 2025-02-17
Attending: INTERNAL MEDICINE
Payer: COMMERCIAL

## 2025-02-17 PROCEDURE — 93668 PERIPHERAL VASCULAR REHAB: CPT

## 2025-02-19 ENCOUNTER — HOSPITAL ENCOUNTER (OUTPATIENT)
Dept: CARDIAC REHAB | Facility: CLINIC | Age: 80
Discharge: HOME | End: 2025-02-19
Attending: INTERNAL MEDICINE
Payer: COMMERCIAL

## 2025-02-19 PROCEDURE — 93668 PERIPHERAL VASCULAR REHAB: CPT | Performed by: REHABILITATION PRACTITIONER

## 2025-02-21 ENCOUNTER — HOSPITAL ENCOUNTER (OUTPATIENT)
Dept: CARDIAC REHAB | Facility: CLINIC | Age: 80
Discharge: HOME | End: 2025-02-21
Attending: INTERNAL MEDICINE
Payer: COMMERCIAL

## 2025-02-21 PROCEDURE — 93668 PERIPHERAL VASCULAR REHAB: CPT | Performed by: OCCUPATIONAL THERAPIST

## 2025-02-24 ENCOUNTER — HOSPITAL ENCOUNTER (OUTPATIENT)
Dept: CARDIAC REHAB | Facility: CLINIC | Age: 80
Discharge: HOME | End: 2025-02-24
Attending: INTERNAL MEDICINE
Payer: COMMERCIAL

## 2025-02-24 PROCEDURE — 93668 PERIPHERAL VASCULAR REHAB: CPT | Performed by: REHABILITATION PRACTITIONER

## 2025-02-26 ENCOUNTER — HOSPITAL ENCOUNTER (OUTPATIENT)
Dept: CARDIAC REHAB | Facility: CLINIC | Age: 80
Discharge: HOME | End: 2025-02-26
Attending: INTERNAL MEDICINE
Payer: COMMERCIAL

## 2025-02-26 PROCEDURE — 93668 PERIPHERAL VASCULAR REHAB: CPT | Performed by: REHABILITATION PRACTITIONER

## 2025-02-28 ENCOUNTER — HOSPITAL ENCOUNTER (OUTPATIENT)
Dept: CARDIAC REHAB | Facility: CLINIC | Age: 80
Discharge: HOME | End: 2025-02-28
Attending: INTERNAL MEDICINE
Payer: COMMERCIAL

## 2025-02-28 PROCEDURE — 93668 PERIPHERAL VASCULAR REHAB: CPT | Performed by: CLINICAL EXERCISE PHYSIOLOGIST

## 2025-03-03 ENCOUNTER — HOSPITAL ENCOUNTER (OUTPATIENT)
Dept: CARDIAC REHAB | Facility: CLINIC | Age: 80
Discharge: HOME | End: 2025-03-03
Attending: INTERNAL MEDICINE
Payer: COMMERCIAL

## 2025-03-03 PROCEDURE — 93668 PERIPHERAL VASCULAR REHAB: CPT | Performed by: CLINICAL EXERCISE PHYSIOLOGIST

## 2025-03-07 ENCOUNTER — HOSPITAL ENCOUNTER (OUTPATIENT)
Dept: CARDIAC REHAB | Facility: CLINIC | Age: 80
Discharge: HOME | End: 2025-03-07
Attending: INTERNAL MEDICINE
Payer: COMMERCIAL

## 2025-03-07 PROCEDURE — 93668 PERIPHERAL VASCULAR REHAB: CPT | Performed by: OCCUPATIONAL THERAPIST

## 2025-03-10 ENCOUNTER — HOSPITAL ENCOUNTER (OUTPATIENT)
Dept: CARDIAC REHAB | Facility: CLINIC | Age: 80
Discharge: HOME | End: 2025-03-10
Attending: INTERNAL MEDICINE
Payer: COMMERCIAL

## 2025-03-10 PROCEDURE — 93668 PERIPHERAL VASCULAR REHAB: CPT | Performed by: REHABILITATION PRACTITIONER

## 2025-03-12 ENCOUNTER — HOSPITAL ENCOUNTER (OUTPATIENT)
Dept: CARDIAC REHAB | Facility: CLINIC | Age: 80
Discharge: HOME | End: 2025-03-12
Attending: INTERNAL MEDICINE
Payer: COMMERCIAL

## 2025-03-12 PROCEDURE — 93668 PERIPHERAL VASCULAR REHAB: CPT | Performed by: REHABILITATION PRACTITIONER

## 2025-03-14 ENCOUNTER — HOSPITAL ENCOUNTER (OUTPATIENT)
Dept: CARDIAC REHAB | Facility: CLINIC | Age: 80
Discharge: HOME | End: 2025-03-14
Attending: INTERNAL MEDICINE
Payer: COMMERCIAL

## 2025-03-14 PROCEDURE — 93668 PERIPHERAL VASCULAR REHAB: CPT | Performed by: REHABILITATION PRACTITIONER

## 2025-03-17 ENCOUNTER — HOSPITAL ENCOUNTER (OUTPATIENT)
Dept: CARDIAC REHAB | Facility: CLINIC | Age: 80
Discharge: HOME | End: 2025-03-17
Attending: INTERNAL MEDICINE
Payer: COMMERCIAL

## 2025-03-17 PROCEDURE — 93668 PERIPHERAL VASCULAR REHAB: CPT

## 2025-03-26 ENCOUNTER — HOSPITAL ENCOUNTER (OUTPATIENT)
Dept: CARDIAC REHAB | Facility: CLINIC | Age: 80
Discharge: HOME | End: 2025-03-26
Attending: INTERNAL MEDICINE
Payer: COMMERCIAL

## 2025-03-26 PROCEDURE — 93668 PERIPHERAL VASCULAR REHAB: CPT | Performed by: REHABILITATION PRACTITIONER

## 2025-03-28 ENCOUNTER — HOSPITAL ENCOUNTER (OUTPATIENT)
Dept: CARDIAC REHAB | Facility: CLINIC | Age: 80
Discharge: HOME | End: 2025-03-28
Attending: INTERNAL MEDICINE
Payer: COMMERCIAL

## 2025-03-28 PROCEDURE — 93668 PERIPHERAL VASCULAR REHAB: CPT | Performed by: CLINICAL EXERCISE PHYSIOLOGIST

## 2025-03-31 ENCOUNTER — HOSPITAL ENCOUNTER (OUTPATIENT)
Dept: CARDIAC REHAB | Facility: CLINIC | Age: 80
Discharge: HOME | End: 2025-03-31
Attending: INTERNAL MEDICINE
Payer: COMMERCIAL

## 2025-03-31 PROCEDURE — 93798 PHYS/QHP OP CAR RHAB W/ECG: CPT | Performed by: REHABILITATION PRACTITIONER

## 2025-04-02 ENCOUNTER — HOSPITAL ENCOUNTER (OUTPATIENT)
Dept: CARDIAC REHAB | Facility: CLINIC | Age: 80
Discharge: HOME | End: 2025-04-02
Attending: INTERNAL MEDICINE
Payer: COMMERCIAL

## 2025-04-02 PROCEDURE — 93668 PERIPHERAL VASCULAR REHAB: CPT

## 2025-04-14 DIAGNOSIS — S22.088A OTHER FRACTURE OF T11-T12 VERTEBRA, INITIAL ENCOUNTER FOR CLOSED FRACTURE (H): ICD-10-CM

## 2025-04-14 DIAGNOSIS — M81.0 OSTEOPOROSIS, UNSPECIFIED OSTEOPOROSIS TYPE, UNSPECIFIED PATHOLOGICAL FRACTURE PRESENCE: ICD-10-CM

## 2025-04-14 RX ORDER — ALENDRONATE SODIUM 70 MG/1
70 TABLET ORAL
Qty: 12 TABLET | Refills: 2 | Status: SHIPPED | OUTPATIENT
Start: 2025-04-14

## 2025-04-20 ENCOUNTER — APPOINTMENT (OUTPATIENT)
Dept: CT IMAGING | Facility: CLINIC | Age: 80
End: 2025-04-20
Attending: PHYSICIAN ASSISTANT
Payer: COMMERCIAL

## 2025-04-20 ENCOUNTER — HOSPITAL ENCOUNTER (EMERGENCY)
Facility: CLINIC | Age: 80
Discharge: HOME | End: 2025-04-20
Attending: PHYSICIAN ASSISTANT | Admitting: PHYSICIAN ASSISTANT
Payer: COMMERCIAL

## 2025-04-20 VITALS
OXYGEN SATURATION: 98 % | HEIGHT: 70 IN | SYSTOLIC BLOOD PRESSURE: 181 MMHG | HEART RATE: 92 BPM | TEMPERATURE: 98.8 F | DIASTOLIC BLOOD PRESSURE: 91 MMHG | WEIGHT: 165 LBS | RESPIRATION RATE: 14 BRPM | BODY MASS INDEX: 23.62 KG/M2

## 2025-04-20 DIAGNOSIS — R55 SYNCOPE, UNSPECIFIED SYNCOPE TYPE: ICD-10-CM

## 2025-04-20 DIAGNOSIS — N17.9 ACUTE KIDNEY INJURY: ICD-10-CM

## 2025-04-20 LAB
ANION GAP SERPL CALCULATED.3IONS-SCNC: 11 MMOL/L (ref 7–15)
ATRIAL RATE - MUSE: 84 BPM
BASOPHILS # BLD AUTO: 0 10E3/UL (ref 0–0.2)
BASOPHILS NFR BLD AUTO: 0 %
BUN SERPL-MCNC: 22.9 MG/DL (ref 8–23)
CALCIUM SERPL-MCNC: 8.7 MG/DL (ref 8.8–10.4)
CHLORIDE SERPL-SCNC: 107 MMOL/L (ref 98–107)
CREAT SERPL-MCNC: 1.39 MG/DL (ref 0.51–0.95)
DIASTOLIC BLOOD PRESSURE - MUSE: NORMAL MMHG
EGFRCR SERPLBLD CKD-EPI 2021: 39 ML/MIN/1.73M2
EOSINOPHIL # BLD AUTO: 0.1 10E3/UL (ref 0–0.7)
EOSINOPHIL NFR BLD AUTO: 1 %
ERYTHROCYTE [DISTWIDTH] IN BLOOD BY AUTOMATED COUNT: 13.8 % (ref 10–15)
GLUCOSE SERPL-MCNC: 96 MG/DL (ref 70–99)
HCO3 SERPL-SCNC: 23 MMOL/L (ref 22–29)
HCT VFR BLD AUTO: 39.7 % (ref 35–47)
HGB BLD-MCNC: 12.9 G/DL (ref 11.7–15.7)
HOLD SPECIMEN: NORMAL
IMM GRANULOCYTES # BLD: 0 10E3/UL
IMM GRANULOCYTES NFR BLD: 0 %
INTERPRETATION ECG - MUSE: NORMAL
LYMPHOCYTES # BLD AUTO: 1.6 10E3/UL (ref 0.8–5.3)
LYMPHOCYTES NFR BLD AUTO: 17 %
MCH RBC QN AUTO: 30.5 PG (ref 26.5–33)
MCHC RBC AUTO-ENTMCNC: 32.5 G/DL (ref 31.5–36.5)
MCV RBC AUTO: 94 FL (ref 78–100)
MONOCYTES # BLD AUTO: 0.7 10E3/UL (ref 0–1.3)
MONOCYTES NFR BLD AUTO: 7 %
NEUTROPHILS # BLD AUTO: 7 10E3/UL (ref 1.6–8.3)
NEUTROPHILS NFR BLD AUTO: 74 %
NRBC # BLD AUTO: 0 10E3/UL
NRBC BLD AUTO-RTO: 0 /100
P AXIS - MUSE: 76 DEGREES
PLATELET # BLD AUTO: 257 10E3/UL (ref 150–450)
POTASSIUM SERPL-SCNC: 3.9 MMOL/L (ref 3.4–5.3)
PR INTERVAL - MUSE: 192 MS
QRS DURATION - MUSE: 78 MS
QT - MUSE: 366 MS
QTC - MUSE: 432 MS
R AXIS - MUSE: 76 DEGREES
RBC # BLD AUTO: 4.23 10E6/UL (ref 3.8–5.2)
SODIUM SERPL-SCNC: 141 MMOL/L (ref 135–145)
SYSTOLIC BLOOD PRESSURE - MUSE: NORMAL MMHG
T AXIS - MUSE: 77 DEGREES
TROPONIN T SERPL HS-MCNC: 11 NG/L
TROPONIN T SERPL HS-MCNC: 9 NG/L
VENTRICULAR RATE- MUSE: 84 BPM
WBC # BLD AUTO: 9.5 10E3/UL (ref 4–11)

## 2025-04-20 PROCEDURE — 82310 ASSAY OF CALCIUM: CPT | Performed by: PHYSICIAN ASSISTANT

## 2025-04-20 PROCEDURE — 99285 EMERGENCY DEPT VISIT HI MDM: CPT | Mod: 25

## 2025-04-20 PROCEDURE — 36415 COLL VENOUS BLD VENIPUNCTURE: CPT | Performed by: EMERGENCY MEDICINE

## 2025-04-20 PROCEDURE — 93005 ELECTROCARDIOGRAM TRACING: CPT

## 2025-04-20 PROCEDURE — 36415 COLL VENOUS BLD VENIPUNCTURE: CPT | Performed by: PHYSICIAN ASSISTANT

## 2025-04-20 PROCEDURE — 84484 ASSAY OF TROPONIN QUANT: CPT | Performed by: PHYSICIAN ASSISTANT

## 2025-04-20 PROCEDURE — 85004 AUTOMATED DIFF WBC COUNT: CPT | Performed by: PHYSICIAN ASSISTANT

## 2025-04-20 PROCEDURE — 70450 CT HEAD/BRAIN W/O DYE: CPT

## 2025-04-20 PROCEDURE — 72125 CT NECK SPINE W/O DYE: CPT

## 2025-04-20 PROCEDURE — 80048 BASIC METABOLIC PNL TOTAL CA: CPT | Performed by: PHYSICIAN ASSISTANT

## 2025-04-20 PROCEDURE — 96360 HYDRATION IV INFUSION INIT: CPT

## 2025-04-20 PROCEDURE — 258N000003 HC RX IP 258 OP 636: Performed by: PHYSICIAN ASSISTANT

## 2025-04-20 RX ADMIN — SODIUM CHLORIDE 500 ML: 0.9 INJECTION, SOLUTION INTRAVENOUS at 17:34

## 2025-04-20 ASSESSMENT — COLUMBIA-SUICIDE SEVERITY RATING SCALE - C-SSRS
1. IN THE PAST MONTH, HAVE YOU WISHED YOU WERE DEAD OR WISHED YOU COULD GO TO SLEEP AND NOT WAKE UP?: NO
6. HAVE YOU EVER DONE ANYTHING, STARTED TO DO ANYTHING, OR PREPARED TO DO ANYTHING TO END YOUR LIFE?: NO
2. HAVE YOU ACTUALLY HAD ANY THOUGHTS OF KILLING YOURSELF IN THE PAST MONTH?: NO

## 2025-04-20 ASSESSMENT — ACTIVITIES OF DAILY LIVING (ADL)
ADLS_ACUITY_SCORE: 41

## 2025-04-20 NOTE — ED NOTES
Bed: ED11  Expected date:   Expected time:   Means of arrival:   Comments:  Eva 525 80f syncope eta 10

## 2025-04-20 NOTE — ED TRIAGE NOTES
Patient had syncope at home while bending over to get a ham out of the oven for Easter dinner. Reports not getting in much fluids and food since was busy to get the party ready. Family reported 1-2 minutes of LOC. Orthostatic on scene with blood pressure fall to 60/30 when stood up.      Triage Assessment (Adult)       Row Name 04/20/25 2246          Triage Assessment    Airway WDL WDL        Respiratory WDL    Respiratory WDL WDL        Skin Circulation/Temperature WDL    Skin Circulation/Temperature WDL WDL        Cardiac WDL    Cardiac WDL X        Peripheral/Neurovascular WDL    Peripheral Neurovascular WDL X        Cognitive/Neuro/Behavioral WDL    Cognitive/Neuro/Behavioral WDL WDL

## 2025-04-20 NOTE — ED PROVIDER NOTES
Emergency Department Note      History of Present Illness     Chief Complaint   Loss of Consciousness      HPI   Ramya Hillman is a 80 year old female CAD and HTN who presents with syncopal episode. Patient reports she was prepping for Easter dinner today and lifting food out of the oven. She stood up suddenly and felt dizzy. She then had a syncopal episode witnessed by her sister. Her sister was able to catch her and help her to the ground. Reports neck pain but notes she has chronic neck pain and had pain before making dinner. EMS was called and noted SBP to be low in the 60s. Patient reports she only drank coffee today. Denies chest pain, shortness of breath, abdominal pain, speech difficulty or weakness. She takes losartan 50 mg at night for high blood pressure.    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)     Emphysema lung (H) 01/06/2022    Family history of heart disease     History of basal cell carcinoma 10/17/2017    Hyperlipidemia     Hypertension April 2021    PVD (peripheral vascular disease)     Tobacco abuse        Medications   albuterol (PROAIR HFA/PROVENTIL HFA/VENTOLIN HFA) 108 (90 Base) MCG/ACT inhaler  alendronate (FOSAMAX) 70 MG tablet  amoxicillin (AMOXIL) 500 MG tablet  aspirin 81 MG tablet  Cholecalciferol (VITAMIN D) 1000 UNITS capsule  clobetasol (TEMOVATE) 0.05 % external solution  Coenzyme Q10 (CO Q10) 100 MG CAPS  desonide (DESOWEN) 0.05 % external cream  fluticasone (FLONASE) 50 MCG/ACT nasal spray  losartan (COZAAR) 50 MG tablet  multivitamin w/minerals (THERA-VIT-M) tablet  Omega-3 Fatty Acids (FISH OIL) 1000 MG CPDR  omeprazole (PRILOSEC) 40 MG   "capsule  rosuvastatin (CRESTOR) 40 MG tablet        Surgical History   Past Surgical History:   Procedure Laterality Date    BIOPSY  3/1/2018    Mole on back    BUNIONECTOMY Right 8/26/2019    Procedure: BUNIONECTOMY RIGHT FOOT;  Surgeon: Rajni Sanchez MD;  Location: [facility] OR    COLONOSCOPY  10/18/2018    1 polyp - all clear    NO HISTORY OF SURGERY      ORTHOPEDIC SURGERY  1/1/2012    Rotator Cuff - Shoulder       Physical Exam     Patient Vitals for the past 24 hrs:   BP Temp Temp src Pulse Resp SpO2 Height Weight   04/20/25 1945 (!) 181/91 -- -- 92 14 -- -- --   04/20/25 1930 (!) 171/82 -- -- 93 10 98 % -- --   04/20/25 1854 (!) 166/93 -- -- 89 10 99 % -- --   04/20/25 1812 -- -- -- 92 17 99 % -- --   04/20/25 1749 (!) 165/79 -- -- 86 18 99 % -- --   04/20/25 1748 (!) 165/74 -- -- 89 10 99 % -- --   04/20/25 1733 (!) 155/78 -- -- 85 -- 99 % -- --   04/20/25 1718 (!) 153/76 -- -- 90 23 98 % -- --   04/20/25 1703 (!) 124/108 -- -- 81 11 99 % -- --   04/20/25 1648 (!) 140/71 -- -- 85 10 97 % -- --   04/20/25 1637 (!) 143/72 98.8  F (37.1  C) Oral 89 16 99 % 1.778 m (5' 10\") 74.8 kg (165 lb)     Physical Exam  Constitutional: Alert, attentive, GCS 15  HENT:    Nose: Nose normal.    Mouth/Throat: Oropharynx is clear, mucous membranes are moist   Eyes: EOM are normal. Pupils equal and reactive.  Neck: Normal range of motion. No midline tenderness. Right-sided paraspinal tenderness.   CV: regular rate and rhythm; no murmurs, rubs or gallups  Chest: Effort normal and breath sounds normal.   GI:  There is no tenderness. No distension. Normal bowel sounds  MSK: Normal range of motion.   Neurological: Alert, attentive, Oriented x 3. No facial asymmetry. CN II-XII intact. 5/5 strength in upper and lower extremities. Normal range of motion in all four extremities. Distal sensation in hands and feet intact. Normal gait.  Skin: Skin is warm and dry.      Diagnostics     Lab Results   Labs Ordered and Resulted from Time of " ED Arrival to Time of ED Departure   BASIC METABOLIC PANEL - Abnormal       Result Value    Sodium 141      Potassium 3.9      Chloride 107      Carbon Dioxide (CO2) 23      Anion Gap 11      Urea Nitrogen 22.9      Creatinine 1.39 (*)     GFR Estimate 39 (*)     Calcium 8.7 (*)     Glucose 96     TROPONIN T, HIGH SENSITIVITY - Normal    Troponin T, High Sensitivity 11     TROPONIN T, HIGH SENSITIVITY - Normal    Troponin T, High Sensitivity 9     CBC WITH PLATELETS AND DIFFERENTIAL    WBC Count 9.5      RBC Count 4.23      Hemoglobin 12.9      Hematocrit 39.7      MCV 94      MCH 30.5      MCHC 32.5      RDW 13.8      Platelet Count 257      % Neutrophils 74      % Lymphocytes 17      % Monocytes 7      % Eosinophils 1      % Basophils 0      % Immature Granulocytes 0      NRBCs per 100 WBC 0      Absolute Neutrophils 7.0      Absolute Lymphocytes 1.6      Absolute Monocytes 0.7      Absolute Eosinophils 0.1      Absolute Basophils 0.0      Absolute Immature Granulocytes 0.0      Absolute NRBCs 0.0         Imaging   CT Cervical Spine w/o Contrast   Final Result   IMPRESSION:   1.  No evidence of acute fracture or subluxation of the cervical spine by CT imaging.   2.  Degenerative cervical spondylosis as described above.      Head CT w/o contrast   Final Result   IMPRESSION:     1.  No evidence of acute intracranial hemorrhage or mass effect.   2.  Mild nonspecific white matter changes.   3.  Mild brain parenchymal volume loss.          EKG   ECG results from 04/20/25   EKG 12 lead     Value    Systolic Blood Pressure     Diastolic Blood Pressure     Ventricular Rate 84    Atrial Rate 84    [place] Interval 192    QRS Duration 78        QTc 432    P [place] 76    R AXIS 76    T Axis 77    Interpretation ECG      Sinus rhythm  Normal ECG  When compared with ECG of 26-Jul-2006 11:20,  No significant change was found  Confirmed by GENERATED REPORT, COMPUTER ([id number]),  KI LEGER ([id number]) on 4/20/2025 5:51:42 PM          Independent Interpretation   None    ED Course      Medications Administered   Medications   sodium chloride 0.9% BOLUS 500 mL (0 mLs Intravenous Stopped 4/20/25 7052)       Procedures   Procedures     Discussion of Management   None    ED Course        Additional Documentation  None    Medical Decision Making / Diagnosis     CMS Diagnoses: None    MIPS       None    [facility]   Ramya Hillman is a 80 year old female with HTN and CAD who presents for evaluation of syncopal episode that occurred at home today.  She is afebrile, hypertensive at 166/93, nonhypoxic.  Physical examination reveals no focal neurological deficits.  Patient reports that she felt dizzy upon standing up quickly before syncopal event and EMS notes that her blood pressure was low with systolics in the 60s. No chest pain or shortness of breath.  CT of the head reveals no intracranial hemorrhage from fall.  Cervical CT also negative for traumatic injury.  Labs today reveal very mild acute kidney injury with creatinine at 1.39.  Baseline appears to be around 1.  She was provided with a 500 mL bolus of fluids. She was mildly orthostatic initially upon standing with a 20 point systolic drop however patient able to ambulate around department without recurrent syncope or dizziness after IV fluid bolus.  EKGs and delta troponin show no evidence of ACS.  PE is also considered however this is considered unlikely and she has no tachypnea, hypoxia, signs of DVT, hemoptysis, or prior DVT/PE. Symptoms consistent with transient orthostatic hypotension that has since resolved.  Recommend close follow-up with primary care to discuss syncopal episode and recheck creatinine and blood pressure medications. No indication for hospitalization at this time. Results reviewed with patient at bedside who wants to go home and feels back to at baseline.  Return precautions discussed including recurrent syncopal event, chest pain.  Patient comfortable with plan and she is  discharged home.    Disposition   The patient was discharged.     Diagnosis     ICD-10-CM    1. Syncope, unspecified syncope type  R55       2. Acute kidney injury  N17.9            Discharge Medications   Discharge Medication List as of 4/20/2025  7:46 PM            4:43 PM  April 20, 2025  LYNNE MCCOY Emily, PA-C  04/20/25 1013

## 2025-04-21 NOTE — DISCHARGE INSTRUCTIONS
Your labs and imaging are reassuring today. Be sure to eat and drink fluids. Continue home medications and schedule recheck with primary care. Record blood pressures and bring them into primary care visit. Return to ED with any new or worsening symptoms including chest pain, shortness of breath, or recurrent fainting.     Discharge Instructions  Syncope    Syncope (fainting) is a sudden, short loss of consciousness (passing out spell). People will usually fall to the ground when they faint or slump over if seated.  People may also shake when this happens, and it can sometimes be difficult to tell the difference between syncope and a seizure. At this time, your provider does not find a reason to suspect that your fainting spell is a sign of anything dangerous or life-threatening.  However, sometimes the signs of serious illness do not show up right away.     Generally, every Emergency Department visit should have a follow-up clinic visit with either a primary or a specialty clinic/provider. Please follow-up as instructed by your emergency provider today.    Return to the Emergency Department if:  You faint again.   You have any significant bleeding.  You have chest pain or a fast or irregular heartbeat.  You feel short of breath.  You cough up any blood.  You have abdominal (belly) pain or unusual back pain.  You have ongoing vomiting (throwing up) or diarrhea (loose stools).  You have a black or tarry bowel movement, or blood in the stool or in your vomit.  You have a fever over 101 F.  You lose feeling or cannot move a part of your body or cannot talk normally.  You are confused, have a headache, cannot see well, or have a seizure.  DO NOT DRIVE. CALL 911 INSTEAD!    What can I do to help myself?  Follow any specific instructions that your provider discussed with you.  If you feel light-headed, make sure to sit down right away, even if you have to sit on the floor.  Follow up with your regular medical provider as  discussed for further management. This may include lowering your blood pressure medications, insulin or other diabetic medications, checking your blood sugar more frequently, and drinking more fluids, taking medicines for vomiting or diarrhea or getting up slower.  If you were given a prescription for medicine here today, be sure to read all of the information (including the package insert) that comes with your prescription.  This will include important information about the medicine, its side effects, and any warnings that you need to know about.  The pharmacist who fills the prescription can provide more information and answer questions you may have about the medicine.  If you have questions or concerns that the pharmacist cannot address, please call or return to the Emergency Department.   Remember that you can always come back to the Emergency Department if you are not able to see your regular provider in the amount of time listed above, if you get any new symptoms, or if there is anything that worries you.

## 2025-04-29 ENCOUNTER — OFFICE VISIT (OUTPATIENT)
Dept: FAMILY MEDICINE | Facility: CLINIC | Age: 80
End: 2025-04-29
Payer: COMMERCIAL

## 2025-04-29 VITALS
DIASTOLIC BLOOD PRESSURE: 67 MMHG | WEIGHT: 171 LBS | OXYGEN SATURATION: 100 % | TEMPERATURE: 96.8 F | HEIGHT: 70 IN | HEART RATE: 88 BPM | SYSTOLIC BLOOD PRESSURE: 110 MMHG | BODY MASS INDEX: 24.48 KG/M2 | RESPIRATION RATE: 16 BRPM

## 2025-04-29 DIAGNOSIS — N17.9 AKI (ACUTE KIDNEY INJURY): ICD-10-CM

## 2025-04-29 DIAGNOSIS — R55 SYNCOPE, UNSPECIFIED SYNCOPE TYPE: ICD-10-CM

## 2025-04-29 DIAGNOSIS — I10 ESSENTIAL HYPERTENSION: Primary | ICD-10-CM

## 2025-04-29 LAB
ANION GAP SERPL CALCULATED.3IONS-SCNC: 9 MMOL/L (ref 7–15)
BUN SERPL-MCNC: 24.5 MG/DL (ref 8–23)
CALCIUM SERPL-MCNC: 9.1 MG/DL (ref 8.8–10.4)
CHLORIDE SERPL-SCNC: 106 MMOL/L (ref 98–107)
CREAT SERPL-MCNC: 0.96 MG/DL (ref 0.51–0.95)
EGFRCR SERPLBLD CKD-EPI 2021: 60 ML/MIN/1.73M2
GLUCOSE SERPL-MCNC: 88 MG/DL (ref 70–99)
HCO3 SERPL-SCNC: 24 MMOL/L (ref 22–29)
POTASSIUM SERPL-SCNC: 4.8 MMOL/L (ref 3.4–5.3)
SODIUM SERPL-SCNC: 139 MMOL/L (ref 135–145)

## 2025-04-29 PROCEDURE — 36415 COLL VENOUS BLD VENIPUNCTURE: CPT | Performed by: PHYSICIAN ASSISTANT

## 2025-04-29 PROCEDURE — 1126F AMNT PAIN NOTED NONE PRSNT: CPT | Performed by: PHYSICIAN ASSISTANT

## 2025-04-29 PROCEDURE — 80048 BASIC METABOLIC PNL TOTAL CA: CPT | Performed by: PHYSICIAN ASSISTANT

## 2025-04-29 PROCEDURE — G2211 COMPLEX E/M VISIT ADD ON: HCPCS | Performed by: PHYSICIAN ASSISTANT

## 2025-04-29 PROCEDURE — 93242 EXT ECG>48HR<7D RECORDING: CPT | Performed by: PHYSICIAN ASSISTANT

## 2025-04-29 PROCEDURE — 3078F DIAST BP <80 MM HG: CPT | Performed by: PHYSICIAN ASSISTANT

## 2025-04-29 PROCEDURE — 3074F SYST BP LT 130 MM HG: CPT | Performed by: PHYSICIAN ASSISTANT

## 2025-04-29 PROCEDURE — 99214 OFFICE O/P EST MOD 30 MIN: CPT | Performed by: PHYSICIAN ASSISTANT

## 2025-04-29 RX ORDER — LOSARTAN POTASSIUM 25 MG/1
25 TABLET ORAL DAILY
Qty: 90 TABLET | Refills: 1 | Status: SHIPPED | OUTPATIENT
Start: 2025-04-29

## 2025-04-29 ASSESSMENT — PAIN SCALES - GENERAL: PAINLEVEL_OUTOF10: NO PAIN (0)

## 2025-04-29 NOTE — PROGRESS NOTES
Assessment & Plan     Essential hypertension  KENZIE (acute kidney injury)  Syncope, unspecified syncope type    Decrease losartan 25 mg daily.  At home blood pressure monitoring and bring in a list of blood pressure readings to discuss with Dr. Wolf at upcoming appointment.  Needs to stay well-hydrated.  Does not drink enough water.  Reviewed 6-8 day glasses per day is reasonable for her.  Repeat BMP today to ensure improvement of kidney function.  Her request for Zio patch I think is reasonable given syncopal episode however believe secondary to dehydration/antihypertensives.  Close follow-up with new or worsening symptoms.    - ZIO PATCH 3-7 DAYS (additional cost to patient)  - ZIO PATCH 3-7 DAYS APPLICATION  - Basic metabolic panel  (Ca, Cl, CO2, Creat, Gluc, K, Na, BUN)  - losartan (COZAAR) 25 MG tablet  Dispense: 90 tablet; Refill: 1      MED REC REQUIRED  Post Medication Reconciliation Status: discharge medications reconciled, continue medications without change      30 minutes spent by me on the date of the encounter doing chart review, review of test results, interpretation of tests, patient visit, and documentation     The longitudinal plan of care for the diagnosis(es)/condition(s) as documented were addressed during this visit. Due to the added complexity in care, I will continue to support Ramya Crews in the subsequent management and with ongoing continuity of care.    Subjective   Ramya Crews is a very pleasant 80 year old, presenting for the following health issues:  ER F/U    Here today for emergency department follow-up.  Seen on 4/20/2025 for a syncopal episode.  CT head as well as CT neck without acute findings.  Noted degenerative changes in the C5-C6 region.  Recovering well following this episode.  Orthostatic BP secondary to dehydration suspected as well as potentially her antihypertensives.  Blood pressures at home have been all under <130/80 however will get significant low readings including most  "recently 68/55, 96/65 to name a few.  On losartan 50 mg.      Upcoming vascular follow-up.    She experiences dizziness off and on however nothing to the extreme as what brought her to the ER on 4/20.  Lexiscan completed in 2023 unremarkable.  Carotid ultrasound with mild plaque formation.  Expresses interest in pursuing a Zio patch given recent syncopal episode.  States her sister had a similar workup and completed a heart monitor which revealed the need for her to have a pacemaker.  She denies heart racing/palpitations.          4/29/2025    11:01 AM   Additional Questions   Roomed by Patti     History of Present Illness       Reason for visit:  Follow-up for recent ER visit blackout  Symptom onset:  More than a month  Symptoms include:  Light headed dizzy spells-very low blood pressure, passed out 4/20  Symptom intensity:  Moderate  Symptom progression:  Staying the same  Had these symptoms before:  Yes  Has tried/received treatment for these symptoms:  No  What makes it worse:  Generally tired  What makes it better:  No   She is taking medications regularly.        ED/UC Followup:    Facility:  [facility] Emergency Dept   Date of visit: 04/20/2025  Reason for visit: Syncope, unspecified syncope type & Acute kidney injury  Current Status: Improved    Review of Systems  Constitutional, neuro, ENT, endocrine, pulmonary, cardiac, gastrointestinal, genitourinary, musculoskeletal, integument and psychiatric systems are negative, except as otherwise noted.      Objective      Sitting at 11:13 AM  145/81  Standing at 11:16 /67  /67 (BP Location: Left arm, Patient Position: Standing, Cuff Size: Adult Regular)   Pulse 88   Temp 96.8  F (36  C) (Temporal)   Resp 16   Ht 1.778 m (5' 10\")   Wt 77.6 kg (171 lb)   LMP  (LMP Unknown)   SpO2 100%   BMI 24.54 kg/m    Body mass index is 24.54 kg/m .  Physical Exam   GENERAL: alert and no distress  EYES: Eyes grossly normal to inspection, PERRL " and conjunctivae and sclerae normal  NECK: no adenopathy, no asymmetry, masses, or scars  RESP: lungs clear to auscultation - no rales, rhonchi or wheezes  CV: regular rate and rhythm, normal S1 S2, no S3 or S4, no murmur, click or rub, no peripheral edema  MS: no gross musculoskeletal defects noted, no edema  SKIN: no suspicious lesions or rashes  NEURO: Normal strength and tone, mentation intact and speech normal  PSYCH: mentation appears normal, affect normal/bright    The likelihood of other entities in the differential is insufficient to justify any further testing for them at this time. This was explained to the patient. The patient was advised that persistent or worsening symptoms would require further evaluation. Patient advised to call the office and if unable to reach to go to the emergency room if they develop any new or worsening symptoms. Expressed understanding and agreement with above stated plan.     Signed Electronically by: Tejinder Gonsalez PA-C

## 2025-04-29 NOTE — PATIENT INSTRUCTIONS
-Decrease Losartan to 25 mg daily. At home BP monitoring. Bring to Dr. Wolf at upcoming appt.  -Labs  -Zio patch  -Stay hydrated!

## 2025-04-29 NOTE — NURSING NOTE
Ramya Hillman arrived here on 4/29/2025 12:02 PM for 3-7 Days  Zio monitor placement per ordering provider  Tejinder Gonsalez PA-C    for the diagnosis   Essential hypertension [I10]  - Primary      Syncope, unspecified syncope type [R55]      .  Patient s skin was prepped per protocol. Dr. Tejinder Gonsalez PA-C    is the supervising provider.  Zio monitor was placed.  Instructions were reviewed with and given to the patient.  Patient verbalized understanding of wear, troubleshooting and monitor return instructions.    Tamiko placed and had me observe as teaching.  Patti KONG MA

## 2025-04-30 NOTE — RESULT ENCOUNTER NOTE
Rudy Crews,     Great seeing you yesterday!  Significant improvement in your kidney function.  Back to baseline.  Continue to stay well-hydrated.    Let me know if you have any questions or concerns,     Tejinder Gonsalez PA-C  [facility]

## 2025-05-05 ENCOUNTER — HOSPITAL ENCOUNTER (OUTPATIENT)
Dept: ULTRASOUND IMAGING | Facility: CLINIC | Age: 80
Discharge: HOME | End: 2025-05-05
Attending: INTERNAL MEDICINE | Admitting: INTERNAL MEDICINE
Payer: COMMERCIAL

## 2025-05-05 DIAGNOSIS — I73.9 CLAUDICATION: ICD-10-CM

## 2025-05-05 PROCEDURE — 93924 LWR XTR VASC STDY BILAT: CPT

## 2025-05-12 LAB — CV ZIO PRELIM RESULTS: NORMAL

## 2025-05-13 ENCOUNTER — OFFICE VISIT (OUTPATIENT)
Dept: OTHER | Facility: CLINIC | Age: 80
End: 2025-05-13
Attending: INTERNAL MEDICINE
Payer: COMMERCIAL

## 2025-05-13 VITALS
HEART RATE: 86 BPM | DIASTOLIC BLOOD PRESSURE: 92 MMHG | BODY MASS INDEX: 24.19 KG/M2 | WEIGHT: 168.6 LBS | SYSTOLIC BLOOD PRESSURE: 160 MMHG | OXYGEN SATURATION: 100 %

## 2025-05-13 DIAGNOSIS — I10 ESSENTIAL HYPERTENSION: ICD-10-CM

## 2025-05-13 DIAGNOSIS — E78.5 HYPERLIPIDEMIA LDL GOAL <70: ICD-10-CM

## 2025-05-13 DIAGNOSIS — M79.89 OTHER SPECIFIED SOFT TISSUE DISORDERS: ICD-10-CM

## 2025-05-13 DIAGNOSIS — I73.9 CLAUDICATION: ICD-10-CM

## 2025-05-13 DIAGNOSIS — R73.01 IFG (IMPAIRED FASTING GLUCOSE): ICD-10-CM

## 2025-05-13 DIAGNOSIS — I73.9 PAD (PERIPHERAL ARTERY DISEASE): Primary | ICD-10-CM

## 2025-05-13 DIAGNOSIS — K21.00 GASTROESOPHAGEAL REFLUX DISEASE WITH ESOPHAGITIS WITHOUT HEMORRHAGE: ICD-10-CM

## 2025-05-13 PROCEDURE — G2211 COMPLEX E/M VISIT ADD ON: HCPCS | Performed by: INTERNAL MEDICINE

## 2025-05-13 PROCEDURE — 3080F DIAST BP >= 90 MM HG: CPT | Performed by: INTERNAL MEDICINE

## 2025-05-13 PROCEDURE — G0463 HOSPITAL OUTPT CLINIC VISIT: HCPCS | Performed by: INTERNAL MEDICINE

## 2025-05-13 PROCEDURE — 3077F SYST BP >= 140 MM HG: CPT | Performed by: INTERNAL MEDICINE

## 2025-05-13 PROCEDURE — 99215 OFFICE O/P EST HI 40 MIN: CPT | Performed by: INTERNAL MEDICINE

## 2025-05-13 NOTE — PROGRESS NOTES
VASCULAR MEDICINE FOLLOW UP VISIT          (I73.9) Claudication (H)     Comment: Not lifestyle limiting. Advised continued walking. Do not clip toenails agressively. She does not want to be on more meds and as such Pletal and Xarelto were not offered to her. Stress ABIs of 5/5/25 reveal resting and immediate postexercise ABIs are 0.67 and 0.28 on the right, and 1.01 and 0.62 on the left. She is now evolving ischemic rest pain.    I discussed PAD rehab with the patient. She elected to undertake this.      Plan:Obtain CTA abd/pelvis with runoff and vein mapping , RTC one week later. Consider EV versus open revascularization due to worsening ABIs and rest pain based upon results.             (E78.5) Hyperlipidemia LDL goal <70  Comment: At LDL goal  on a statin  Plan: Check lipid panel in 12/2025. Consider use of pelacarsen for Lp(a) elevation when it becomes avialable on the market            (R73.01) IFG (impaired fasting glucose)  Comment: avoid CHO, at goal  Plan: Hemoglobin A1c in in 12/2025            (I10) Essential hypertension  Comment: At home, SBP averaged /. Here on 12/13/23, BP was 171/139 so we added metoprolol 12.5 mg PO BID to losartan 50 mg daily. She then got low readings of 90/60 and was dizzy so we stopped metoprolol and continued losartan 50 mg daily. She then still had low BP and was symptomatic with syncope. Losartan was decreased to 25 mg daily again and she is now running 130/80. Here she is 160/92.  Plan: Continue losartan 25 mg daily under a permissive htn strategy. RTC 12/2025 for a BP check, bring home BP log book in. RTC to discuss other BP med options , which could include amlodipine, Imdur, or hydralazine.         The longitudinal care of plan for Ramya was addressed during this visit. Due to added complexity of care, we will continue to support Ramya Hillman  and the subsequent management of these conditions and with ongoing continuity of care for these conditions.          41 minutes total medical care on today's date.                    HPI: Ramya Hillman is a 79 year old female who was seen today regarding bilateral lower leg claudication.  She is a former smoker and who quit smoking all together in 8/2019 after her appointment with Dr. Salinas, who has since moved to [place]. When seen on 9/17/2020, the patient could walk about 4 blocks/15 minutes before she would get cramping calf pain that resolved with short rest. That distance was very repeatable. She had improved since 2021 when she could only walk for 2 blocks and four minutes.  She denied chest pain and no stroke or TIA symptoms. Currently, she does not feel her lifestyle is severely limited by her symptoms, and she is now walking regularly. She has no CLI sxs or tissue loss.      On a statin, her LDL <70, LDL-P<1000, Lp(a) moderately elevated at 45. BP today is 118/69. Her BP at home runs 120/70. Her resting ZOILA was  originally  0.56 (post exercise 0.62, previously 0.25)on the right, previously 0.58 with monophasic waveforms;Her left ZOILA is 0.98, previously 0.90 (post exercise 0.80, previously 0.72), with triphasic waveforms. She can currently walk 6 blocks before she develops pain in the RLE. She will stop to rest for 1-2 minutes and her sxs resolve to the point that she can walk again without limitation.This does not appreciably impact her ADLs. She has no nocturnal ischemic rest pain or nonhealing ulcers.     When seen in 4/2022, her BP was elevated. Med changes were not made at that time. She was advised to RTC with a month's worth of BP readings from home. In 05/22, it was noted her SBP averaged 119 at home. Now her SBP averages 120.            Review Of Systems  Skin: negative  Eyes: negative  Ears/Nose/Throat: negative  Respiratory: No shortness of breath, dyspnea on exertion, cough, or hemoptysis  Cardiovascular: negative  Gastrointestinal: negative  Genitourinary: negative  Musculoskeletal: negative  Neurologic:  negative  Psychiatric: negative  Hematologic/Lymphatic/Immunologic: negative  Endocrine: negative          PAST MEDICAL HISTORY:                  Past Medical History:   Diagnosis Date    Arthritis 01/01/2000    Hands & feet    Chest pain     Chronic ischemic heart disease     Condyloma     had for5 years, 20 years ago,recurrence 2012 biopsy taken    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)     Emphysema lung (H) 01/06/2022    Family history of heart disease     History of basal cell carcinoma 10/17/2017    Hyperlipidemia     Hypertension April 2021    PVD (peripheral vascular disease)     Tobacco abuse        PAST SURGICAL HISTORY:                  Past Surgical History:   Procedure Laterality Date    BIOPSY  3/1/2018    Mole on back    BUNIONECTOMY Right 8/26/2019    Procedure: BUNIONECTOMY RIGHT FOOT;  Surgeon: Rajni Sanchez MD;  Location: [facility] OR    COLONOSCOPY  10/18/2018    1 polyp - all clear    NO HISTORY OF SURGERY      ORTHOPEDIC SURGERY  1/1/2012    Rotator Cuff - Shoulder       CURRENT MEDICATIONS:                  Current Outpatient Medications   Medication Sig Dispense Refill    albuterol (PROAIR HFA/PROVENTIL HFA/VENTOLIN HFA) 108 (90 Base) MCG/ACT inhaler Inhale 2 puffs into the lungs every 6 hours as needed for shortness of breath 18 g 1    alendronate (FOSAMAX) 70 MG tablet Take 1 tablet (70 mg) by mouth every 7 days. 12 tablet 2    amoxicillin (AMOXIL) 500 MG tablet TAKE 1 TABLET BY MOUTH THREE TIMES DAILY X 7 DAYS START AFTER DENTAL SURGERY      aspirin 81 MG tablet Take by mouth daily 1 tablet     Cholecalciferol (VITAMIN D) 1000 UNITS capsule Take 1 capsule by mouth daily      clobetasol (TEMOVATE) 0.05 % external solution APPLY TOPICALLY TO THE SCALP 1 TO 2 TIMES A DAY FOR 1 TO 2 WEEKS AS NEEDED FOR FLARES (Patient not taking: Reported on 4/29/2025)      Coenzyme Q10 (CO Q10) 100 MG CAPS Take by mouth daily      desonide  (DESOWEN) 0.05 % external cream Apply topically to rash in buttock crease twice a day for a minimum of one week up to two weeks then as needed for flares.      fluticasone (FLONASE) 50 MCG/ACT nasal spray Spray 1 spray into both nostrils daily. 18.2 mL 1    losartan (COZAAR) 25 MG tablet Take 1 tablet (25 mg) by mouth daily. 90 tablet 1    losartan (COZAAR) 50 MG tablet Take 1 tablet (50 mg) by mouth daily. 90 tablet 3    multivitamin w/minerals (THERA-VIT-M) tablet Take 1 tablet by mouth daily      Omega-3 Fatty Acids (FISH OIL) 1000 MG CPDR Take by mouth daily      omeprazole (PRILOSEC) 40 MG DR capsule Take 1 capsule (40 mg) by mouth daily (Patient not taking: Reported on 2025) 90 capsule 1    rosuvastatin (CRESTOR) 40 MG tablet Take 1 tablet (40 mg) by mouth daily. 90 tablet 3       ALLERGIES:                  Allergies   Allergen Reactions    No Known Allergies        SOCIAL HISTORY:                  Social History     Socioeconomic History    Marital status:      Spouse name: Not on file    Number of children: 0    Years of education: Not on file    Highest education level: Not on file   Occupational History    Occupation: Sales   Tobacco Use    Smoking status: Former     Current packs/day: 0.00     Types: Cigarettes     Start date: 1990     Quit date: 2018     Years since quittin.7    Smokeless tobacco: Never    Tobacco comments:      2-3 cigs per day off and on   Vaping Use    Vaping status: Never Used   Substance and Sexual Activity    Alcohol use: Yes     Comment: Very minimal    Drug use: No    Sexual activity: Not Currently     Birth control/protection: Post-menopausal     Comment: postmenopausal   Other Topics Concern     Service Not Asked    Blood Transfusions Not Asked    Caffeine Concern No     Comment: 1 a day. mostly decaf    Occupational Exposure Not Asked    Hobby Hazards Not Asked    Sleep Concern Not Asked    Stress Concern Not Asked    Weight Concern Not Asked     Special Diet Not Asked    Back Care Not Asked    Exercise No     Comment: likes to walk    Bike Helmet Not Asked    Seat Belt Not Asked    Self-Exams Not Asked    Parent/sibling w/ CABG, MI or angioplasty before 65F 55M? Yes   Social History Narrative    Not on file     Social Drivers of Health     Financial Resource Strain: Low Risk  (1/26/2025)    Financial Resource Strain     Within the past 12 months, have you or your family members you live with been unable to get utilities (heat, electricity) when it was really needed?: No   Food Insecurity: Low Risk  (1/26/2025)    Food Insecurity     Within the past 12 months, did you worry that your food would run out before you got money to buy more?: No     Within the past 12 months, did the food you bought just not last and you didn t have money to get more?: No   Transportation Needs: Low Risk  (1/26/2025)    Transportation Needs     Within the past 12 months, has lack of transportation kept you from medical appointments, getting your medicines, non-medical meetings or appointments, work, or from getting things that you need?: No   Physical Activity: Insufficiently Active (1/26/2025)    Exercise Vital Sign     Days of Exercise per Week: 3 days     Minutes of Exercise per Session: 30 min   Stress: No Stress Concern Present (1/26/2025)    [employer] - Occupational Stress Questionnaire     Feeling of Stress : Not at all   Social Connections: Unknown (1/26/2025)    Social Connection and Isolation Panel [NHANES]     Frequency of Communication with Friends and Family: Not on file     Frequency of Social Gatherings with Friends and Family: Twice a week     Attends [employer] Services: Not on file     Active Member of Clubs or Organizations: Not on file     Attends Club or Organization Meetings: Not on file     Marital Status: Not on file   Interpersonal Safety: Low Risk  (1/28/2025)    Interpersonal Safety     Do you feel physically and  emotionally safe where you currently live?: Yes     Within the past 12 months, have you been hit, slapped, kicked or otherwise physically hurt by someone?: No     Within the past 12 months, have you been humiliated or emotionally abused in other ways by your partner or ex-partner?: No   Housing Stability: Low Risk  (2025)    Housing Stability     Do you have housing? : Yes     Are you worried about losing your housing?: No       FAMILY HISTORY:                   Family History   Problem Relation Age of Onset    Cerebrovascular Disease Mother     Coronary Artery Disease Mother             Osteoporosis Mother     Hyperlipidemia Mother     Coronary Artery Disease Father 81            Hyperlipidemia Father     Coronary Artery Disease Brother             Hyperlipidemia Brother     Coronary Artery Disease Sister 52            Hyperlipidemia Sister     Coronary Artery Disease Brother 62    Hyperlipidemia Brother     Coronary Artery Disease Sister     Hyperlipidemia Sister     Coronary Artery Disease Sister             Hyperlipidemia Sister     Breast Cancer Sister 80            Breast Cancer Niece     Coronary Artery Disease Sister     Breast Cancer Sister     Coronary Artery Disease Sister            Physical exam Reveals:    O/P: WNL  HEENT: WNL  NECK: No JVD, thyromegaly, or lymphadenopathy  HEART: RRR, no murmurs, gallops, or rubs  LUNGS: CTA bilaterally without rales, wheezes, or rhonchi  GI: NABS, nondistended, nontender, soft  EXT:without cyanosis, clubbing, or edema  NEURO: nonfocal  : no flank tenderness           All relevant labs and imaging reviewed by myself on today's date.

## 2025-05-13 NOTE — PROGRESS NOTES
[facility]      Patient is here for a  follow up    Pt is currently taking Aspirin and Statin.    BP (!) 160/92 (BP Location: Left arm, Patient Position: Sitting, Cuff Size: Adult Regular)   Pulse 86   Wt 168 lb 9.6 oz (76.5 kg)   LMP  (LMP Unknown)   SpO2 100%   BMI 24.19 kg/m      The provider has been notified that the patient has concerns of blood pressure and recent blackout event on [place].     Questions patient would like addressed today are: N/A.    Refills are needed: No    Has homecare services and agency name:  Elisa Dye MA

## 2025-05-19 LAB — CV ZIO PRELIM RESULTS: NORMAL

## (undated) DEVICE — IMM LIMB ELEVATOR DC40-0203

## (undated) DEVICE — SU VICRYL 1 CT-1 27" UND J261H

## (undated) DEVICE — GOWN XXLG REINFORCED 9071EL

## (undated) DEVICE — DRAPE COVER C-ARM SEAMLESS SNAP-KAP 03-KP26 LATEX FREE

## (undated) DEVICE — TOURNIQUET CUFF 30" STERILE

## (undated) DEVICE — CAST PADDING 4" STERILE 9044S

## (undated) DEVICE — GLOVE PROTEXIS W/NEU-THERA 8.0  2D73TE80

## (undated) DEVICE — PREP DURAPREP 26ML APL 8630

## (undated) DEVICE — ESU PENCIL W/HOLSTER E2350H

## (undated) DEVICE — KIT SURGICAL TURNOVER FVSD-01D

## (undated) DEVICE — SOL WATER IRRIG 1000ML BOTTLE 2F7114

## (undated) DEVICE — GLOVE PROTEXIS W/NEU-THERA 8.5  2D73TE85

## (undated) DEVICE — BNDG ELASTIC 4"X5YDS UNSTERILE 6611-40

## (undated) DEVICE — GLOVE PROTEXIS MICRO 8.5  2D73PM85

## (undated) DEVICE — SU ETHILON 3-0 FS-1 18" 669H

## (undated) DEVICE — PACK EXTREMITY SOP15EXFSD

## (undated) DEVICE — GLOVE PROTEXIS W/NEU-THERA 7.5  2D73TE75

## (undated) DEVICE — LINEN TOWEL PACK X5 5464

## (undated) DEVICE — SOL NACL 0.9% IRRIG 1000ML BOTTLE 2F7124

## (undated) RX ORDER — PROPOFOL 10 MG/ML
INJECTION, EMULSION INTRAVENOUS
Status: DISPENSED
Start: 2019-08-26

## (undated) RX ORDER — LIDOCAINE HYDROCHLORIDE 20 MG/ML
INJECTION, SOLUTION EPIDURAL; INFILTRATION; INTRACAUDAL; PERINEURAL
Status: DISPENSED
Start: 2019-08-26

## (undated) RX ORDER — OXYCODONE AND ACETAMINOPHEN 5; 325 MG/1; MG/1
TABLET ORAL
Status: DISPENSED
Start: 2019-08-26

## (undated) RX ORDER — FENTANYL CITRATE 50 UG/ML
INJECTION, SOLUTION INTRAMUSCULAR; INTRAVENOUS
Status: DISPENSED
Start: 2019-08-26

## (undated) RX ORDER — DEXAMETHASONE SODIUM PHOSPHATE 4 MG/ML
INJECTION, SOLUTION INTRA-ARTICULAR; INTRALESIONAL; INTRAMUSCULAR; INTRAVENOUS; SOFT TISSUE
Status: DISPENSED
Start: 2019-08-26

## (undated) RX ORDER — REGADENOSON 0.08 MG/ML
INJECTION, SOLUTION INTRAVENOUS
Status: DISPENSED
Start: 2023-07-10

## (undated) RX ORDER — ONDANSETRON 2 MG/ML
INJECTION INTRAMUSCULAR; INTRAVENOUS
Status: DISPENSED
Start: 2019-08-26

## (undated) RX ORDER — CEFAZOLIN SODIUM 1 G/50ML
SOLUTION INTRAVENOUS
Status: DISPENSED
Start: 2019-08-26

## (undated) RX ORDER — IBUPROFEN 600 MG/1
TABLET, FILM COATED ORAL
Status: DISPENSED
Start: 2019-08-26